# Patient Record
Sex: MALE | Race: BLACK OR AFRICAN AMERICAN | NOT HISPANIC OR LATINO | Employment: PART TIME | URBAN - METROPOLITAN AREA
[De-identification: names, ages, dates, MRNs, and addresses within clinical notes are randomized per-mention and may not be internally consistent; named-entity substitution may affect disease eponyms.]

---

## 2018-02-28 ENCOUNTER — HOSPITAL ENCOUNTER (EMERGENCY)
Facility: HOSPITAL | Age: 38
Discharge: LEFT BEFORE DISCHARGE | End: 2018-02-28
Attending: EMERGENCY MEDICINE
Payer: COMMERCIAL

## 2018-02-28 VITALS
HEART RATE: 68 BPM | SYSTOLIC BLOOD PRESSURE: 142 MMHG | OXYGEN SATURATION: 98 % | RESPIRATION RATE: 18 BRPM | DIASTOLIC BLOOD PRESSURE: 86 MMHG | TEMPERATURE: 98.2 F

## 2018-02-28 DIAGNOSIS — R73.9 HYPERGLYCEMIA: Primary | ICD-10-CM

## 2018-02-28 DIAGNOSIS — N17.9 ACUTE KIDNEY INJURY (HCC): ICD-10-CM

## 2018-02-28 LAB
ANION GAP SERPL CALCULATED.3IONS-SCNC: 11 MMOL/L (ref 4–13)
BACTERIA UR QL AUTO: ABNORMAL /HPF
BILIRUB UR QL STRIP: NEGATIVE
BUN SERPL-MCNC: 28 MG/DL (ref 5–25)
CALCIUM SERPL-MCNC: 9.7 MG/DL (ref 8.3–10.1)
CHLORIDE SERPL-SCNC: 83 MMOL/L (ref 100–108)
CLARITY UR: CLEAR
CO2 SERPL-SCNC: 28 MMOL/L (ref 21–32)
COLOR UR: YELLOW
CREAT SERPL-MCNC: 1.99 MG/DL (ref 0.6–1.3)
GFR SERPL CREATININE-BSD FRML MDRD: 48 ML/MIN/1.73SQ M
GLUCOSE SERPL-MCNC: 946 MG/DL (ref 65–140)
GLUCOSE SERPL-MCNC: >500 MG/DL (ref 65–140)
GLUCOSE SERPL-MCNC: >500 MG/DL (ref 65–140)
GLUCOSE UR STRIP-MCNC: ABNORMAL MG/DL
HGB UR QL STRIP.AUTO: ABNORMAL
KETONES UR STRIP-MCNC: NEGATIVE MG/DL
LEUKOCYTE ESTERASE UR QL STRIP: NEGATIVE
NITRITE UR QL STRIP: NEGATIVE
NON-SQ EPI CELLS URNS QL MICRO: ABNORMAL /HPF
PH UR STRIP.AUTO: 5.5 [PH] (ref 4.5–8)
POTASSIUM SERPL-SCNC: 4.7 MMOL/L (ref 3.5–5.3)
PROT UR STRIP-MCNC: NEGATIVE MG/DL
RBC #/AREA URNS AUTO: ABNORMAL /HPF
SODIUM SERPL-SCNC: 122 MMOL/L (ref 136–145)
SP GR UR STRIP.AUTO: 1.01 (ref 1–1.03)
UROBILINOGEN UR QL STRIP.AUTO: 0.2 E.U./DL
WBC #/AREA URNS AUTO: ABNORMAL /HPF

## 2018-02-28 PROCEDURE — 96360 HYDRATION IV INFUSION INIT: CPT

## 2018-02-28 PROCEDURE — 96361 HYDRATE IV INFUSION ADD-ON: CPT

## 2018-02-28 PROCEDURE — 36415 COLL VENOUS BLD VENIPUNCTURE: CPT | Performed by: EMERGENCY MEDICINE

## 2018-02-28 PROCEDURE — 81001 URINALYSIS AUTO W/SCOPE: CPT

## 2018-02-28 PROCEDURE — 80048 BASIC METABOLIC PNL TOTAL CA: CPT | Performed by: EMERGENCY MEDICINE

## 2018-02-28 PROCEDURE — 81002 URINALYSIS NONAUTO W/O SCOPE: CPT | Performed by: EMERGENCY MEDICINE

## 2018-02-28 PROCEDURE — 99283 EMERGENCY DEPT VISIT LOW MDM: CPT

## 2018-02-28 PROCEDURE — 82948 REAGENT STRIP/BLOOD GLUCOSE: CPT

## 2018-02-28 RX ADMIN — SODIUM CHLORIDE 1000 ML: 0.9 INJECTION, SOLUTION INTRAVENOUS at 10:39

## 2018-02-28 RX ADMIN — SODIUM CHLORIDE 1000 ML: 0.9 INJECTION, SOLUTION INTRAVENOUS at 11:52

## 2018-02-28 NOTE — ED NOTES
Pt states he cannot be admitted to the hospital today because he doesn't have anyone to stay with his kids, advised by Dr Elayne Bal that he will have to sign out [address], RN  02/28/18 3744

## 2018-02-28 NOTE — ED NOTES
Urine sample again requested from pt, states he cannot go at this time    Provided with cup of ice water "i'm thirsty as hell" advised to keep right arm straight so IV fluids can continue to infuse and notify staff when he is able to provide urine sample     Lety Ware RN  02/28/18 1111

## 2018-02-28 NOTE — ED NOTES
Pt stated "i should have gotten something to eat before coming in"  When asked when was last time pt had something to eat pt stated he ate a pickle this morning  Pt also stated that he needed to leave by 330pm because he needed to  his kids from school        Stone Little RN  02/28/18 1048

## 2018-02-28 NOTE — ED PROVIDER NOTES
History  Chief Complaint   Patient presents with    Urinary Frequency     pt reports urinary frequency and increased thirst since being released from shelter  pt also c/o numbness in hand  pt reports taking HCTZ daily  Having polyuria and polydipsia  No h/o DM  History provided by:  Patient   used: No    Urinary Frequency   Location:  Bladder  Onset quality:  Gradual  Duration:  3 days  Timing:  Constant  Progression:  Unchanged  Chronicity:  New  Associated symptoms: no abdominal pain, no cough, no diarrhea, no fever, no nausea, no rash, no rhinorrhea, no sore throat and no vomiting        None       History reviewed  No pertinent past medical history  History reviewed  No pertinent surgical history  History reviewed  No pertinent family history  I have reviewed and agree with the history as documented  Social History   Substance Use Topics    Smoking status: Current Some Day Smoker    Smokeless tobacco: Never Used    Alcohol use Yes      Comment: occasional        Review of Systems   Constitutional: Negative for chills and fever  HENT: Negative for rhinorrhea and sore throat  Respiratory: Negative for cough  Gastrointestinal: Negative for abdominal distention, abdominal pain, anal bleeding, blood in stool, constipation, diarrhea, nausea, rectal pain and vomiting  Endocrine: Positive for polydipsia and polyuria  Genitourinary: Negative for dysuria, flank pain, frequency, hematuria and urgency  Musculoskeletal: Negative for back pain  Skin: Negative for pallor and rash  All other systems reviewed and are negative        Physical Exam  ED Triage Vitals [02/28/18 1022]   Temperature Pulse Respirations Blood Pressure SpO2   97 6 °F (36 4 °C) 96 16 143/70 98 %      Temp Source Heart Rate Source Patient Position - Orthostatic VS BP Location FiO2 (%)   Temporal Monitor Sitting Right arm --      Pain Score       No Pain           Orthostatic Vital Signs  Vitals:    02/28/18 1022 02/28/18 1111 02/28/18 1336   BP: 143/70 116/57 142/86   Pulse: 96 92 68   Patient Position - Orthostatic VS: Sitting Lying Lying       Physical Exam   Constitutional: He is oriented to person, place, and time  He appears well-developed and well-nourished  No distress  HENT:   Head: Normocephalic  Mouth/Throat: Oropharynx is clear and moist and mucous membranes are normal    Neck: Normal range of motion  Neck supple  Cardiovascular: Normal rate, regular rhythm, normal heart sounds and intact distal pulses  Exam reveals no gallop and no friction rub  No murmur heard  Pulmonary/Chest: Effort normal and breath sounds normal  No respiratory distress  He has no wheezes  He has no rales  Abdominal: Soft  Normal appearance and bowel sounds are normal  He exhibits no distension and no mass  There is no hepatosplenomegaly  There is no tenderness  There is no rigidity, no rebound, no guarding, no CVA tenderness, no tenderness at McBurney's point and negative Johnson's sign  Lymphadenopathy:     He has no cervical adenopathy  Neurological: He is alert and oriented to person, place, and time  Skin: Skin is warm, dry and intact  No rash noted  No pallor  Nursing note and vitals reviewed        ED Medications  Medications   sodium chloride 0 9 % bolus 1,000 mL (0 mL Intravenous Stopped 2/28/18 1152)   sodium chloride 0 9 % bolus 1,000 mL (0 mL Intravenous Stopped 2/28/18 1303)       Diagnostic Studies  Results Reviewed     Procedure Component Value Units Date/Time    Fingerstick Glucose (POCT) [[id number]]  (Abnormal) Collected:  02/28/18 1305    Lab Status:  Final result Updated:  02/28/18 1306     POC Glucose >500 (HH) mg/dl     Urine Microscopic [[id number]]  (Abnormal) Collected:  02/28/18 1125    Lab Status:  Final result Specimen:  Urine from Urine, Clean Catch Updated:  02/28/18 1149     RBC, UA 0-1 (A) /hpf      WBC, UA None Seen /hpf      Epithelial Cells Occasional /hpf Bacteria, UA None Seen /hpf     Basic metabolic panel [[id number]]  (Abnormal) Collected:  02/28/18 1038    Lab Status:  Final result Specimen:  Blood from Arm, Right Updated:  02/28/18 1137     Sodium 122 (L) mmol/L      Potassium 4 7 mmol/L      Chloride 83 (L) mmol/L      CO2 28 mmol/L      Anion Gap 11 mmol/L      BUN 28 (H) mg/dL      Creatinine 1 99 (H) mg/dL      Glucose 946 (HH) mg/dL      Calcium 9 7 mg/dL      eGFR 48 ml/min/1 73sq m     Narrative:         National Kidney Disease Education Program recommendations are as follows:  GFR calculation is accurate only with a steady state creatinine  Chronic Kidney disease less than 60 ml/min/1 73 sq  meters  Kidney failure less than 15 ml/min/1 73 sq  meters  POCT urinalysis dipstick [[id number]]  (Abnormal) Resulted:  02/28/18 1126    Lab Status:  Final result Updated:  02/28/18 1126    ED Urine Macroscopic [[id number]]  (Abnormal) Collected:  02/28/18 1125    Lab Status:  Final result Specimen:  Urine Updated:  02/28/18 1124     Color, UA Yellow     Clarity, UA Clear     pH, UA 5 5     Leukocytes, UA Negative     Nitrite, UA Negative     Protein, UA Negative mg/dl      Glucose,  (1/2%) (A) mg/dl      Ketones, UA Negative mg/dl      Urobilinogen, UA 0 2 E U /dl      Bilirubin, UA Negative     Blood, UA Trace (A)     Specific [place], UA 1 010    Narrative:       CLINITEK RESULT    Fingerstick Glucose (POCT) [[id number]]  (Abnormal) Collected:  02/28/18 1028    Lab Status:  Final result Updated:  02/28/18 1034     POC Glucose >500 (HH) mg/dl                  No orders to display              Procedures  Procedures       Phone Contacts  ED Phone Contact    ED Course  ED Course as of Feb 28 1424 Wed Feb 28, 2018   1152 Discussed results with pt  I recommended pt stay for hyperglycemia and BLAKE  Pt states he can't stay because he has to  his kids  Pt understands the risk  He has an appt with his PCP in 2 days  36 Pt still refusing admission  Discussed risks  Signed AMA form  Pt had appt with PCP in 2 days  MDM  Number of Diagnoses or Management Options  Acute kidney injury [facility]): Hyperglycemia:   Diagnosis management comments: Polydipsia/polyuria with accucheck >500 - Will check BMP to r/o DKA/lyte abnormalities/BLAKE, urine to r/o ketones  Amount and/or Complexity of Data Reviewed  Clinical lab tests: ordered and reviewed      CritCare Time    Disposition  Final diagnoses:   Hyperglycemia   Acute kidney injury ([address] )     Time reflects when diagnosis was documented in both MDM as applicable and the Disposition within this note     Time User Action Codes Description Comment    2/28/2018 12:16 PM Danii Mcgee [R73 9] Hyperglycemia     2/28/2018 12:17 PM Edwina Canales [N17 9] Acute kidney injury [facility])       ED Disposition     ED Disposition Condition Comment    AMA  Date: 2/28/2018  Patient: Hubert Wild  Admitted: 2/28/2018 10:23 AM  Attending Provider: Isis Stern DO    Hubert Wild or his authorized caregiver has made the decision for the patient to leave the emergency department against the advice of  the emergency department staff  He or his authorized caregiver has been informed and understands the inherent risks, including death, worsening kidney failure, worsening blood sugars, electrolyte abnormalities, permanent disability, death  He or his aut horized caregiver has decided to accept the responsibility for this decision  Hubert Wild and all necessary parties have been advised that he may return for further evaluation or treatment  His condition at time of discharge was Stable    Paul burnett had current vital signs as follows:  /57 (BP Location: Left arm)   Pulse 92   Temp 97 6 °F (36 4 °C) (Temporal)   Resp 16         Follow-up Information     Follow up With Specialties Details Why Contact Info    Maria Guadalupe Chang MD Internal Medicine Schedule an appointment as soon as possible for a visit  [phone] [phone]  [phone]          There are no discharge medications for this patient  No discharge procedures on file      ED Provider  Electronically Signed by           Flor Siddiqui 24, DO 02/28/18 1426

## 2018-02-28 NOTE — DISCHARGE INSTRUCTIONS
Hyperglycemia, Non-Diabetic   WHAT YOU NEED TO KNOW:   Hyperglycemia is a blood glucose (sugar) level that is higher than normal  Hyperglycemia can be short-term, or it can become a long-term condition that leads to diabetes  DISCHARGE INSTRUCTIONS:   Medicines: You may  need any of the following:  · Hypoglycemic medicine  helps to decrease the amount of sugar in your blood  This medicine helps your body move the sugar to your cells, where it is needed for energy  Your healthcare provider will tell you how often to take this medicine and how long to take it  · Insulin  helps to decrease blood sugar levels  You may need 1 or more shots of insulin each day  You or a family member will be taught how to give the insulin shots  Your healthcare provider will tell you how often you need to inject insulin each day  He will also tell you how long you will need to take it  · Take your medicine as directed  Contact your healthcare provider if you think your medicine is not helping or if you have side effects  Tell him of her if you are allergic to any medicine  Keep a list of the medicines, vitamins, and herbs you take  Include the amounts, and when and why you take them  Bring the list or the pill bottles to follow-up visits  Carry your medicine list with you in case of an emergency  Follow up with your healthcare provider as directed: You may need to return for more tests  Your healthcare provider may also need to refer you to a specialist, such as a dietitian  Write down your questions so you remember to ask them during your visits  Manage hyperglycemia:  The following may help keep your blood sugar at the level recommended by your healthcare provider:  · Get regular exercise  This can help to lower your blood sugar levels  It can also improve your heart health and help you stay at a healthy weight  Get at least 30 minutes of exercise 5 days each week   Ask your healthcare provider about the best exercise plan for you  · Lose weight if you are overweight  Even a small loss of 5% to 10% of your body weight can help to decrease your blood sugar levels  It can also improve your heart health  · Eat healthy foods  Include foods that are high in fiber, such as vegetables, fruits, whole grains, and beans  Also include foods that are low in fat, such as low-fat dairy (milk, yogurt, and cheese), fish, and lean meat  Limit foods that are high in calories and sugar, such as sweet desserts, potato chips, and candy  Limit foods that are high in sodium, such as table salt and salty foods  Your healthcare provider may suggest that you limit carbohydrates to lower your blood sugar levels  · Do not smoke  If you smoke, it is never too late to quit  Smoking can worsen the problems that can occur with hyperglycemia  Ask your healthcare provider for information if you need help quitting  · Limit alcohol  Women should limit alcohol to 1 drink a day  Men should limit alcohol to 2 drinks a day  A drink of alcohol is 12 ounces of beer, 5 ounces of wine, or 1½ ounces of liquor  How to check your blood sugar level: You may need to check your blood sugar level with a blood glucose meter  If you take insulin, you may need to check your blood sugar level at least 3 times each day  Ask your healthcare provider when and how often to check during the day  Ask what your blood sugar levels should be before and after you eat  You may need to check for ketones in your urine or blood if your blood sugar level is high  Write down your results and show them to your healthcare provider  Contact your healthcare provider if:   · You have a fever  · Your blood sugar levels continue to be higher than you were told they should be  · You continue to urinate more often than usual      · You continue to be more thirsty than usual      · You continue to have nausea and vomiting       · You have a wound that has signs of infection, such as redness, swelling, and pus  · You have questions or concerns about your condition or care  Return to the emergency department if:   · Your arm or leg feels warm, tender, and painful  It may look swollen and red  · You feel lightheaded, short of breath, and have chest pain  · You cough up blood  © 2017 [employer] Information is for End User's use only and may not be sold, redistributed or otherwise used for commercial purposes  All illustrations and images included in CareNotes® are the copyrighted property of [employer]  or [employer]  The above information is an  only  It is not intended as medical advice for individual conditions or treatments  Talk to your doctor, nurse or pharmacist before following any medical regimen to see if it is safe and effective for you  Acute Kidney Injury   AMBULATORY CARE:   Acute kidney injury (BLAKE) is also called acute kidney failure, or acute renal failure  BLAKE happens when your kidneys suddenly stop working correctly  Normally, the kidneys remove fluid, chemicals, and waste from your blood  These wastes are turned into urine by your kidneys  BLAKE usually happens over hours or days  When you have BLAKE, your kidneys do not remove the waste, chemicals, or extra fluid from your body  A normal amount of urine is not produced  BLAKE is usually temporary, but it may become a chronic kidney condition  Causes of BLAKE:   · Decreased blood flow to the kidney, such as from hypercalcemia (high blood calcium level) or severe heart disease     · A disease or condition that affects the kidneys, such as hypertension (high blood pressure) or diabetes     · A blockage in the kidney or ureter, such as a kidney or bladder stone, enlarged prostate, or tumor  Common symptoms include the following: You may not have any symptoms with early or mild BLAKE   As BLAKE progresses, you may have any of the following:  · Decrease in the amount of urine or no urination    · Swelling in your arms, legs, or feet     · Weakness, drowsiness, or no appetite    · Nausea, flank pain, muscle twitching or muscle cramps    · Itchy skin, or your, breath or body smells like urine    · Behavior changes, confusion, disorientation, or seizures  Call 911 if:   · You have sudden chest pain or trouble breathing  Seek care immediately if:   · Your symptoms get worse  Contact your healthcare provider if:   · Your symptoms return  · Your blood sugar or blood pressure level is not within the range your healthcare provider recommends  · You have questions or concerns about your condition or care  Treatment for BLAKE  depends upon the cause of your acute kidney injury and how severe it is  Usually, BLAKE will be monitored in the hospital  If you have mild BLAKE, you may be able to go home to recover  Your healthcare providers will treat the cause of your BLAKE  You may need IV fluids if your BLAKE was caused by little or no fluid in your body  You may need dialysis to remove waste and extra fluid from your body  Nutrition:  Your healthcare provider may tell you to eat food low in sodium (salt), potassium, phosphorus, or protein  A dietitian can help you plan your meals  Drink liquids as directed: Your healthcare provider may recommend that you drink a certain amount of liquids  This will help your kidneys work better and decrease your risk for dehydration  Ask how much liquid to drink each day and which liquids are best for you  What you can do to manage and prevent BLAKE:   · Monitor and manage other health conditions  such as diabetes, high blood pressure, or heart disease  These conditions increase your risk for acute kidney injury  Take your medicines for these conditions as directed  Also, monitor your blood sugar and blood pressure levels as directed   Contact your healthcare provider if your levels are not in the range he or she says it should be     · Talk to your healthcare provider before you take over-the-counter-medicine  NSAIDs, stomach medicine, or laxatives may harm your kidneys and increase your risk for acute kidney injury  If it is okay to take the medicine, follow the directions on the package  Do not take more than directed  · Tell healthcare providers you have had acute kidney injury  before you get contrast liquid for an x-ray or CT scan  Your healthcare provider may give you medicine to prevent kidney problems caused by the liquid  Follow up with your healthcare provider as directed:  Write down your questions so you remember to ask them during your visits  © 2017 [employer] Information is for End User's use only and may not be sold, redistributed or otherwise used for commercial purposes  All illustrations and images included in CareNotes® are the copyrighted property of [employer]  or [employer]  The above information is an  only  It is not intended as medical advice for individual conditions or treatments  Talk to your doctor, nurse or pharmacist before following any medical regimen to see if it is safe and effective for you

## 2018-03-02 ENCOUNTER — OFFICE VISIT (OUTPATIENT)
Dept: INTERNAL MEDICINE CLINIC | Facility: CLINIC | Age: 38
End: 2018-03-02
Payer: COMMERCIAL

## 2018-03-02 VITALS
HEIGHT: 70 IN | HEART RATE: 90 BPM | TEMPERATURE: 98.6 F | DIASTOLIC BLOOD PRESSURE: 82 MMHG | SYSTOLIC BLOOD PRESSURE: 102 MMHG | BODY MASS INDEX: 42.26 KG/M2 | WEIGHT: 295.2 LBS | OXYGEN SATURATION: 98 %

## 2018-03-02 DIAGNOSIS — I10 HYPERTENSION, UNSPECIFIED TYPE: ICD-10-CM

## 2018-03-02 DIAGNOSIS — K21.9 GASTROESOPHAGEAL REFLUX DISEASE, ESOPHAGITIS PRESENCE NOT SPECIFIED: ICD-10-CM

## 2018-03-02 DIAGNOSIS — E10.65 TYPE 1 DIABETES MELLITUS WITH HYPERGLYCEMIA (HCC): ICD-10-CM

## 2018-03-02 DIAGNOSIS — R73.9 HYPERGLYCEMIA: Primary | ICD-10-CM

## 2018-03-02 DIAGNOSIS — IMO0001 CLASS 3 OBESITY DUE TO EXCESS CALORIES WITHOUT SERIOUS COMORBIDITY WITH BODY MASS INDEX (BMI) OF 40.0 TO 44.9 IN ADULT: ICD-10-CM

## 2018-03-02 PROBLEM — J45.909 ASTHMA: Status: ACTIVE | Noted: 2018-03-02

## 2018-03-02 LAB
GLUCOSE SERPL-MCNC: 514 MG/DL
SL AMB POCT GLUCOSE BLD: 514

## 2018-03-02 PROCEDURE — 82962 GLUCOSE BLOOD TEST: CPT | Performed by: NURSE PRACTITIONER

## 2018-03-02 PROCEDURE — 99204 OFFICE O/P NEW MOD 45 MIN: CPT | Performed by: NURSE PRACTITIONER

## 2018-03-02 RX ORDER — HYDROCHLOROTHIAZIDE 25 MG/1
25 TABLET ORAL DAILY
COMMUNITY
End: 2018-03-12

## 2018-03-02 RX ORDER — LISINOPRIL 10 MG/1
10 TABLET ORAL DAILY
COMMUNITY
End: 2018-03-12

## 2018-03-02 NOTE — PATIENT INSTRUCTIONS
As discussed we will give you 30 units of insulin today in the office  Starting tomorrow night you will give yourself 40 units of Lantus before bed daily  I would like you to go for your lab work next week on Wednesday  Follow-up with me on Friday  Monitor for any signs of cold sweats, feeling clammy or lightheaded or dizzy as these are signs of low blood sugar  If you are feeling these symptoms check your blood sugar immediately  If her blood sugar is less than 80 you need to give herself sugar such as orange juice  Then recheck your blood sugar in 10-15 minutes  If the blood sugar still low you need to drink more sugar(orange juice, peanut butter crackers, etc)  Stop taking her lisinopril and hydrochlorothiazide until we see you back next week  You will be educated today on how to give herself the insulin as well as how to check your blood sugar  We have also provided you with a log to keep track of your blood sugars  I would like you to check them 3 times a day and record them and bring that log with you to your next appointment next week

## 2018-03-02 NOTE — PROGRESS NOTES
Assessment/Plan:     Diagnoses and all orders for this visit:    Case was reviewed with Dr Judith Schreiber  45 minutes was spent with the patient on this case  Including review of ER records,  Review of personal medical history,  And controlling acute issues during visit today    Hyperglycemia  -     POCT blood glucose  -     insulin lispro protamine-insulin lispro (HumaLOG 50-50) 100 units/mL; Exp 7/2018  [id number] -  30 units given subQ in the office  Hypertension, unspecified type  -     TSH, 3rd generation with T4 reflex; Future    Gastroesophageal reflux disease, esophagitis presence not specified    Type 1 diabetes mellitus with hyperglycemia (HCC)  -     CBC and differential; Future  -     Basic metabolic panel; Future  -     HEMOGLOBIN A1C W/ EAG ESTIMATION; Future        -      Patient was given sample of Lantus and instructed to take Lantus 40 units q h s  Until his follow-up next week  Educated at length on signs and symptoms of hypoglycemia and how to treat accordingly  He was educated on red flag symptoms to go directly to the ER if he is experiencing  He was educated at length on how to use his glucometer to check his blood sugar and he was given a sheet to keep track of his blood sugars 3 times a day on and bring to his next appointment  He was also educated on administration of his Lantus and states that he does have experience with this in the past as 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  He will go for his blood work next Wednesday and follow up with me next Friday  At that time we will review his blood sugar log as well as his labs  Class 3 obesity due to excess calories without serious comorbidity with body mass index (BMI) of 40 0 to 44 9 in adult [facility])  -     Lipid Panel with Direct LDL reflex; Future    Other orders  -     lisinopril (ZESTRIL) 10 mg tablet; Take 10 mg by mouth daily  -     hydrochlorothiazide (HYDRODIURIL) 25 mg tablet;  Take 25 mg by mouth daily Subjective:      Patient ID: Mason Garcia is a 40 y o  male  HPI    Hypertension  Patient is here for follow-up of elevated blood pressure  He is not exercising and is trying adherent to a low-salt diet  He does notchecks his blood pressure at home  Blood pressure is well controlled  Current cardiac symptoms: none  Patient denies chest pain, chest pressure/discomfort, dyspnea, exertional chest pressure/discomfort, irregular heart beat, near-syncope, palpitations and syncope  He is currently taking lisinopril 10mg daily and HCTZ 25mg daily  He is compliant with medication use  Diabetes   patient is here today to establish care with our office and also needs to follow-up from the emergency department  He was seen at [address]  [facility] on 02/28/2018 for urinary frequency and on the urine analysis it showed glucose  His point of care blood sugar was greater than 500  A BMP revealed glucose 946 as well as creatinine 1 99 and a GFR of 48  His sodium was 122  The patient was advised to stay in the ER for hyperglycemia and BLAKE but he stated that he could not because he needed to  his kids  The patient signed out AMA  Today his fasting point of care blood sugar is 514 and he continues to have polyuria and polydipsia  Discussed case with Dr Nakul Cerrato who feels that patient is likely type 1 diabetic  Patient does have diabetes but is unsure Type 1 or Type 2      Patient states that he was incarcerated for the last 5 years and recently got out in January  He states that he was started on his blood pressure medications [place] in halfway    He was not following with the PCP prior and there are no records for review today      The following portions of the patient's history were reviewed and updated as appropriate: allergies, current medications, past family history, past medical history, past social history, past surgical history and problem list     Review of Systems   Constitutional: Positive for diaphoresis  Negative for activity change, chills, fatigue, fever and unexpected weight change  Eyes: Negative for visual disturbance  Respiratory: Negative for cough, chest tightness, shortness of breath and wheezing  Cardiovascular: Negative for chest pain, palpitations and leg swelling  Gastrointestinal: Negative for diarrhea, nausea and vomiting  Endocrine: Positive for polydipsia and polyuria  Negative for polyphagia  Genitourinary: Positive for frequency  Negative for dysuria  Neurological: Positive for weakness (generalized)  Negative for dizziness, light-headedness and headaches  Past Medical History:   Diagnosis Date    Asthma     Diabetes mellitus (HCC)     GERD (gastroesophageal reflux disease)     Hypertension          Current Outpatient Prescriptions:     hydrochlorothiazide (HYDRODIURIL) 25 mg tablet, Take 25 mg by mouth daily, Disp: , Rfl:     lisinopril (ZESTRIL) 10 mg tablet, Take 10 mg by mouth daily, Disp: , Rfl:     Allergies   Allergen Reactions    Dust Mite Extract     Pollen Extract        Social History   Past Surgical History:   Procedure Laterality Date    NO PAST SURGERIES       Family History   Problem Relation Age of Onset    Cancer Mother     Asthma Mother     Hypertension Mother     Diabetes Mother        Objective:  /82 (BP Location: Left arm, Patient Position: Sitting, Cuff Size: Adult)   Pulse 90   Temp 98 6 °F (37 °C) (Oral)   Ht 5' 9 5" (1 765 m)   Wt 134 kg (295 lb 3 2 oz)   SpO2 98%   BMI 42 97 kg/m²      Physical Exam   Constitutional: He is oriented to person, place, and time  He appears well-developed and well-nourished  No distress  Morbid obesity   HENT:   Head: Normocephalic and atraumatic     Right Ear: Tympanic membrane and external ear normal    Left Ear: Tympanic membrane and external ear normal    Nose: Nose normal    Mouth/Throat: Oropharynx is clear and moist  No oropharyngeal exudate, posterior oropharyngeal edema or posterior oropharyngeal erythema  Eyes: Conjunctivae and EOM are normal  Pupils are equal, round, and reactive to light  Neck: Normal range of motion  Neck supple  Cardiovascular: Normal rate, regular rhythm and normal heart sounds  No murmur heard  Pulmonary/Chest: Effort normal and breath sounds normal  No respiratory distress  He has no decreased breath sounds  He has no wheezes  He has no rhonchi  Musculoskeletal: He exhibits no edema  Lymphadenopathy:     He has no cervical adenopathy  Neurological: He is alert and oriented to person, place, and time  Skin: Skin is warm and dry  He is not diaphoretic  Psychiatric: He has a normal mood and affect  His behavior is normal    Vitals reviewed

## 2018-03-02 NOTE — ASSESSMENT & PLAN NOTE
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

## 2018-03-08 ENCOUNTER — CLINICAL SUPPORT (OUTPATIENT)
Dept: INTERNAL MEDICINE CLINIC | Facility: CLINIC | Age: 38
End: 2018-03-08
Payer: COMMERCIAL

## 2018-03-08 ENCOUNTER — TRANSCRIBE ORDERS (OUTPATIENT)
Dept: INTERNAL MEDICINE CLINIC | Facility: CLINIC | Age: 38
End: 2018-03-08

## 2018-03-08 DIAGNOSIS — I10 HYPERTENSION, UNSPECIFIED TYPE: ICD-10-CM

## 2018-03-08 DIAGNOSIS — E10.65 TYPE 1 DIABETES MELLITUS WITH HYPERGLYCEMIA (HCC): Primary | ICD-10-CM

## 2018-03-08 DIAGNOSIS — IMO0001 CLASS 3 OBESITY DUE TO EXCESS CALORIES IN ADULT, UNSPECIFIED BMI, UNSPECIFIED WHETHER SERIOUS COMORBIDITY PRESENT: ICD-10-CM

## 2018-03-08 PROCEDURE — 36415 COLL VENOUS BLD VENIPUNCTURE: CPT

## 2018-03-09 ENCOUNTER — OFFICE VISIT (OUTPATIENT)
Dept: INTERNAL MEDICINE CLINIC | Facility: CLINIC | Age: 38
End: 2018-03-09
Payer: COMMERCIAL

## 2018-03-09 VITALS
WEIGHT: 295.4 LBS | HEART RATE: 86 BPM | BODY MASS INDEX: 42.29 KG/M2 | DIASTOLIC BLOOD PRESSURE: 80 MMHG | TEMPERATURE: 98.8 F | HEIGHT: 70 IN | SYSTOLIC BLOOD PRESSURE: 104 MMHG | OXYGEN SATURATION: 97 %

## 2018-03-09 DIAGNOSIS — IMO0001 CLASS 3 OBESITY DUE TO EXCESS CALORIES WITHOUT SERIOUS COMORBIDITY WITH BODY MASS INDEX (BMI) OF 40.0 TO 44.9 IN ADULT: ICD-10-CM

## 2018-03-09 DIAGNOSIS — E11.65 TYPE 2 DIABETES MELLITUS WITH HYPERGLYCEMIA, WITHOUT LONG-TERM CURRENT USE OF INSULIN (HCC): Primary | ICD-10-CM

## 2018-03-09 PROCEDURE — 99213 OFFICE O/P EST LOW 20 MIN: CPT | Performed by: NURSE PRACTITIONER

## 2018-03-09 RX ORDER — BLOOD-GLUCOSE METER
KIT MISCELLANEOUS
Qty: 1 EACH | Refills: 0 | Status: SHIPPED | OUTPATIENT
Start: 2018-03-09 | End: 2018-06-19 | Stop reason: ALTCHOICE

## 2018-03-09 RX ORDER — LANCETS 28 GAUGE
EACH MISCELLANEOUS
Qty: 100 EACH | Refills: 0 | Status: SHIPPED | OUTPATIENT
Start: 2018-03-09 | End: 2018-06-19 | Stop reason: ALTCHOICE

## 2018-03-09 NOTE — PATIENT INSTRUCTIONS
Increase the amount of units you are giving yourself to 60 units every night  Check blood sugar 3 times a day and record and log  Please make sure to bring the log to your follow-up appointment on Monday and at that time we will review your lab results as well as review changes to your blood sugar with the increase of insulin  again if you notice any worsening symptoms such as headache blurred vision, abdominal pain, vomiting, feeling lightheaded or dizzy you should report immediately to the emergency room or call 911  Continue to drink plenty of fluids, avoid carbohydrates and sugar       continued to not take your blood pressure medication as it is well controlled without medication at this time

## 2018-03-09 NOTE — PROGRESS NOTES
Assessment/Plan:     Diagnoses and all orders for this visit:    Type 2 diabetes mellitus with hyperglycemia, without long-term current use of insulin (HCC)  -     insulin glargine (BASAGLAR KWIKPEN) injection pen 100 units/mL; Inject 0 6 mL (60 Units total) under the skin daily at bedtime  -     glucose monitoring kit (FREESTYLE) monitoring kit; Test blood sugar 3 times daily  -     Lancets (FREESTYLE) lancets; Check blood sugar 3 times daily  -     Insulin Pen Needle 31G X 5 MM MISC; Use one needle daily  -     glucose blood (FREESTYLE LITE) test strip; Check blood sugar 3 times daily  -      Case reviewed with Dr Saji Ellis  As patient has not gone into DKA likely type 2 diabetes  Lab results are not back yet as patient went yesterday and they are in process  Per patient's report blood sugars have not been below 370 and therefore we will increase his Lantus to 60 units daily and have him continue checking his blood sugar 3 times a day and record in a log  He is to follow up with our office in 3 days on Monday at which time we will review his lab work and also his blood sugar log  At that time we will determine if it is appropriate to start him on short-acting insulin as well versus metformin  Patient was given strict instructions of warning signs that he should either call 911 or follow-up with the emergency room immediately if he experiences them  Advised to limit carbohydrates and avoid sugary foods and drinks  Stay hydrated with plenty of water  His blood pressure is well controlled off of medication and we will continue to have him hold his medication at this time  Class 3 obesity due to excess calories without serious comorbidity with body mass index (BMI) of 40 0 to 44 9 in adult [facility])        Subjective:      Patient ID: Fatmata Flower is a 40 y o  male  HPI  Patient is here for 1 week follow up of Type 2 Dm and BLAKE  Patient had labs done yesterday but results are pending at this time   He reports that he has been using his Lantus 40 units daily at bedtime and has been checking his blood sugars 3 times a day  He forgot to bring his log with him today but from what he remembers his fasting blood sugar is typically greater than 400  The lowest blood sugar reading he has gotten has been 370s  The highest recording that he has gotten has said "high" which likely means is was higher than the glucometer was able to read  Current symptoms include polydipsia and polyuria  He denies headache, blurred vision, nausea, vomiting or loss of consciousness  He states that he is drinking a lot of water  "I go through a case every 2 days"    He states he has been trying to watch his carbs and sugars, but "maxime slipped up a few times"    Blood pressure is well controlled off medications  Currently 104/80  The following portions of the patient's history were reviewed and updated as appropriate: allergies, current medications, past family history, past medical history, past social history, past surgical history and problem list     Review of Systems   Constitutional: Negative for activity change, chills, diaphoresis, fatigue, fever and unexpected weight change  Respiratory: Negative for cough, chest tightness, shortness of breath and wheezing  Cardiovascular: Negative for chest pain, palpitations and leg swelling  Gastrointestinal: Negative for abdominal pain, diarrhea, nausea and vomiting  Endocrine: Positive for polydipsia and polyuria  Negative for polyphagia  Genitourinary: Negative for dysuria  Neurological: Negative for dizziness, syncope, weakness, light-headedness and headaches           Past Medical History:   Diagnosis Date    Asthma     Diabetes mellitus ([facility] [address] )     GERD (gastroesophageal reflux disease)     Hypertension          Current Outpatient Prescriptions:     hydrochlorothiazide (HYDRODIURIL) 25 mg tablet, Take 25 mg by mouth daily, Disp: , Rfl:     insulin lispro protamine-insulin lispro (HumaLOG 50-50) 100 units/mL, Exp 7/2018 [id number], Disp: 10 mL, Rfl: 0    lisinopril (ZESTRIL) 10 mg tablet, Take 10 mg by mouth daily, Disp: , Rfl:     Allergies   Allergen Reactions    Dust Mite Extract     Pollen Extract        Social History   Past Surgical History:   Procedure Laterality Date    NO PAST SURGERIES       Family History   Problem Relation Age of Onset    Cancer Mother     Asthma Mother     Hypertension Mother     Diabetes Mother        Objective:  /80 (BP Location: Left arm, Patient Position: Sitting, Cuff Size: Large)   Pulse 86   Temp 98 8 °F (37 1 °C) (Oral)   Ht 5' 9 5" (1 765 m)   Wt 134 kg (295 lb 6 4 oz)   SpO2 97%   BMI 43 00 kg/m²      Physical Exam   Constitutional: He is oriented to person, place, and time  He appears well-developed and well-nourished  No distress  obese   HENT:   Head: Normocephalic and atraumatic  Right Ear: Tympanic membrane and external ear normal    Left Ear: Tympanic membrane and external ear normal    Nose: Nose normal    Mouth/Throat: Oropharynx is clear and moist  No oropharyngeal exudate, posterior oropharyngeal edema or posterior oropharyngeal erythema  Eyes: Conjunctivae and EOM are normal  Pupils are equal, round, and reactive to light  Neck: Normal range of motion  Neck supple  No thyromegaly present  Cardiovascular: Normal rate, regular rhythm and normal heart sounds  No murmur heard  Pulmonary/Chest: Effort normal and breath sounds normal  No respiratory distress  He has no decreased breath sounds  He has no wheezes  He has no rhonchi  Musculoskeletal: He exhibits no edema  Lymphadenopathy:     He has no cervical adenopathy  Neurological: He is alert and oriented to person, place, and time  Skin: Skin is warm and dry  He is not diaphoretic  Psychiatric: He has a normal mood and affect  His behavior is normal    Vitals reviewed

## 2018-03-12 ENCOUNTER — OFFICE VISIT (OUTPATIENT)
Dept: INTERNAL MEDICINE CLINIC | Age: 38
End: 2018-03-12
Payer: COMMERCIAL

## 2018-03-12 VITALS
DIASTOLIC BLOOD PRESSURE: 76 MMHG | HEIGHT: 69 IN | TEMPERATURE: 98.1 F | BODY MASS INDEX: 42.89 KG/M2 | WEIGHT: 289.6 LBS | OXYGEN SATURATION: 96 % | HEART RATE: 87 BPM | SYSTOLIC BLOOD PRESSURE: 118 MMHG

## 2018-03-12 DIAGNOSIS — N17.9 AKI (ACUTE KIDNEY INJURY) (HCC): ICD-10-CM

## 2018-03-12 DIAGNOSIS — E11.65 TYPE 2 DIABETES MELLITUS WITH HYPERGLYCEMIA, WITHOUT LONG-TERM CURRENT USE OF INSULIN (HCC): Primary | ICD-10-CM

## 2018-03-12 PROCEDURE — 99214 OFFICE O/P EST MOD 30 MIN: CPT | Performed by: NURSE PRACTITIONER

## 2018-03-12 NOTE — PROGRESS NOTES
Assessment/Plan:     Diagnoses and all orders for this visit:    Type 2 diabetes mellitus with hyperglycemia, without long-term current use of insulin ([facility])  -     metFORMIN (GLUCOPHAGE) 500 mg tablet; Take 1 tablet (500 mg total) by mouth 3 (three) times a day with meals  -     Basic metabolic panel; Future        -     Patient instructed to start metformin 500 mg once a day for the 1st week, then twice a day for the  2nd week, then full dose of 3 times a day for the 3rd week and going forward         -     Will have patient call office next week with  Fasting blood sugar results and determine if increase in  insulin is necessary    BLAKE (acute kidney injury) ([facility] [address] )  -     Basic metabolic panel; Future - recheck in 3 weeks        -     Significantly improved  Creatinine now 1 44 and GFR 64  Reviewed plan with Dr Jarett Marroquin  Reviewed all labs from 3/8/18 and compared to labs from ER on 2/28/18    Subjective:      Patient ID: Pascual Woo is a 40 y o  male  HPI    Diabetes II Uncontrolled  Patient is here today for 3 day follow up uncontrolled DM II  He has been checking his blood sugar at home 3 times a day  He does not have a log with him today again, but did bring his glucometer  3 days ago we increased him from 40U lantus qhs to 60U lantus qhs  His blood sugar has been slightly improving on the increased dose  He continues to complain of nocturia and polydipsia       Recordings:    Fasting 385, 280, 354    2 5 hours after lunch  385, 354    2 hours after dinner  376, 294        BLAKE  Significantly improving  Currently off of lisinopril and HCTZ, BP well controlled  Review of labs from 3/8/18 compared to labs from ER visit on 2/28/18  Non fasting glucose 464  BUN 18, previously 28  Creatinine 1 41, previously 1 99  Sodium 133, previously 122  Potassium 4 6  Chloride 99, previous 83  Carbon dioxide 26  Calcium 9 5  Anion Gap 8  GFR 63, previously 48    HbA1c 9 6    Tsh 1 00    The following portions of the patient's history were reviewed and updated as appropriate: allergies, current medications, past family history, past medical history, past social history, past surgical history and problem list     Review of Systems   Constitutional: Negative for activity change, chills, diaphoresis, fatigue and fever  Respiratory: Negative for cough, chest tightness, shortness of breath and wheezing  Cardiovascular: Negative for chest pain, palpitations and leg swelling  Gastrointestinal: Negative for abdominal pain, diarrhea, nausea and vomiting  Endocrine: Positive for polydipsia and polyuria  Negative for polyphagia  Genitourinary: Positive for frequency  Neurological: Negative for dizziness, syncope, light-headedness and headaches           Past Medical History:   Diagnosis Date    Asthma     Diabetes mellitus ([facility] [address] )     GERD (gastroesophageal reflux disease)     Hypertension          Current Outpatient Prescriptions:     glucose blood (FREESTYLE LITE) test strip, Check blood sugar 3 times daily, Disp: 100 each, Rfl: 0    glucose monitoring kit (FREESTYLE) monitoring kit, Test blood sugar 3 times daily, Disp: 1 each, Rfl: 0    insulin glargine (BASAGLAR KWIKPEN) injection pen 100 units/mL, Inject 0 6 mL (60 Units total) under the skin daily at bedtime, Disp: 6 pen, Rfl: 1    insulin lispro protamine-insulin lispro (HumaLOG 50-50) 100 units/mL, Exp 7/2018 [id number], Disp: 10 mL, Rfl: 0    Insulin Pen Needle 31G X 5 MM MISC, Use one needle daily, Disp: 90 each, Rfl: 0    Lancets (FREESTYLE) lancets, Check blood sugar 3 times daily, Disp: 100 each, Rfl: 0    hydrochlorothiazide (HYDRODIURIL) 25 mg tablet, Take 25 mg by mouth daily, Disp: , Rfl:     lisinopril (ZESTRIL) 10 mg tablet, Take 10 mg by mouth daily, Disp: , Rfl:     Allergies   Allergen Reactions    Dust Mite Extract Other (See Comments) and Nasal Congestion     Itchy eyes    Pollen Extract Other (See Comments) and Nasal Congestion     Itchy eyes Social History   Past Surgical History:   Procedure Laterality Date    NO PAST SURGERIES       Family History   Problem Relation Age of Onset    Cancer Mother     Asthma Mother     Diabetes Mother     Hypertension Mother     Diabetes Father     Hypertension Father        Objective:  /76 (BP Location: Left arm, Patient Position: Sitting, Cuff Size: Large)   Pulse 87   Temp 98 1 °F (36 7 °C) (Tympanic)   Ht 5' 9 49" (1 765 m)   Wt 131 kg (289 lb 9 6 oz)   SpO2 96%   BMI 42 17 kg/m²      Physical Exam   Constitutional: He is oriented to person, place, and time  He appears well-developed and well-nourished  No distress  Obese   HENT:   Head: Normocephalic and atraumatic  Right Ear: External ear normal    Left Ear: External ear normal    Nose: Nose normal    Mouth/Throat: Oropharynx is clear and moist  No oropharyngeal exudate, posterior oropharyngeal edema or posterior oropharyngeal erythema  Eyes: Conjunctivae are normal  Pupils are equal, round, and reactive to light  Neck: Normal range of motion  Neck supple  Cardiovascular: Normal rate, regular rhythm and normal heart sounds  No murmur heard  Pulmonary/Chest: Effort normal and breath sounds normal  No respiratory distress  He has no decreased breath sounds  He has no wheezes  He has no rhonchi  Musculoskeletal: He exhibits no edema  Neurological: He is alert and oriented to person, place, and time  Skin: Skin is warm and dry  He is not diaphoretic  Psychiatric: He has a normal mood and affect  His behavior is normal    Vitals reviewed

## 2018-03-12 NOTE — PATIENT INSTRUCTIONS
Reviewed labs today which are significantly improved from the emergency department  Your kidney function is not yet at baseline but it is much improved and okay to start metformin  The medication will be directed to take 3 times a day with meals, breakfast lunch and dinner 500 mg  For the 1st week only take 500 mg once a day with breakfast   In the 2nd week start taking 500 mg twice a day with breakfast and dinner  Week 3 you can start the full dosing of 500 mg 3 times a day with breakfast lunch and dinner  Continue off of your blood pressure medications as we have discontinued them today and her blood pressure has been controlled  I do advise that he continue to check her blood pressure daily at home and notify the office if greater than 140/90  Continue healthy diet low in carbohydrates and sugars  Continued exercise  We will recheck fasting blood work in 3 weeks  Continue to check blood sugars 3 times a day and record in log to bring with to next appointment

## 2018-04-02 ENCOUNTER — OFFICE VISIT (OUTPATIENT)
Dept: INTERNAL MEDICINE CLINIC | Age: 38
End: 2018-04-02
Payer: COMMERCIAL

## 2018-04-02 VITALS
DIASTOLIC BLOOD PRESSURE: 76 MMHG | SYSTOLIC BLOOD PRESSURE: 118 MMHG | HEART RATE: 86 BPM | OXYGEN SATURATION: 98 % | WEIGHT: 299.2 LBS | TEMPERATURE: 98.4 F | HEIGHT: 69 IN | BODY MASS INDEX: 44.31 KG/M2

## 2018-04-02 DIAGNOSIS — E11.65 TYPE 2 DIABETES MELLITUS WITH HYPERGLYCEMIA, WITHOUT LONG-TERM CURRENT USE OF INSULIN (HCC): ICD-10-CM

## 2018-04-02 DIAGNOSIS — E11.65 TYPE 2 DIABETES MELLITUS WITH HYPERGLYCEMIA, WITHOUT LONG-TERM CURRENT USE OF INSULIN (HCC): Primary | ICD-10-CM

## 2018-04-02 DIAGNOSIS — N17.9 AKI (ACUTE KIDNEY INJURY) (HCC): ICD-10-CM

## 2018-04-02 DIAGNOSIS — Z13.220 LIPID SCREENING: ICD-10-CM

## 2018-04-02 DIAGNOSIS — IMO0001 CLASS 3 OBESITY DUE TO EXCESS CALORIES WITHOUT SERIOUS COMORBIDITY WITH BODY MASS INDEX (BMI) OF 40.0 TO 44.9 IN ADULT: ICD-10-CM

## 2018-04-02 DIAGNOSIS — Z91.89 AT RISK FOR SEXUALLY TRANSMITTED DISEASE DUE TO UNPROTECTED SEX: ICD-10-CM

## 2018-04-02 PROCEDURE — 99214 OFFICE O/P EST MOD 30 MIN: CPT | Performed by: NURSE PRACTITIONER

## 2018-04-02 NOTE — PATIENT INSTRUCTIONS
Diabetes - Cut back insulin to 50 units before bed  Metformin 3 times a day  Breakfast, lunch and dinner  You may need a small snack before bed  Call office if blood sugars below 100  For symptoms of hypoglycemia or low blood sugar, eat sugary snack or peanut butter crackers or orange juice, recheck sugar in 15 minutes, if still <100 repeat until blood sugar >100  Go for lab work fasting       Follow up 1-2 weeks

## 2018-04-02 NOTE — PROGRESS NOTES
Diabetic Foot Exam    Patient's shoes and socks removed  Right Foot/Ankle   Right Foot Inspection  Skin Exam: skin normal, skin intact and dry skin (excessively dry) no warmth, no callus, no erythema, no maceration, no abnormal color, no pre-ulcer, no ulcer and no callus                          Toe Exam: ROM and strength within normal limits  Sensory     Proprioception: intact   Monofilament testing: intact  Vascular  Capillary refills: < 3 seconds  The right DP pulse is 2+  The right PT pulse is 2+  Left Foot/Ankle  Left Foot Inspection  Skin Exam: skin normal, skin intact and dry skin (excessively dry )no warmth, no erythema, no maceration, normal color, no pre-ulcer, no ulcer and no callus                         Toe Exam: ROM and strength within normal limits                   Sensory     Proprioception: intact  Monofilament: intact  Vascular  Capillary refills: < 3 seconds  The left DP pulse is 2+  The left PT pulse is 2+  Assign Risk Category:  No deformity present; No loss of protective sensation;  No weak pulses       Risk: 0

## 2018-04-02 NOTE — PROGRESS NOTES
Assessment/Plan:     Diagnoses and all orders for this visit:    Type 2 diabetes mellitus with hyperglycemia, without long-term current use of insulin ([facility])  -     Basic metabolic panel; Future  -     Microalbumin / creatinine urine ratio  -     insulin glargine (BASAGLAR KWIKPEN) injection pen 100 units/mL; Inject 0 5 mL (50 Units total) under the skin daily at bedtime         -     Reinforced with patient that metformin is only to be taken 3 times a day with breakfast lunch and dinner     Bedtime dose that he was taking by mistake  Decreased insulin from 50 units to 60 units before bed as patient was becoming hypoglycemic in the morning  Reviewed symptoms of hypoglycemia and how to treat  He is to notify office if blood sugars continue to be less than 100           -    Overall we are moving in the right direction with controlling his blood sugars but  Seem to be over treating at this point  BLAKE (acute kidney injury) ([facility] [address] )  -     Basic metabolic panel; Future  -     Microalbumin / creatinine urine ratio    At risk for sexually transmitted disease due to unprotected sex  -     HIV 1/2 AG-AB combo; Future  -     RPR; Future  -     Hepatitis C antibody; Future  -     Hepatitis panel, acute; Future  -     Chlamydia/GC amplified DNA by PCR; Future    Class 3 obesity due to excess calories without serious comorbidity with body mass index (BMI) of 40 0 to 44 9 in adult [facility])  -     Lipid Panel with Direct LDL reflex; Future    Lipid screening  -     Lipid Panel with Direct LDL reflex; Future           Patient to follow up in 1-2 weeks to review results  He will call sooner for any blood sugars less than 100  Goal will be to extend visits to further increments,  But 1st need to complete baseline lab work as well as gain control of blood sugars without hypoglycemic events  Subjective:      Patient ID: Lashawn Schuster is a 40 y o  male  HPI     Patient is here today for 3 week follow up uncontrolled DM Ii  At the last appointment his blood sugars continued to be significantly elevated >300  We kept him on the lantus 60 units qhs and added Metformin 500mg 3 times a day  He states that he has been taking the Metformin 500mg 4 times a day because he was unsure on how many times to be taking it  He has been checking his blood sugars at home 3 times a day  Fasting - 57 - 140s  He has not had any fasting blood sugars greater than 200    2 hours after lunch -  Low 100s - 140s  Bedtime - 130s-160s    He reports hypoglycemic events occurring almost daily  Symptoms upon waking include shakiness  He states he feels like a different person and know that he needs to eat  Symptoms resolve after eating a piece of candy or drinking some juice  No labs completed prior to visit  He was to get a BMP, but states he was not able to  Last eye exam - February  He goes to [facility]  He reports unprotected sex with a single female partner in February recently after getting out of senior care  He is requesting full panel of STD testing  The following portions of the patient's history were reviewed and updated as appropriate: allergies, current medications, past family history, past medical history, past social history, past surgical history and problem list     Review of Systems   Constitutional: Negative for activity change, appetite change, chills, diaphoresis, fatigue and fever  Eyes: Negative for visual disturbance  Respiratory: Positive for wheezing  Negative for cough, chest tightness and shortness of breath  Cardiovascular: Negative for chest pain, palpitations and leg swelling  Gastrointestinal: Negative for diarrhea, nausea and vomiting  Neurological: Negative for dizziness, syncope, light-headedness and headaches           Past Medical History:   Diagnosis Date    Asthma     Diabetes mellitus ([address] )     GERD (gastroesophageal reflux disease)     Hypertension          Current Outpatient Prescriptions:   glucose blood (FREESTYLE LITE) test strip, Check blood sugar 3 times daily, Disp: 100 each, Rfl: 0    glucose monitoring kit (FREESTYLE) monitoring kit, Test blood sugar 3 times daily, Disp: 1 each, Rfl: 0    insulin glargine (BASAGLAR KWIKPEN) injection pen 100 units/mL, Inject 0 6 mL (60 Units total) under the skin daily at bedtime, Disp: 6 pen, Rfl: 1    Insulin Pen Needle 31G X 5 MM MISC, Use one needle daily, Disp: 90 each, Rfl: 0    Lancets (FREESTYLE) lancets, Check blood sugar 3 times daily, Disp: 100 each, Rfl: 0    metFORMIN (GLUCOPHAGE) 500 mg tablet, Take 1 tablet (500 mg total) by mouth 3 (three) times a day with meals, Disp: 90 tablet, Rfl: 1    Allergies   Allergen Reactions    Dust Mite Extract Other (See Comments) and Nasal Congestion     Itchy eyes    Pollen Extract Other (See Comments) and Nasal Congestion     Itchy eyes       Social History   Past Surgical History:   Procedure Laterality Date    NO PAST SURGERIES       Family History   Problem Relation Age of Onset    Cancer Mother     Asthma Mother     Diabetes Mother     Hypertension Mother     Diabetes Father     Hypertension Father        Objective:  /76 (BP Location: Left arm, Patient Position: Sitting, Cuff Size: Large)   Pulse 86   Temp 98 4 °F (36 9 °C) (Tympanic)   Ht 5' 9 49" (1 765 m)   Wt 136 kg (299 lb 3 2 oz)   SpO2 98%   BMI 43 56 kg/m²      Physical Exam   Constitutional: He is oriented to person, place, and time  He appears well-developed and well-nourished  No distress  Obese   HENT:   Head: Normocephalic and atraumatic  Right Ear: Tympanic membrane and external ear normal    Left Ear: Tympanic membrane and external ear normal    Nose: Nose normal    Mouth/Throat: Oropharynx is clear and moist  No oropharyngeal exudate, posterior oropharyngeal edema or posterior oropharyngeal erythema  Eyes: Conjunctivae and EOM are normal  Pupils are equal, round, and reactive to light     Neck: Normal range of motion  Neck supple  No thyromegaly present  Cardiovascular: Normal rate, regular rhythm and normal heart sounds  No murmur heard  Pulmonary/Chest: Effort normal and breath sounds normal  No respiratory distress  He has no decreased breath sounds  He has no wheezes  He has no rhonchi  Musculoskeletal: He exhibits no edema  Lymphadenopathy:     He has no cervical adenopathy  Neurological: He is alert and oriented to person, place, and time  Skin: Skin is warm and dry  He is not diaphoretic  Psychiatric: He has a normal mood and affect  His behavior is normal    Vitals reviewed

## 2018-04-04 ENCOUNTER — APPOINTMENT (OUTPATIENT)
Dept: LAB | Age: 38
End: 2018-04-04
Payer: COMMERCIAL

## 2018-04-04 DIAGNOSIS — IMO0001 CLASS 3 OBESITY DUE TO EXCESS CALORIES WITHOUT SERIOUS COMORBIDITY WITH BODY MASS INDEX (BMI) OF 40.0 TO 44.9 IN ADULT: ICD-10-CM

## 2018-04-04 DIAGNOSIS — N17.9 AKI (ACUTE KIDNEY INJURY) (HCC): ICD-10-CM

## 2018-04-04 DIAGNOSIS — Z13.220 LIPID SCREENING: ICD-10-CM

## 2018-04-04 DIAGNOSIS — E11.65 TYPE 2 DIABETES MELLITUS WITH HYPERGLYCEMIA, WITHOUT LONG-TERM CURRENT USE OF INSULIN (HCC): ICD-10-CM

## 2018-04-04 DIAGNOSIS — Z91.89 AT RISK FOR SEXUALLY TRANSMITTED DISEASE DUE TO UNPROTECTED SEX: ICD-10-CM

## 2018-04-04 LAB
ANION GAP SERPL CALCULATED.3IONS-SCNC: 2 MMOL/L (ref 4–13)
BUN SERPL-MCNC: 17 MG/DL (ref 5–25)
CALCIUM SERPL-MCNC: 8.3 MG/DL (ref 8.3–10.1)
CHLAMYDIA DNA CVX QL NAA+PROBE: NORMAL
CHLORIDE SERPL-SCNC: 108 MMOL/L (ref 100–108)
CHOLEST SERPL-MCNC: 157 MG/DL (ref 50–200)
CO2 SERPL-SCNC: 31 MMOL/L (ref 21–32)
CREAT SERPL-MCNC: 1.13 MG/DL (ref 0.6–1.3)
CREAT UR-MCNC: 139 MG/DL
GFR SERPL CREATININE-BSD FRML MDRD: 95 ML/MIN/1.73SQ M
GLUCOSE P FAST SERPL-MCNC: 88 MG/DL (ref 65–99)
HAV IGM SER QL: NORMAL
HBV CORE IGM SER QL: NORMAL
HBV SURFACE AG SER QL: NORMAL
HCV AB SER QL: NORMAL
HDLC SERPL-MCNC: 28 MG/DL (ref 40–60)
LDLC SERPL CALC-MCNC: 99 MG/DL (ref 0–100)
MICROALBUMIN UR-MCNC: 8.1 MG/L (ref 0–20)
MICROALBUMIN/CREAT 24H UR: 6 MG/G CREATININE (ref 0–30)
N GONORRHOEA DNA GENITAL QL NAA+PROBE: NORMAL
POTASSIUM SERPL-SCNC: 3.7 MMOL/L (ref 3.5–5.3)
SODIUM SERPL-SCNC: 141 MMOL/L (ref 136–145)
TRIGL SERPL-MCNC: 149 MG/DL

## 2018-04-04 PROCEDURE — 80061 LIPID PANEL: CPT

## 2018-04-04 PROCEDURE — 80074 ACUTE HEPATITIS PANEL: CPT

## 2018-04-04 PROCEDURE — 86592 SYPHILIS TEST NON-TREP QUAL: CPT

## 2018-04-04 PROCEDURE — 87389 HIV-1 AG W/HIV-1&-2 AB AG IA: CPT

## 2018-04-04 PROCEDURE — 80048 BASIC METABOLIC PNL TOTAL CA: CPT

## 2018-04-04 PROCEDURE — 87591 N.GONORRHOEAE DNA AMP PROB: CPT

## 2018-04-04 PROCEDURE — 87491 CHLMYD TRACH DNA AMP PROBE: CPT

## 2018-04-04 PROCEDURE — 82043 UR ALBUMIN QUANTITATIVE: CPT | Performed by: NURSE PRACTITIONER

## 2018-04-04 PROCEDURE — 3061F NEG MICROALBUMINURIA REV: CPT | Performed by: NURSE PRACTITIONER

## 2018-04-04 PROCEDURE — 82570 ASSAY OF URINE CREATININE: CPT | Performed by: NURSE PRACTITIONER

## 2018-04-04 PROCEDURE — 36415 COLL VENOUS BLD VENIPUNCTURE: CPT

## 2018-04-05 LAB — RPR SER QL: NORMAL

## 2018-04-06 LAB — HIV 1+2 AB+HIV1 P24 AG SERPL QL IA: NORMAL

## 2018-04-16 ENCOUNTER — OFFICE VISIT (OUTPATIENT)
Dept: INTERNAL MEDICINE CLINIC | Age: 38
End: 2018-04-16
Payer: COMMERCIAL

## 2018-04-16 VITALS
OXYGEN SATURATION: 98 % | TEMPERATURE: 96.6 F | WEIGHT: 306.6 LBS | SYSTOLIC BLOOD PRESSURE: 148 MMHG | BODY MASS INDEX: 45.41 KG/M2 | HEART RATE: 87 BPM | HEIGHT: 69 IN | DIASTOLIC BLOOD PRESSURE: 92 MMHG

## 2018-04-16 DIAGNOSIS — E11.65 TYPE 2 DIABETES MELLITUS WITH HYPERGLYCEMIA, WITHOUT LONG-TERM CURRENT USE OF INSULIN (HCC): ICD-10-CM

## 2018-04-16 PROCEDURE — 99213 OFFICE O/P EST LOW 20 MIN: CPT | Performed by: NURSE PRACTITIONER

## 2018-04-16 NOTE — PATIENT INSTRUCTIONS
Decrease insulin to 40 units before bed  Continue metformin 500mg three times a day with meals  Continue to check blood sugars 3 times a day and record in log  Call office if you are getting any blood sugars  Less than 85 or greater than 200  Otherwise follow up in 1 month   Bring log with BS recording in to the next visit!!!

## 2018-04-16 NOTE — PROGRESS NOTES
Assessment/Plan:     Diagnoses and all orders for this visit:    Type 2 diabetes mellitus with hyperglycemia, without long-term current use of insulin ([facility])  -     metFORMIN (GLUCOPHAGE) 500 mg tablet; Take 1 tablet (500 mg total) by mouth 3 (three) times a day with meals  -     glucose blood (FREESTYLE LITE) test strip; Check blood sugar 3 times daily  -     insulin glargine (BASAGLAR KWIKPEN) injection pen 100 units/mL; Inject 40 Units of insulin subcutaneously at bedtime  The patient continues to have low fasting blood sugars readings with the lowest being in the 60s  Other readings are between 70s and 80s fasting but still continuing to treat beyond goal   Will decrease his long-acting insulin from 50 units to 40 units q h s  He will continue on metformin 3 times a day  He was given a log in the office today to record his blood sugars in and he is to bring this with his next appointment  He will follow up in 1 month  He was given instruction to call the office if his blood sugars are less than 85 or greater than 200  Subjective:       Patient ID: Nikole Gallo is a 40 y o  male  HPI    Patient is here today for follow up Dm2 and to review labs  Labs checks for STI testing - full panel negative    Diabetes Mellitus  Patient presents for follow up of diabetes  Type 2  Current symptoms include: hypoglycemia 1 episode since last visit    Symptoms have gradually improved  Patient denies hyperglycemia, increased appetite, nausea, paresthesia of the feet, polydipsia, polyuria, visual disturbances, vomiting and weight loss  Evaluation to date has included: fasting blood sugar, fasting lipid panel, hemoglobin A1C and microalbuminuria  Home sugars: checking BS 3 times a day  Fasting blood sugars are 80s  Lowest being 60s  Lunch times are in 80s-90s  Bedtime sugars are usually 110-120s    Current treatment: Metformin 500mg with meals tid, and insulin 50 Units before bed          The following portions of the patient's history were reviewed and updated as appropriate: allergies, current medications, past family history, past medical history, past social history, past surgical history and problem list     Review of Systems   Constitutional: Negative for activity change, appetite change, chills, diaphoresis, fatigue, fever and unexpected weight change  Respiratory: Negative for cough, chest tightness, shortness of breath and wheezing  Cardiovascular: Negative for chest pain, palpitations and leg swelling  Endocrine: Negative for polydipsia, polyphagia and polyuria           Past Medical History:   Diagnosis Date    Asthma     Diabetes mellitus ([facility] [address] )     GERD (gastroesophageal reflux disease)     Hypertension          Current Outpatient Prescriptions:     glucose blood (FREESTYLE LITE) test strip, Check blood sugar 3 times daily, Disp: 100 each, Rfl: 0    glucose monitoring kit (FREESTYLE) monitoring kit, Test blood sugar 3 times daily, Disp: 1 each, Rfl: 0    insulin glargine (BASAGLAR KWIKPEN) injection pen 100 units/mL, Inject 0 5 mL (50 Units total) under the skin daily at bedtime, Disp: 6 pen, Rfl: 5    Insulin Pen Needle 31G X 5 MM MISC, Use one needle daily, Disp: 90 each, Rfl: 0    Lancets (FREESTYLE) lancets, Check blood sugar 3 times daily, Disp: 100 each, Rfl: 0    metFORMIN (GLUCOPHAGE) 500 mg tablet, Take 1 tablet (500 mg total) by mouth 3 (three) times a day with meals, Disp: 90 tablet, Rfl: 1    Allergies   Allergen Reactions    Dust Mite Extract Other (See Comments) and Nasal Congestion     Itchy eyes    Pollen Extract Other (See Comments) and Nasal Congestion     Itchy eyes       Social History   Past Surgical History:   Procedure Laterality Date    NO PAST SURGERIES       Family History   Problem Relation Age of Onset    Cancer Mother     Asthma Mother     Diabetes Mother     Hypertension Mother     Diabetes Father     Hypertension Father        Objective:  BP 148/92 (BP Location: Left arm, Patient Position: Sitting, Cuff Size: Standard)   Pulse 87   Temp (!) 96 6 °F (35 9 °C) (Tympanic)   Ht 5' 9 49" (1 765 m)   Wt (!) 139 kg (306 lb 9 6 oz)   SpO2 98%   BMI 44 64 kg/m²      Physical Exam   Constitutional: He is oriented to person, place, and time  He appears well-developed and well-nourished  No distress  Obese   HENT:   Head: Normocephalic and atraumatic  Mouth/Throat: Oropharynx is clear and moist    Eyes: Conjunctivae and EOM are normal  Pupils are equal, round, and reactive to light  Neck: Neck supple  No thyromegaly present  Cardiovascular: Normal rate, regular rhythm and normal heart sounds  No murmur heard  Pulmonary/Chest: Effort normal and breath sounds normal  No respiratory distress  He has no wheezes  Musculoskeletal: He exhibits no edema  Lymphadenopathy:     He has no cervical adenopathy  Neurological: He is alert and oriented to person, place, and time  Skin: Skin is warm and dry  He is not diaphoretic  Psychiatric: He has a normal mood and affect   His behavior is normal

## 2018-05-11 ENCOUNTER — TELEPHONE (OUTPATIENT)
Dept: INTERNAL MEDICINE CLINIC | Age: 38
End: 2018-05-11

## 2018-05-11 NOTE — TELEPHONE ENCOUNTER
Patient is requesting something for severe heartburn that wakes him up at night and also allergies  He would like you to call something into [facility]  I was able to make an appt  for him to see you on Tues, 05/22

## 2018-05-14 NOTE — TELEPHONE ENCOUNTER
I cannot prescribe medications for conditions that I have not evaluated the patient for  Heart burn especially needs to be assessed to rule out any cardiac involvement as these conditions can have similar symptoms  Please see if the patient can come in today or this week  Otherwise he can try OTC mediations until I see him  Any chest pain, dizziness, sweating, Shortness of breath, pt should go to ER

## 2018-05-14 NOTE — TELEPHONE ENCOUNTER
Patient called back and I made an appointment for him to be seen tomorrow at 8:30 with Constantino Rivas at the [place] office

## 2018-05-15 ENCOUNTER — OFFICE VISIT (OUTPATIENT)
Dept: INTERNAL MEDICINE CLINIC | Age: 38
End: 2018-05-15
Payer: COMMERCIAL

## 2018-05-15 VITALS
HEIGHT: 69 IN | HEART RATE: 97 BPM | SYSTOLIC BLOOD PRESSURE: 146 MMHG | WEIGHT: 297.6 LBS | TEMPERATURE: 97.1 F | DIASTOLIC BLOOD PRESSURE: 82 MMHG | RESPIRATION RATE: 20 BRPM | OXYGEN SATURATION: 98 % | BODY MASS INDEX: 44.08 KG/M2

## 2018-05-15 DIAGNOSIS — E11.65 TYPE 2 DIABETES MELLITUS WITH HYPERGLYCEMIA, WITHOUT LONG-TERM CURRENT USE OF INSULIN (HCC): ICD-10-CM

## 2018-05-15 DIAGNOSIS — J30.1 SEASONAL ALLERGIC RHINITIS DUE TO POLLEN: Primary | ICD-10-CM

## 2018-05-15 DIAGNOSIS — K21.9 GASTROESOPHAGEAL REFLUX DISEASE, ESOPHAGITIS PRESENCE NOT SPECIFIED: ICD-10-CM

## 2018-05-15 PROBLEM — J30.9 ALLERGIC RHINITIS: Status: ACTIVE | Noted: 2018-05-15

## 2018-05-15 PROCEDURE — 99214 OFFICE O/P EST MOD 30 MIN: CPT | Performed by: NURSE PRACTITIONER

## 2018-05-15 RX ORDER — FLUTICASONE PROPIONATE 50 MCG
1 SPRAY, SUSPENSION (ML) NASAL DAILY
Qty: 16 G | Refills: 0 | Status: SHIPPED | OUTPATIENT
Start: 2018-05-15 | End: 2018-06-19 | Stop reason: ALTCHOICE

## 2018-05-15 RX ORDER — OMEPRAZOLE 20 MG/1
20 CAPSULE, DELAYED RELEASE ORAL DAILY
Qty: 30 CAPSULE | Refills: 0 | Status: SHIPPED | OUTPATIENT
Start: 2018-05-15 | End: 2018-06-19 | Stop reason: ALTCHOICE

## 2018-05-15 NOTE — PROGRESS NOTES
Assessment/Plan:    GERD:  Will start omeprazole  Discussed this will neutralize the secretions in your stomach to reduce reflux symptoms  This medication should be taken on a daily basis  You may use OTC tums as needed  Recommend small frequent meals throughout the day  Avoid aggravating foods - spicy foods, acidic foods - such as tomato and citrus  Avoid alcohol  Do not lay down for at least 30 mins after eating  Allergic Rhinitis:  Will start flonase  Continue with OTC allergy medication - claritin or zyrtec  Would recommend using glasses instead of contacts as the majority of your s/sx are eye related and your contacts will be aggravating  Diagnoses and all orders for this visit:    Seasonal allergic rhinitis due to pollen  -     fluticasone (FLONASE) 50 mcg/act nasal spray; 1 spray into each nostril daily    Type 2 diabetes mellitus with hyperglycemia, without long-term current use of insulin ([facility])    Gastroesophageal reflux disease, esophagitis presence not specified  -     omeprazole (PriLOSEC) 20 mg delayed release capsule; Take 1 capsule (20 mg total) by mouth daily        Subjective:      Patient ID: Jodi Zavala is a 40 y o  male  HPI    GERD  Patient complains of dyspepsia  Symptoms include belching and bilious reflux  The patient denies abdominal bloating, chest pain, cough and early satiety  Symptoms appear to be worsened by carbonated beverages, citrus juices, fatty foods, large meals, lying down and tomato sauce  Risk factors present for GERD include tobacco abuse and obesity  Studies performed so far include none  Treatments tried so far include Pt took his Aunts GERD medication and helped  Currently, the symptoms are moderate    Seasonal Allergies  Pt states that his allergies are flaring up  He is taking OTC zyrtec without much relief  Symptoms include B/L eye itching/burning, rhinorrhea  Denies sinus pressure, post nasal drip, ear pain, cough    Pt does wear contacts and glasses  The following portions of the patient's history were reviewed and updated as appropriate: allergies, current medications, past family history, past medical history, past social history, past surgical history and problem list     Review of Systems   Constitutional: Negative for chills, fatigue and fever  HENT: Negative for congestion, ear discharge, ear pain, postnasal drip, sinus pain, sinus pressure and sore throat  Eyes: Positive for itching  Negative for pain, discharge, redness and visual disturbance  Respiratory: Negative for cough, shortness of breath and wheezing  Cardiovascular: Negative for chest pain, palpitations and leg swelling  Gastrointestinal: Negative for abdominal pain, blood in stool, constipation, diarrhea, nausea and vomiting  As noted in HPI   Neurological: Negative for dizziness, light-headedness and headaches  Past Medical History:   Diagnosis Date    Asthma     Diabetes mellitus ([facility] [address] )     GERD (gastroesophageal reflux disease)     Hypertension          Current Outpatient Prescriptions:     glucose blood (FREESTYLE LITE) test strip, Check blood sugar 3 times daily, Disp: 100 each, Rfl: 5    glucose monitoring kit (FREESTYLE) monitoring kit, Test blood sugar 3 times daily, Disp: 1 each, Rfl: 0    insulin glargine (BASAGLAR KWIKPEN) injection pen 100 units/mL, Inject 40 Units of insulin subcutaneously at bedtime  , Disp: 6 pen, Rfl: 0    Insulin Pen Needle 31G X 5 MM MISC, Use one needle daily, Disp: 90 each, Rfl: 0    Lancets (FREESTYLE) lancets, Check blood sugar 3 times daily, Disp: 100 each, Rfl: 0    metFORMIN (GLUCOPHAGE) 500 mg tablet, Take 1 tablet (500 mg total) by mouth 3 (three) times a day with meals, Disp: 90 tablet, Rfl: 5    Allergies   Allergen Reactions    Dust Mite Extract Other (See Comments) and Nasal Congestion     Itchy eyes    Pollen Extract Other (See Comments) and Nasal Congestion     Itchy eyes       Social History Past Surgical History:   Procedure Laterality Date    NO PAST SURGERIES       Family History   Problem Relation Age of Onset    Cancer Mother     Asthma Mother     Diabetes Mother     Hypertension Mother     Diabetes Father     Hypertension Father        Objective:  /82 (BP Location: Left arm, Patient Position: Sitting, Cuff Size: Large)   Pulse 97   Temp (!) 97 1 °F (36 2 °C) (Tympanic)   Resp 20   Ht 5' 9 37" (1 762 m)   Wt 135 kg (297 lb 9 6 oz)   SpO2 98% Comment: room air  BMI 43 48 kg/m²      Physical Exam   Constitutional: He is oriented to person, place, and time  He appears well-developed and well-nourished  No distress  HENT:   Head: Normocephalic and atraumatic  Right Ear: Hearing, tympanic membrane, external ear and ear canal normal    Left Ear: Hearing, tympanic membrane, external ear and ear canal normal    Nose: Rhinorrhea (clear) present  No mucosal edema  Right sinus exhibits no maxillary sinus tenderness and no frontal sinus tenderness  Left sinus exhibits no maxillary sinus tenderness and no frontal sinus tenderness  Mouth/Throat: Uvula is midline, oropharynx is clear and moist and mucous membranes are normal    Eyes: Lids are normal  Right conjunctiva is not injected  Right conjunctiva has no hemorrhage  Left conjunctiva is not injected  Left conjunctiva has no hemorrhage  Neck: Neck supple  Cardiovascular: Normal rate, regular rhythm and normal heart sounds  Pulmonary/Chest: Effort normal and breath sounds normal  No respiratory distress  He has no wheezes  Abdominal: Soft  Bowel sounds are normal  He exhibits no distension and no mass  There is no tenderness  There is no rebound and no guarding  Lymphadenopathy:     He has no cervical adenopathy  Neurological: He is alert and oriented to person, place, and time  Skin: Skin is warm and dry  Psychiatric: He has a normal mood and affect   His behavior is normal  Judgment and thought content normal  Nursing note and vitals reviewed

## 2018-05-15 NOTE — PATIENT INSTRUCTIONS
GERD:  Will start omeprazole  Discussed this will neutralize the secretions in your stomach to reduce reflux symptoms  This medication should be taken on a daily basis  You may use OTC tums as needed  Recommend small frequent meals throughout the day  Avoid aggravating foods - spicy foods, acidic foods - such as tomato and citrus  Avoid alcohol  Do not lay down for at least 30 mins after eating  Allergic Rhinitis:  Will start flonase  Continue with OTC allergy medication - claritin or zyrtec  Would recommend using glasses instead of contacts as the majority of your s/sx are eye related and your contacts will be aggravating       Pt declined AVS

## 2018-05-23 DIAGNOSIS — E11.65 TYPE 2 DIABETES MELLITUS WITH HYPERGLYCEMIA, WITHOUT LONG-TERM CURRENT USE OF INSULIN (HCC): ICD-10-CM

## 2018-05-24 ENCOUNTER — OFFICE VISIT (OUTPATIENT)
Dept: INTERNAL MEDICINE CLINIC | Age: 38
End: 2018-05-24
Payer: COMMERCIAL

## 2018-05-24 VITALS
SYSTOLIC BLOOD PRESSURE: 142 MMHG | TEMPERATURE: 97.8 F | HEIGHT: 69 IN | WEIGHT: 297.8 LBS | BODY MASS INDEX: 44.11 KG/M2 | OXYGEN SATURATION: 98 % | HEART RATE: 85 BPM | DIASTOLIC BLOOD PRESSURE: 96 MMHG

## 2018-05-24 DIAGNOSIS — J30.1 SEASONAL ALLERGIC RHINITIS DUE TO POLLEN: ICD-10-CM

## 2018-05-24 DIAGNOSIS — I10 ESSENTIAL HYPERTENSION: Primary | ICD-10-CM

## 2018-05-24 DIAGNOSIS — E11.65 TYPE 2 DIABETES MELLITUS WITH HYPERGLYCEMIA, WITHOUT LONG-TERM CURRENT USE OF INSULIN (HCC): ICD-10-CM

## 2018-05-24 PROCEDURE — 99214 OFFICE O/P EST MOD 30 MIN: CPT | Performed by: NURSE PRACTITIONER

## 2018-05-24 RX ORDER — FEXOFENADINE HCL 180 MG/1
180 TABLET ORAL DAILY
Qty: 30 TABLET | Refills: 5 | Status: SHIPPED | OUTPATIENT
Start: 2018-05-24 | End: 2018-06-19 | Stop reason: ALTCHOICE

## 2018-05-24 NOTE — PATIENT INSTRUCTIONS
Continue to check blood sugar 3 times a day  BRING LOG WITH TO NEXT VISIT  Reduce dose of insulin to 35 units at night, if you continue to run less than 90, please decrease to 30  If blood sugar is continuously less than 90 or greater than 200 please call office  Check blood pressure at home and keep track of readings  Bring with to next visit     Go for labs prior to next appointment   Start allegra daily as needed for allergies

## 2018-05-24 NOTE — PROGRESS NOTES
Assessment/Plan:     Diagnoses and all orders for this visit:    Essential hypertension        -    BP elevated on multiple occasions at this point  Recommended starting amlodipine 5 mg daily  Patient stated that he would refuse to take the medication if I prescribed it  He states that he is going to exercise more and check his blood pressure at home  Asked him to bring a log of his recordings  To his next visit  He states if it is elevated at the next appointment in 1 month he will be willing to go on a medication  Type 2 diabetes mellitus with hyperglycemia, without long-term current use of insulin (HCC)  -     insulin glargine (BASAGLAR KWIKPEN) 100 units/mL injection pen; Inject 35 Units of insulin subcutaneously at bedtime  Dispense 4 pens for one month supply per pharm  -     Basic metabolic panel; Future  -     HEMOGLOBIN A1C W/ EAG ESTIMATION; Future  -      Decreased insulin from 40 units q h s  To 35 units q h s     Advised patient that if blood sugar continues to be less than 90 he is to decrease further to 30 units q h s  He was given parameters that if blood sugars running less than 90 or greater than 200 he is to call the office  Follow-up in 1 month with his Hb A1c prior to appointment  Seasonal allergic rhinitis due to pollen  -     fexofenadine (ALLEGRA) 180 MG tablet; Take 1 tablet (180 mg total) by mouth daily              Subjective:      Patient ID: Libra Vargas is a 40 y o  male  HPI     Diabetes Mellitus  Patient presents for follow up of diabetes  Type 2  Current symptoms include: hypoglycemia lowest 53 fasting  he did not eat anything the night before    Symptoms have been intermittent  Patient denies foot ulcerations, hyperglycemia, increased appetite, nausea, polydipsia, polyuria, visual disturbances, vomiting and weight loss  Home sugars: he is checking his BS 3 times a day  The highest his fasting has been is 125  On average it is about 85   Throughout the day his BS remains around 100 or below  Current treatment: metformin 500mg tid and insulin glargine 40 units qhs  Hypertension  Patient previously has dx of HTN  He was previously on lisinopril 10mg daily and HCTZ 25 mg daily  Upon establishing care here his kidney function was impaired and BP was well controlled so we stopped his BP medications  He had been well controlled since  He now has been running high at his last few appointments  Seasonal allergies   does not like being on Flonase  Darrell Priest works very well for him and would like to start ESHA Kamara  The following portions of the patient's history were reviewed and updated as appropriate: allergies, current medications, past family history, past medical history, past social history, past surgical history and problem list     Review of Systems   Constitutional: Negative for activity change, appetite change, chills, diaphoresis, fatigue, fever and unexpected weight change  Respiratory: Negative for cough, chest tightness, shortness of breath and wheezing  Cardiovascular: Negative for chest pain, palpitations and leg swelling  Endocrine: Negative for polydipsia, polyphagia and polyuria  Genitourinary: Negative for frequency  Neurological: Negative for dizziness, syncope, light-headedness and headaches  Past Medical History:   Diagnosis Date    Asthma     Diabetes mellitus ([facility] [address] )     GERD (gastroesophageal reflux disease)     Hypertension          Current Outpatient Prescriptions:     fluticasone (FLONASE) 50 mcg/act nasal spray, 1 spray into each nostril daily, Disp: 16 g, Rfl: 0    glucose blood (FREESTYLE LITE) test strip, Check blood sugar 3 times daily, Disp: 100 each, Rfl: 5    insulin glargine (BASAGLAR KWIKPEN) injection pen 100 units/mL, Inject 40 Units of insulin subcutaneously at bedtime   Dispense 4 pens for one month supply per pharm, Disp: 4 pen, Rfl: 5    Insulin Pen Needle 31G X 5 MM MISC, Use one needle daily, Disp: 90 each, Rfl: 0    Lancets (FREESTYLE) lancets, Check blood sugar 3 times daily, Disp: 100 each, Rfl: 0    metFORMIN (GLUCOPHAGE) 500 mg tablet, Take 1 tablet (500 mg total) by mouth 3 (three) times a day with meals, Disp: 90 tablet, Rfl: 5    omeprazole (PriLOSEC) 20 mg delayed release capsule, Take 1 capsule (20 mg total) by mouth daily, Disp: 30 capsule, Rfl: 0    glucose monitoring kit (FREESTYLE) monitoring kit, Test blood sugar 3 times daily, Disp: 1 each, Rfl: 0    Allergies   Allergen Reactions    Dust Mite Extract Other (See Comments) and Nasal Congestion     Itchy eyes    Pollen Extract Other (See Comments) and Nasal Congestion     Itchy eyes       Social History   Past Surgical History:   Procedure Laterality Date    NO PAST SURGERIES       Family History   Problem Relation Age of Onset    Cancer Mother     Asthma Mother     Diabetes Mother     Hypertension Mother     Diabetes Father     Hypertension Father        Objective:  /96 (BP Location: Left arm, Patient Position: Sitting)   Pulse 85   Temp 97 8 °F (36 6 °C) (Tympanic)   Ht 5' 8 82" (1 748 m)   Wt 135 kg (297 lb 12 8 oz)   SpO2 98%   BMI 44 21 kg/m²      Physical Exam   Constitutional: He is oriented to person, place, and time  He appears well-developed and well-nourished  No distress  obese   HENT:   Head: Normocephalic and atraumatic  Neck: Normal range of motion  Neck supple  No thyromegaly present  Cardiovascular: Normal rate, regular rhythm and normal heart sounds  No murmur heard  Pulmonary/Chest: Effort normal and breath sounds normal  No respiratory distress  He has no wheezes  Musculoskeletal: He exhibits no edema  Lymphadenopathy:     He has no cervical adenopathy  Neurological: He is alert and oriented to person, place, and time  Skin: Skin is warm and dry  He is not diaphoretic  Psychiatric: He has a normal mood and affect   His behavior is normal

## 2018-06-19 ENCOUNTER — HOSPITAL ENCOUNTER (EMERGENCY)
Facility: HOSPITAL | Age: 38
Discharge: HOME | End: 2018-06-19
Admitting: EMERGENCY MEDICINE
Payer: COMMERCIAL

## 2018-06-19 ENCOUNTER — TELEPHONE (OUTPATIENT)
Dept: INTERNAL MEDICINE CLINIC | Age: 38
End: 2018-06-19

## 2018-06-19 VITALS
OXYGEN SATURATION: 98 % | BODY MASS INDEX: 44.98 KG/M2 | TEMPERATURE: 97.6 F | DIASTOLIC BLOOD PRESSURE: 94 MMHG | HEART RATE: 78 BPM | SYSTOLIC BLOOD PRESSURE: 160 MMHG | RESPIRATION RATE: 18 BRPM | WEIGHT: 303 LBS

## 2018-06-19 DIAGNOSIS — S61.219A FINGER LACERATION: Primary | ICD-10-CM

## 2018-06-19 DIAGNOSIS — E11.65 TYPE 2 DIABETES MELLITUS WITH HYPERGLYCEMIA, WITHOUT LONG-TERM CURRENT USE OF INSULIN (HCC): ICD-10-CM

## 2018-06-19 PROCEDURE — 99282 EMERGENCY DEPT VISIT SF MDM: CPT

## 2018-06-19 RX ORDER — LIDOCAINE HYDROCHLORIDE 10 MG/ML
5 INJECTION, SOLUTION EPIDURAL; INFILTRATION; INTRACAUDAL; PERINEURAL ONCE
Status: COMPLETED | OUTPATIENT
Start: 2018-06-19 | End: 2018-06-19

## 2018-06-19 RX ORDER — CEPHALEXIN 500 MG/1
500 CAPSULE ORAL EVERY 8 HOURS SCHEDULED
Qty: 21 CAPSULE | Refills: 0 | Status: SHIPPED | OUTPATIENT
Start: 2018-06-19 | End: 2018-06-26

## 2018-06-19 RX ADMIN — LIDOCAINE HYDROCHLORIDE 5 ML: 10 INJECTION, SOLUTION EPIDURAL; INFILTRATION; INTRACAUDAL; PERINEURAL at 15:36

## 2018-06-19 NOTE — ED PROVIDER NOTES
History  Chief Complaint   Patient presents with    Finger Laceration     Patient presents with laceration to left 3rd digit, bleeding controlled with dressing placed pta  Reports that he was "cutting weeds with a knife", and accidentally struck finger  Last tetanus shot 10 months ago  40year old male presents today complaining of laceration to left 3rd finger  Was using a knife to cut a piece of rope just prior to arrival  Denies any foreign body sensation and adamantly denies chance of foreign bodies  Reports full range of motion, no changes in sensation or color change  No active bleeding  Tetanus is UTD  Prior to Admission Medications   Prescriptions Last Dose Informant Patient Reported? Taking?   insulin glargine (BASAGLAR KWIKPEN) 100 units/mL injection pen   No Yes   Sig: Inject 35 Units of insulin subcutaneously at bedtime  Dispense 4 pens for one month supply per pharm      Facility-Administered Medications: None       Past Medical History:   Diagnosis Date    Asthma     [facility] mellitus ([facility] [address] )     GERD (gastroesophageal reflux disease)     Hypertension        Past Surgical History:   Procedure Laterality Date    NO PAST SURGERIES         Family History   Problem Relation Age of Onset    Cancer Mother     Asthma Mother     Diabetes Mother     Hypertension Mother     Diabetes Father     Hypertension Father      I have reviewed and agree with the history as documented  Social History   Substance Use Topics    Smoking status: Current Some Day Smoker     Types: Cigarettes    Smokeless tobacco: Never Used      Comment: approximately 4 per day on stressfull days    Alcohol use Yes      Comment: occasionally        Review of Systems   Skin: Positive for wound  All other systems reviewed and are negative  Physical Exam  Physical Exam   Constitutional: He appears well-developed and well-nourished  No distress  HENT:   Head: Normocephalic and atraumatic     Cardiovascular: Normal rate  Pulmonary/Chest: No respiratory distress  Musculoskeletal:        Left hand: He exhibits laceration  He exhibits normal range of motion, no tenderness, no bony tenderness, normal two-point discrimination, normal capillary refill, no deformity and no swelling  Normal sensation noted  Hands:  Neurological: No sensory deficit  Skin: Capillary refill takes less than 2 seconds  He is not diaphoretic  Psychiatric: He has a normal mood and affect  Vital Signs  ED Triage Vitals [06/19/18 1416]   Temperature Pulse Respirations Blood Pressure SpO2   97 6 °F (36 4 °C) 78 18 160/94 98 %      Temp Source Heart Rate Source Patient Position - Orthostatic VS BP Location FiO2 (%)   Temporal Monitor Sitting Right arm --      Pain Score       Worst Possible Pain           Vitals:    06/19/18 1416   BP: 160/94   Pulse: 78   Patient Position - Orthostatic VS: Sitting       Visual Acuity      ED Medications  Medications   lidocaine (PF) (XYLOCAINE-MPF) 1 % injection 5 mL (5 mL Infiltration Given 6/19/18 1536)       Diagnostic Studies  Results Reviewed     None                 No orders to display              Procedures  Lac Repair  Date/Time: 6/19/2018 4:07 PM  Performed by: Gatito Daly  Authorized by: Gatito Daly   Consent: Verbal consent obtained    Risks and benefits: risks, benefits and alternatives were discussed  Consent given by: patient  Body area: upper extremity  Location details: left long finger  Laceration length: 2 cm  Tendon involvement: none  Nerve involvement: none  Vascular damage: no    Anesthesia:  Local Anesthetic: lidocaine 1% without epinephrine  Anesthetic total: 3 mL      Procedure Details:  Skin closure: 4-0 nylon (4)  Number of sutures: 4  Technique: simple  Approximation: close  Dressing: 4x4 sterile gauze  Patient tolerance: Patient tolerated the procedure well with no immediate complications             Phone Contacts  ED Phone Contact    ED Course MDM  Number of Diagnoses or Management Options  Finger laceration:   Diagnosis management comments: Wound care instructions reviewed  Finger splint applied  Pt has follow-up with his PCP in 6 days, advised to show the wound to his PCP  Advised pt to have sutures removed by his PCP or at [facility] in 14 days  Do not submerge in water  Return for any redness or purulence  CritCare Time    Disposition  Final diagnoses:   Finger laceration     Time reflects when diagnosis was documented in both MDM as applicable and the Disposition within this note     Time User Action Codes Description Comment    6/19/2018  3:50 PM Gregory Rahman 38 laceration       ED Disposition     ED Disposition Condition Comment    Discharge  Halima Mandujano discharge to home/self care  Pt discharged by [facility] independently of nursing staff  Condition at discharge: Good        Follow-up Information     Follow up With Specialties Details Why Contact Info Additional Information    Umesh Henry MD Internal Medicine   [facility] [phone] [facility] [address]  [phone]       [address] [facility] 34 Urgent Care In 14 days For suture removal [address] [facility] [address], [place] [address] [facility]  [phone] Via the [address] [facility] [address]) [address] toward [facility]  Take the [facility] [address]  Keep right and follow signs for [address]/ [place]  Merge onto [address] [place] [address]  In a half mile, take the exit for [address] toward [employer] [address]  In 0 7 miles take the [employer]  Merge onto [address]  In 500 feet, turn left on [address] and drive 0 3 miles  [address] will be on your left  [address] toward [facility]  Take the [address] [employer]  Merge onto [address]  In 500 feet, turn left on [address] and drive 0 3 miles   [address] will be on your left  [address]) [address] to [address] towards [facility]  In 0 7 miles take the [facility] [address]  Merge onto [facility]  In 500 feet, turn left on [address] and drive 0 3 miles  [address] will be on your left  Discharge Medication List as of 6/19/2018  3:52 PM      START taking these medications    Details   cephalexin (KEFLEX) 500 mg capsule Take 1 capsule (500 mg total) by mouth every 8 (eight) hours for 7 days, Starting Tue 6/19/2018, Until Tue 6/26/2018, Print         CONTINUE these medications which have NOT CHANGED    Details   insulin glargine (BASAGLAR KWIKPEN) 100 units/mL injection pen Inject 35 Units of insulin subcutaneously at bedtime  Dispense 4 pens for one month supply per pharm, Normal           No discharge procedures on file      ED Provider  Electronically Signed by           Farrah Choudhury PA-C  06/19/18 9767

## 2018-06-19 NOTE — TELEPHONE ENCOUNTER
Pen needles sent to pharmacy  Reviewed with pt HbA1C anf BMP orders already in chart and to have done by next Monday's appt  He lowered his insulin to 30 units daily and his fasting BS is 113-116 which I am happy with

## 2018-06-19 NOTE — DISCHARGE INSTRUCTIONS
Finger Laceration   WHAT YOU NEED TO KNOW:   A finger laceration is a deep cut in your skin  It is often caused by a sharp object, such as a knife, or blunt force to your finger  Your blood vessels, bones, joints, tendons, or nerves may also be injured  DISCHARGE INSTRUCTIONS:   Return to the emergency department if:   · Your wound comes apart  · Blood soaks through your bandage  · You have severe pain in your finger or hand  · Your finger is pale and cold  · You have sudden trouble moving your finger  · Your swelling suddenly gets worse  · You have red streaks on your skin coming from your wound  Contact your healthcare provider or hand specialist if:   · You have new numbness or tingling  · Your finger feels warm, looks swollen or red, and is draining pus  · You have a fever  · You have questions or concerns about your condition or care  Medicines: You may  need any of the following:  · Antibiotics  help prevent a bacterial infection  · Acetaminophen  decreases pain and fever  It is available without a doctor's order  Ask how much to take and how often to take it  Follow directions  Read the labels of all other medicines you are using to see if they also contain acetaminophen, or ask your doctor or pharmacist  Acetaminophen can cause liver damage if not taken correctly  Do not use more than 4 grams (4,000 milligrams) total of acetaminophen in one day  · Prescription pain medicine  may be given  Ask your healthcare provider how to take this medicine safely  Some prescription pain medicines contain acetaminophen  Do not take other medicines that contain acetaminophen without talking to your healthcare provider  Too much acetaminophen may cause liver damage  Prescription pain medicine may cause constipation  Ask your healthcare provider how to prevent or treat constipation  · Take your medicine as directed    Contact your healthcare provider if you think your medicine is not helping or if you have side effects  Tell him or her if you are allergic to any medicine  Keep a list of the medicines, vitamins, and herbs you take  Include the amounts, and when and why you take them  Bring the list or the pill bottles to follow-up visits  Carry your medicine list with you in case of an emergency  Self-care:   · Apply ice  on your finger for 15 to 20 minutes every hour or as directed  Use an ice pack, or put crushed ice in a plastic bag  Cover it with a towel before you apply it to your skin  Ice helps prevent tissue damage and decreases swelling and pain  · Elevate  your hand above the level of your heart as often as you can  This will help decrease swelling and pain  Prop your hand on pillows or blankets to keep it elevated comfortably  · Wear your splint as directed  A splint will decrease movement and stress on your wound  The splint may help your wound heal faster  Ask your healthcare provider how to apply and remove a splint  · Apply ointments to decrease scarring  Do not apply ointments until your healthcare provider says it is okay  You may need to wait until your wound is healed  Ask which ointment to buy and how often to use it  Wound care:   · Do not get your wound wet until your healthcare provider says it is okay  Do not soak your hand in water  Do not go swimming until your healthcare provider says it is okay  When your healthcare provider says it is okay, carefully wash around the wound with soap and water  Let soap and water run over your wound  Gently pat the area dry or allow it to air dry  · Change your bandages when they get wet, dirty, or after washing  Apply new, clean bandages as directed  Do not apply elastic bandages or tape too tightly  Do not put powders or lotions on your wound  · Apply antibiotic ointment as directed  Your healthcare provider may give you antibiotic ointment to put over your wound if you have stitches   If you have Strips-Strips over your wound, let them dry up and fall off on their own  If they do not fall off within 14 days, gently remove them  If you have glue over your wound, do not remove or pick at it  If your glue comes off, do not replace it with glue that you have at home  · Check your wound every day for signs of infection  Signs of infection include swelling, redness, or pus  Follow up with your healthcare provider or hand specialist in 2 days:  Write down your questions so you remember to ask them during your visits  © 2017 [address] [employer]  Information is for End User's use only and may not be sold, redistributed or otherwise used for commercial purposes  All illustrations and images included in CareNotes® are the copyrighted property of [employer] , [employer]  or [employer]  The above information is an  only  It is not intended as medical advice for individual conditions or treatments  Talk to your doctor, nurse or pharmacist before following any medical regimen to see if it is safe and effective for you

## 2018-06-19 NOTE — TELEPHONE ENCOUNTER
Patient would also like to talk to Dian Fisher he has questions about the lab work he is to have done

## 2018-06-25 ENCOUNTER — OFFICE VISIT (OUTPATIENT)
Dept: INTERNAL MEDICINE CLINIC | Facility: CLINIC | Age: 38
End: 2018-06-25
Payer: COMMERCIAL

## 2018-06-25 ENCOUNTER — APPOINTMENT (OUTPATIENT)
Dept: LAB | Age: 38
End: 2018-06-25
Payer: COMMERCIAL

## 2018-06-25 VITALS
SYSTOLIC BLOOD PRESSURE: 138 MMHG | HEART RATE: 72 BPM | OXYGEN SATURATION: 98 % | WEIGHT: 302 LBS | DIASTOLIC BLOOD PRESSURE: 98 MMHG | TEMPERATURE: 98.7 F | BODY MASS INDEX: 44.73 KG/M2 | HEIGHT: 69 IN

## 2018-06-25 DIAGNOSIS — I10 ESSENTIAL HYPERTENSION: ICD-10-CM

## 2018-06-25 DIAGNOSIS — Z79.4 TYPE 2 DIABETES MELLITUS WITH HYPERGLYCEMIA, WITH LONG-TERM CURRENT USE OF INSULIN (HCC): Primary | ICD-10-CM

## 2018-06-25 DIAGNOSIS — E11.65 TYPE 2 DIABETES MELLITUS WITH HYPERGLYCEMIA, WITHOUT LONG-TERM CURRENT USE OF INSULIN (HCC): ICD-10-CM

## 2018-06-25 DIAGNOSIS — IMO0001 CLASS 3 OBESITY DUE TO EXCESS CALORIES WITHOUT SERIOUS COMORBIDITY WITH BODY MASS INDEX (BMI) OF 40.0 TO 44.9 IN ADULT: ICD-10-CM

## 2018-06-25 DIAGNOSIS — E11.65 TYPE 2 DIABETES MELLITUS WITH HYPERGLYCEMIA, WITH LONG-TERM CURRENT USE OF INSULIN (HCC): Primary | ICD-10-CM

## 2018-06-25 DIAGNOSIS — F43.20 ADULT SITUATIONAL STRESS DISORDER: ICD-10-CM

## 2018-06-25 LAB
ANION GAP SERPL CALCULATED.3IONS-SCNC: 7 MMOL/L (ref 4–13)
BUN SERPL-MCNC: 13 MG/DL (ref 5–25)
CALCIUM SERPL-MCNC: 8.9 MG/DL (ref 8.3–10.1)
CHLORIDE SERPL-SCNC: 110 MMOL/L (ref 100–108)
CO2 SERPL-SCNC: 25 MMOL/L (ref 21–32)
CREAT SERPL-MCNC: 1.08 MG/DL (ref 0.6–1.3)
EST. AVERAGE GLUCOSE BLD GHB EST-MCNC: 131 MG/DL
GFR SERPL CREATININE-BSD FRML MDRD: 101 ML/MIN/1.73SQ M
GLUCOSE P FAST SERPL-MCNC: 101 MG/DL (ref 65–99)
HBA1C MFR BLD: 6.2 % (ref 4.2–6.3)
POTASSIUM SERPL-SCNC: 3.8 MMOL/L (ref 3.5–5.3)
SODIUM SERPL-SCNC: 142 MMOL/L (ref 136–145)

## 2018-06-25 PROCEDURE — 36415 COLL VENOUS BLD VENIPUNCTURE: CPT

## 2018-06-25 PROCEDURE — 99214 OFFICE O/P EST MOD 30 MIN: CPT | Performed by: NURSE PRACTITIONER

## 2018-06-25 PROCEDURE — 83036 HEMOGLOBIN GLYCOSYLATED A1C: CPT

## 2018-06-25 PROCEDURE — 80048 BASIC METABOLIC PNL TOTAL CA: CPT

## 2018-06-25 RX ORDER — LISINOPRIL 10 MG/1
10 TABLET ORAL DAILY
Qty: 30 TABLET | Refills: 1 | Status: SHIPPED | OUTPATIENT
Start: 2018-06-25 | End: 2018-07-02 | Stop reason: SDDI

## 2018-06-25 NOTE — PROGRESS NOTES
Assessment/Plan:     Diagnoses and all orders for this visit:    Type 2 diabetes mellitus with hyperglycemia, with long-term current use of insulin ([facility] [address] )      -      Patient did not have labs drawn before exam   Will follow  Patient stopped metformin on his own and refuses to take pills  Has been compliant with his insulin 30 units q h s  And blood sugars are controlled per his report as he is checking 3 times a day  Class 3 obesity due to excess calories without serious comorbidity with body mass index (BMI) of 40 0 to 44 9 in adult [facility])    Essential hypertension  -     lisinopril (ZESTRIL) 10 mg tablet; Take 1 tablet (10 mg total) by mouth daily  -     Basic metabolic panel; Future        -      Uncontrolled  Patient had poor kidney function when I 1st met him and he had uncontrolled diabetes as well as on diagnosed diabetes at that time, hypertension and was on hydrochlorothiazide and lisinopril  We had initially stopped both of his antihypertensive medications as his blood pressure was well controlled off of them  His kidney function returned to normal   He states that if he would get swollen legs with amlodipine he would refuse to take it  He is agreeable to try lisinopril 10 mg again  Will hold on HCTZ  Have him follow up in 1 month to check his BP as well as kidney function  Adult situational stress disorder         -      Patient appears to be under lot of stress at this time which appears to be multifactorial   I have offered him a referral to Psychiatry as well as psychology which she has refused  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 and felt that it was not beneficial   I also advised that we could trial medication to help with his depression and anxiety the patient states that he refuses to take pills  He states that he will think about these options and follow up in 1 month  Subjective:      Patient ID: Prince Santos is a 40 y o  male      HPI     Hypertension  Patient is here for follow-up of elevated blood pressure  He is not exercising and is not adherent to a low-salt diet  He does notchecks his blood pressure at home  Blood pressure is not well controlled  Current cardiac symptoms: none  Patient denies chest pain, chest pressure/discomfort, dyspnea, exertional chest pressure/discomfort, fatigue, irregular heart beat, lower extremity edema, near-syncope, orthopnea, palpitations and syncope  Cardiovascular risk factors: diabetes mellitus, hypertension, male gender, obesity (BMI >= 30 kg/m2) and smoking/ tobacco exposure  History of target organ damage: none  He is currently taking no medications as he refused at his last visit  Diabetes Mellitus  Patient presents for follow up DM2 Current symptoms include: none  Symptoms have been basically asymptomatic  Patient denies foot ulcerations, hyperglycemia, hypoglycemia , increased appetite, nausea, paresthesia of the feet, polydipsia, polyuria, visual disturbances and vomiting  Evaluation to date has included: fasting blood sugar and hemoglobin A1C checked this am but labs not back yet  Home sugars: fasting 110-116, 1pm 120-130, before bed 118-119    Current treatment: insulin glargine 30 units qhs  Patient has stopped taking his metformin on his own  He states he stopped it about a month ago  He refuses to take it because he does not like taking pills  He was previously on metformin 500mg tid  Depression  Patient is here for follow up of depression  Current symptoms include: depressed mood and diminished appetite  Symptoms have been gradually worsening since last appointment  Patient Denies suicidal and homicidal thoughts and ideation  Family history significant for depression  Current treatment includes no current prescribed medications, he self medicates by smoking marijuana  He wants to talk to someone about his PTSD   Relates this to his oldest oldest son being shot in [address] recently (son is okay) and his sister passed away while he was in nursing home  Stress is significantly related his children, his wife who was recently sent to [facility], and finances  He smoking Elayne Ma when he "feels down" notes that he does not smoke daily  He works 2:30am - 1 pm  He works this shift to avoid being around people  He is doing well at his job  He denies any SI or HI  He states he sometimes feels he wants to hurt people only when they are bothering him  He tries to avoid being around people  He states that if he smokes marijuana he is very calm and things do not bother him  PHQ-2 - 2  PHQ-9- 1      The following portions of the patient's history were reviewed and updated as appropriate: allergies, current medications, past family history, past medical history, past social history, past surgical history and problem list     Review of Systems   Constitutional: Positive for appetite change (diminished )  Negative for activity change, chills, diaphoresis, fatigue and fever  Eyes: Negative for visual disturbance  Respiratory: Negative for cough, chest tightness, shortness of breath and wheezing  Endocrine: Negative for polydipsia, polyphagia and polyuria  Neurological: Negative for dizziness, light-headedness and headaches  Psychiatric/Behavioral: Positive for dysphoric mood  Negative for self-injury and suicidal ideas  Past Medical History:   Diagnosis Date    Asthma     Diabetes mellitus ([facility] [address] )     GERD (gastroesophageal reflux disease)     Hypertension          Current Outpatient Prescriptions:     cephalexin (KEFLEX) 500 mg capsule, Take 1 capsule (500 mg total) by mouth every 8 (eight) hours for 7 days, Disp: 21 capsule, Rfl: 0    insulin glargine (BASAGLAR KWIKPEN) 100 units/mL injection pen, Inject 35 Units of insulin subcutaneously at bedtime   Dispense 4 pens for one month supply per pharm, Disp: 4 pen, Rfl: 0    Allergies   Allergen Reactions    Dust Mite Extract Other (See Comments) and Nasal Congestion Itchy eyes    Pollen Extract Other (See Comments) and Nasal Congestion     Itchy eyes       Social History   Past Surgical History:   Procedure Laterality Date    NO PAST SURGERIES       Family History   Problem Relation Age of Onset    Cancer Mother     Asthma Mother     Diabetes Mother     Hypertension Mother     Diabetes Father     Hypertension Father        Objective:  /98 (BP Location: Left arm, Patient Position: Sitting, Cuff Size: Large)   Pulse 72   Temp 98 7 °F (37 1 °C) (Oral)   Ht 5' 8 82" (1 748 m)   Wt (!) 137 kg (302 lb)   SpO2 98%   BMI 44 83 kg/m²      Physical Exam   Constitutional: He is oriented to person, place, and time  He appears well-developed and well-nourished  No distress  Morbidly obese   HENT:   Head: Normocephalic and atraumatic  Neck: Neck supple  No thyromegaly present  Cardiovascular: Normal rate, regular rhythm and normal heart sounds  No murmur heard  Pulmonary/Chest: Effort normal and breath sounds normal  No respiratory distress  He has no wheezes  Musculoskeletal: He exhibits no edema  Lymphadenopathy:     He has no cervical adenopathy  Neurological: He is alert and oriented to person, place, and time  Skin: Skin is warm and dry  He is not diaphoretic  Psychiatric: He has a normal mood and affect  His behavior is normal  Judgment and thought content normal    Open to talking about his situation   Stressed out during exam, but cooperative and appropriate

## 2018-07-02 ENCOUNTER — HOSPITAL ENCOUNTER (EMERGENCY)
Facility: HOSPITAL | Age: 38
Discharge: HOME | End: 2018-07-02
Attending: EMERGENCY MEDICINE | Admitting: EMERGENCY MEDICINE
Payer: COMMERCIAL

## 2018-07-02 VITALS
HEART RATE: 78 BPM | OXYGEN SATURATION: 99 % | SYSTOLIC BLOOD PRESSURE: 160 MMHG | DIASTOLIC BLOOD PRESSURE: 94 MMHG | RESPIRATION RATE: 17 BRPM | TEMPERATURE: 97.2 F

## 2018-07-02 DIAGNOSIS — Z48.02 VISIT FOR SUTURE REMOVAL: Primary | ICD-10-CM

## 2018-07-02 PROCEDURE — 99281 EMR DPT VST MAYX REQ PHY/QHP: CPT

## 2018-07-02 NOTE — DISCHARGE INSTRUCTIONS
Stitches Removal   WHAT YOU NEED TO KNOW:   Stitches may need to be removed in 3 to 14 days depending on the location of your wound  Your healthcare provider will use sterile forceps or tweezers to  the knot of each stitch  He will cut the stitch with scissors and pull the stitch out  You may feel a slight tug as the stitch comes out  He may place small steristrips across your wound after the stitches have been removed  These pieces of tape will peel and fall of on their own  Do not pull them off  DISCHARGE INSTRUCTIONS:   Return to the emergency department if:   · Your wound splits open  · You suddenly cannot move your injured joint  · You have sudden numbness around your wound  · You see red streaks coming from your wound  Contact your healthcare provider if:   · You have a fever and chills  · Your wound is red, warm, swollen, or leaking pus  · There is a bad smell coming from your wound  · You have increased pain in the wound area  · You have questions or concerns about your condition or care  Care for your wound:   · Clean your wound as directed  Carefully wash your wound with soap and water  Pat the area dry with a clean towel  · Protect your wound  Your wound can swell, bleed, or split open if it is stretched or bumped  You may need to wear a bandage that supports your wound until it is completely healed  · Minimize your scar  Use sunblock if your wound is exposed to the sun  Apply it every day after the stitches are removed  This will help prevent skin discoloration  Follow up with your healthcare provider as directed: You may need to return in 3 to 5 days if the stitches are on your face  Stitches on your scalp need to be removed after 7 to 14 days  Stitches over joints may remain in place up to 14 days  Write down your questions so you remember to ask them during your visits     © 2017 [address] Information is for End User's use only and may not be sold, redistributed or otherwise used for commercial purposes  All illustrations and images included in CareNotes® are the copyrighted property of A [employer] M [employer] Inc  or [employer]  The above information is an  only  It is not intended as medical advice for individual conditions or treatments  Talk to your doctor, nurse or pharmacist before following any medical regimen to see if it is safe and effective for you  DISCHARGE INSTRUCTIONS:    FOLLOW UP WITH YOUR PRIMARY CARE PROVIDER OR THE [address] [place] [address]  MAKE AN APPOINTMENT TO BE SEEN  TAKE TYLENOL OR MOTRIN IF PAIN  APPLY NEOSPORIN TO THE FINGER     [address]  WATCH FOR SIGNS OF INFECTION: REDNESS, SWELLING OR DISCHARGE     IF SYMPTOMS WORSEN OR NEW SYMPTOMS ARISE, RETURN TO THE ER TO BE SEEN

## 2018-07-02 NOTE — ED PROVIDER NOTES
History  Chief Complaint   Patient presents with    Suture / Staple Removal     pt presents for suture removal to 3rd digit left hand, pt denies redness, swelling, fever, or drainage      37y  o male with PMH of asthma, DM, GERD and HTN presents to the ER for suture removal  Patient was seen on 6/19 after he cut his left middle finger with a knife while cutting a rope  He had 4 sutures placed and was started on antibiotics  He denies redness, swelling or discharge  He denies fever, chills, chest pain, dyspnea, N/V/D, abdominal pain, weakness or paresthesias  History provided by:  Patient   used: No        Prior to Admission Medications   Prescriptions Last Dose Informant Patient Reported? Taking?   insulin glargine (BASAGLAR KWIKPEN) 100 units/mL injection pen   No Yes   Sig: Inject 35 Units of insulin subcutaneously at bedtime  Dispense 4 pens for one month supply per pharm      Facility-Administered Medications: None       Past Medical History:   Diagnosis Date    Asthma     Diabetes mellitus ([address] )     GERD (gastroesophageal reflux disease)     Hypertension        Past Surgical History:   Procedure Laterality Date    NO PAST SURGERIES         Family History   Problem Relation Age of Onset    Cancer Mother     Asthma Mother     Diabetes Mother     Hypertension Mother     Diabetes Father     Hypertension Father      I have reviewed and agree with the history as documented  Social History   Substance Use Topics    Smoking status: Current Some Day Smoker     Types: Cigarettes    Smokeless tobacco: Never Used      Comment: approximately 4 per day on stressfull days    Alcohol use Yes      Comment: occasionally        Review of Systems   Constitutional: Negative for chills and fever  Eyes: Negative for redness  Respiratory: Negative for shortness of breath  Cardiovascular: Negative for chest pain     Gastrointestinal: Negative for abdominal pain, diarrhea, nausea and vomiting  Musculoskeletal: Negative for neck stiffness  Skin: Negative for rash  Allergic/Immunologic: Negative for food allergies  Neurological: Negative for weakness and numbness  Physical Exam  Physical Exam   Constitutional:  Non-toxic appearance  No distress  HENT:   Head: Normocephalic and atraumatic  Right Ear: Tympanic membrane, external ear and ear canal normal  No drainage, swelling or tenderness  No foreign bodies  Tympanic membrane is not erythematous  No hemotympanum  Left Ear: Tympanic membrane, external ear and ear canal normal  No drainage, swelling or tenderness  No foreign bodies  Tympanic membrane is not erythematous  No hemotympanum  Nose: Nose normal    Mouth/Throat: Uvula is midline, oropharynx is clear and moist and mucous membranes are normal  No uvula swelling  No posterior oropharyngeal edema, posterior oropharyngeal erythema or tonsillar abscesses  No tonsillar exudate  Neck: Normal range of motion and phonation normal  Neck supple  No tracheal deviation present  Cardiovascular: Normal rate, regular rhythm, S1 normal, S2 normal and normal heart sounds  Exam reveals no gallop and no friction rub  No murmur heard  Pulmonary/Chest: Effort normal and breath sounds normal  No respiratory distress  He has no decreased breath sounds  He has no wheezes  He has no rhonchi  He has no rales  He exhibits no tenderness  Musculoskeletal:        Left hand: He exhibits laceration (with sutures )  He exhibits normal range of motion, no tenderness, no bony tenderness, normal capillary refill, no deformity and no swelling  Normal sensation noted  Normal strength noted  Hands:  Neurological: He is alert  GCS eye subscore is 4  GCS verbal subscore is 5  GCS motor subscore is 6  Skin: Skin is warm and dry  No rash noted  Psychiatric: He has a normal mood and affect  Nursing note and vitals reviewed        Vital Signs  ED Triage Vitals [07/02/18 0800]   Temperature Pulse Respirations Blood Pressure SpO2   (!) 97 2 °F (36 2 °C) 78 17 160/94 99 %      Temp Source Heart Rate Source Patient Position - Orthostatic VS BP Location FiO2 (%)   Temporal Monitor Sitting Right arm --      Pain Score       --           Vitals:    07/02/18 0800   BP: 160/94   Pulse: 78   Patient Position - Orthostatic VS: Sitting       Visual Acuity      ED Medications  Medications - No data to display    Diagnostic Studies  Results Reviewed     None                 No orders to display              Procedures  Suture Removal  Date/Time: 7/2/2018 8:13 AM  Performed by: Graciela Nice by: Nayana Madsen     Patient location:  ED  Other Assisting Provider: No    Consent:     Consent obtained:  Verbal    Consent given by:  Patient    Risks discussed:  Bleeding, pain and wound separation  Universal protocol:     Procedure explained and questions answered to patient or proxy's satisfaction: yes      Patient identity confirmed:  Arm band  Location:     Laterality:  Left    Location:  Upper extremity    Upper extremity location:  Hand    Hand location:  L long finger  Procedure details: Tools used:  Suture removal kit    Wound appearance:  No sign(s) of infection    Number of sutures removed:  4    Number of staples removed:  0  Post-procedure details:     Post-removal:  No dressing applied    Patient tolerance of procedure: Tolerated well, no immediate complications           Phone Contacts  ED Phone Contact    ED Course                               MDM  Number of Diagnoses or Management Options  Visit for suture removal: new and does not require workup  Diagnosis management comments: DDX consists of but not limited to: suture removal    Will remove sutures  Sutures removed without complications      At discharge, I instructed the patient to:  -follow up with pcp  -take Tylenol or Motrin if pain  -apply Neosporin to the area  -keep area clean  -watch for signs of infection: redness, swelling or discharge  -return to the ER if symptoms worsened or new symptoms arose  Patient agreed to this plan and was stable at time of discharge  Amount and/or Complexity of Data Reviewed  Review and summarize past medical records: yes    Patient Progress  Patient progress: stable    CritCare Time    Disposition  Final diagnoses:   Visit for suture removal     Time reflects when diagnosis was documented in both MDM as applicable and the Disposition within this note     Time User Action Codes Description Comment    7/2/2018  8:18 AM Gabriel OWUSU Add [Z48 02] Visit for suture removal       ED Disposition     ED Disposition Condition Comment    Discharge  Janet Snatos discharge to home/self care  Condition at discharge: Stable        Follow-up Information     Follow up With Specialties Details Why Contact Info    Reji Curry MD Internal Medicine Schedule an appointment as soon as possible for a visit in 1 day  [place] [address]  [address] [address]  [phone]            Discharge Medication List as of 7/2/2018  8:19 AM      CONTINUE these medications which have NOT CHANGED    Details   insulin glargine (BASAGLAR KWIKPEN) 100 units/mL injection pen Inject 35 Units of insulin subcutaneously at bedtime  Dispense 4 pens for one month supply per pharm, Normal           No discharge procedures on file      ED Provider  Electronically Signed by           Gisselle Morley PA-C  07/02/18 8737

## 2018-07-24 ENCOUNTER — OFFICE VISIT (OUTPATIENT)
Dept: INTERNAL MEDICINE CLINIC | Age: 38
End: 2018-07-24
Payer: COMMERCIAL

## 2018-07-24 ENCOUNTER — APPOINTMENT (OUTPATIENT)
Dept: LAB | Age: 38
End: 2018-07-24
Payer: COMMERCIAL

## 2018-07-24 VITALS
DIASTOLIC BLOOD PRESSURE: 108 MMHG | BODY MASS INDEX: 45.47 KG/M2 | HEIGHT: 69 IN | WEIGHT: 307 LBS | OXYGEN SATURATION: 98 % | SYSTOLIC BLOOD PRESSURE: 140 MMHG | TEMPERATURE: 97.4 F | HEART RATE: 71 BPM

## 2018-07-24 DIAGNOSIS — I10 ESSENTIAL HYPERTENSION: ICD-10-CM

## 2018-07-24 DIAGNOSIS — I10 ESSENTIAL HYPERTENSION: Primary | ICD-10-CM

## 2018-07-24 DIAGNOSIS — F43.20 ADULT SITUATIONAL STRESS DISORDER: ICD-10-CM

## 2018-07-24 LAB
ANION GAP SERPL CALCULATED.3IONS-SCNC: 4 MMOL/L (ref 4–13)
BUN SERPL-MCNC: 16 MG/DL (ref 5–25)
CALCIUM SERPL-MCNC: 8.6 MG/DL (ref 8.3–10.1)
CHLORIDE SERPL-SCNC: 111 MMOL/L (ref 100–108)
CO2 SERPL-SCNC: 26 MMOL/L (ref 21–32)
CREAT SERPL-MCNC: 1.26 MG/DL (ref 0.6–1.3)
GFR SERPL CREATININE-BSD FRML MDRD: 84 ML/MIN/1.73SQ M
GLUCOSE P FAST SERPL-MCNC: 94 MG/DL (ref 65–99)
POTASSIUM SERPL-SCNC: 3.9 MMOL/L (ref 3.5–5.3)
SODIUM SERPL-SCNC: 141 MMOL/L (ref 136–145)

## 2018-07-24 PROCEDURE — 99213 OFFICE O/P EST LOW 20 MIN: CPT | Performed by: NURSE PRACTITIONER

## 2018-07-24 PROCEDURE — 80048 BASIC METABOLIC PNL TOTAL CA: CPT

## 2018-07-24 PROCEDURE — 36415 COLL VENOUS BLD VENIPUNCTURE: CPT

## 2018-07-24 RX ORDER — LISINOPRIL 10 MG/1
10 TABLET ORAL DAILY
Qty: 30 TABLET | Refills: 1 | Status: SHIPPED | OUTPATIENT
Start: 2018-07-24 | End: 2018-08-21 | Stop reason: SDUPTHER

## 2018-07-24 NOTE — PATIENT INSTRUCTIONS
High blood pressure - Start lisinopril 10mg daily  I recommend getting a blood pressure cuff and checking daily at home, or checking at pharmacies  Record in log to bring with to next visit  Follow up in 3 weeks  Check lab before next visit   Notify office if greater than 140/90

## 2018-07-24 NOTE — PROGRESS NOTES
Assessment/Plan:     Diagnoses and all orders for this visit:    Essential hypertension  -     lisinopril (ZESTRIL) 10 mg tablet; Take 1 tablet (10 mg total) by mouth daily  -     Basic metabolic panel; Future  -     Pt had reduced kidney function in the past while taking it with HCTZ and had uncontrolled and undiagnosed DM  DM now under control and off HCTZ, will try him back on lisinopril low dose and advised to monitor BP at home  Follow up 2 weeks, BMP check prior to appointment  Adult situational stress disorder         -     Seems to only be interested in medical marijuana  Would need formal diagnosis of PTSD in [place]  Pt not willing to try medications or see a therapist  Rondel Baumgarten he would need to find a doctor who is able to prescribe that if appropriate  Continue to feel that he would benefit from Effexor and talk therapy  Subjective:      Patient ID: Nikole Bond is a 40 y o  male  HPI      Hypertension  Patient is here for follow-up of elevated blood pressure  He is exercising playing basketball a few times a week and is trying to be adherent to a low-salt diet  He does not check his blood pressure at home  Blood pressure is not well controlled  Current cardiac symptoms: none  Patient denies chest pain, chest pressure/discomfort, dyspnea, exertional chest pressure/discomfort, fatigue, irregular heart beat, lower extremity edema, near-syncope, orthopnea, palpitations and syncope  Cardiovascular risk factors: diabetes mellitus, hypertension, male gender, obesity (BMI >= 30 kg/m2) and smoking/ tobacco exposure  History of target organ damage: none  He is currently taking no medications as he refused at his last visit  Smoking about 3-4 cigarettes per day  5 lb weight gain since last visit  Depression  Patient is here for follow up of depression  He is very vague with his symptoms and non specific  He states that his son is doing well who was previously shot in [place]   When asked about the rest of his family he states that "theyve been annoying me a lot" He is not currently on any medications for this and continues to refuse medications  He does self-treat with marijuana about 2-3 times a week  He feels this calms him down and makes him able to approach situations more appropriately and think of better plans for how to deal with things  He has visited a dispensary and was told he needs a referral from his doctor for medical marijuana  I explained that I do not think anxiety and depression are on the approved list for medical marijuana in [place]  I do believe PTSD is listed which pt reports having related to his oldest oldest son being shot in [address] [place] [address] [facility] recently (son is okay) and his sister passed away while he was in nursing home  Unsure if this has ever been formally diagnosed  The patient does drink occasionally but denies drinking in excess  I have offered again for him to see a therapist which he is still not interested in  The following portions of the patient's history were reviewed and updated as appropriate: allergies, current medications, past family history, past medical history, past social history, past surgical history and problem list      Review of Systems   Constitutional: Negative for chills and fever  Respiratory: Negative for cough, chest tightness, shortness of breath and wheezing  Cardiovascular: Negative for chest pain, palpitations and leg swelling  Psychiatric/Behavioral: Positive for dysphoric mood  The patient is nervous/anxious  Past Medical History:   Diagnosis Date    Asthma     Diabetes mellitus ([address] )     GERD (gastroesophageal reflux disease)     Hypertension          Current Outpatient Prescriptions:     insulin glargine (BASAGLAR KWIKPEN) 100 units/mL injection pen, Inject 35 Units of insulin subcutaneously at bedtime   Dispense 4 pens for one month supply per pharm, Disp: 4 pen, Rfl: 0    Allergies   Allergen Reactions    Dust Mite Extract Other (See Comments) and Nasal Congestion     Itchy eyes    Pollen Extract Other (See Comments) and Nasal Congestion     Itchy eyes       Social History   Past Surgical History:   Procedure Laterality Date    NO PAST SURGERIES       Family History   Problem Relation Age of Onset    Asthma Mother     Diabetes Mother     Hypertension Mother     Diabetes Father     Hypertension Father        Objective:  BP (!) 140/108 (BP Location: Left arm, Patient Position: Sitting, Cuff Size: Adult)   Pulse 71   Temp (!) 97 4 °F (36 3 °C) (Tympanic)   Ht 5' 9 49" (1 765 m)   Wt (!) 139 kg (307 lb)   SpO2 98% Comment: room air  BMI 44 70 kg/m²      Physical Exam   Constitutional: He is oriented to person, place, and time  He appears well-developed and well-nourished  No distress  Morbidly obese   HENT:   Head: Normocephalic and atraumatic  Neck: Normal range of motion  Neck supple  Carotid bruit is not present  No thyromegaly present  Cardiovascular: Normal rate, regular rhythm and normal heart sounds  No murmur heard  Pulmonary/Chest: Effort normal and breath sounds normal  No respiratory distress  He has no wheezes  Musculoskeletal: He exhibits no edema  Lymphadenopathy:     He has no cervical adenopathy  Neurological: He is alert and oriented to person, place, and time  Skin: Skin is warm and dry  He is not diaphoretic  Psychiatric: He has a normal mood and affect  His behavior is normal    Vitals reviewed

## 2018-08-21 ENCOUNTER — OFFICE VISIT (OUTPATIENT)
Dept: INTERNAL MEDICINE CLINIC | Age: 38
End: 2018-08-21
Payer: COMMERCIAL

## 2018-08-21 ENCOUNTER — APPOINTMENT (OUTPATIENT)
Dept: LAB | Age: 38
End: 2018-08-21
Payer: COMMERCIAL

## 2018-08-21 VITALS
TEMPERATURE: 97.5 F | HEART RATE: 75 BPM | HEIGHT: 70 IN | DIASTOLIC BLOOD PRESSURE: 100 MMHG | WEIGHT: 303 LBS | SYSTOLIC BLOOD PRESSURE: 126 MMHG | BODY MASS INDEX: 43.38 KG/M2 | OXYGEN SATURATION: 97 %

## 2018-08-21 DIAGNOSIS — E11.65 TYPE 2 DIABETES MELLITUS WITH HYPERGLYCEMIA, WITHOUT LONG-TERM CURRENT USE OF INSULIN (HCC): ICD-10-CM

## 2018-08-21 DIAGNOSIS — N17.9 AKI (ACUTE KIDNEY INJURY) (HCC): ICD-10-CM

## 2018-08-21 DIAGNOSIS — I10 ESSENTIAL HYPERTENSION: ICD-10-CM

## 2018-08-21 DIAGNOSIS — I10 ESSENTIAL HYPERTENSION: Primary | ICD-10-CM

## 2018-08-21 LAB
ANION GAP SERPL CALCULATED.3IONS-SCNC: 5 MMOL/L (ref 4–13)
BUN SERPL-MCNC: 15 MG/DL (ref 5–25)
CALCIUM SERPL-MCNC: 8.6 MG/DL (ref 8.3–10.1)
CHLORIDE SERPL-SCNC: 107 MMOL/L (ref 100–108)
CO2 SERPL-SCNC: 28 MMOL/L (ref 21–32)
CREAT SERPL-MCNC: 1.49 MG/DL (ref 0.6–1.3)
GFR SERPL CREATININE-BSD FRML MDRD: 68 ML/MIN/1.73SQ M
GLUCOSE P FAST SERPL-MCNC: 83 MG/DL (ref 65–99)
POTASSIUM SERPL-SCNC: 4.1 MMOL/L (ref 3.5–5.3)
SODIUM SERPL-SCNC: 140 MMOL/L (ref 136–145)

## 2018-08-21 PROCEDURE — 80048 BASIC METABOLIC PNL TOTAL CA: CPT

## 2018-08-21 PROCEDURE — 4010F ACE/ARB THERAPY RXD/TAKEN: CPT | Performed by: NURSE PRACTITIONER

## 2018-08-21 PROCEDURE — 36415 COLL VENOUS BLD VENIPUNCTURE: CPT

## 2018-08-21 PROCEDURE — 99214 OFFICE O/P EST MOD 30 MIN: CPT | Performed by: NURSE PRACTITIONER

## 2018-08-21 RX ORDER — BLOOD-GLUCOSE METER
1 KIT MISCELLANEOUS 3 TIMES DAILY
Refills: 5 | COMMUNITY
Start: 2018-08-10 | End: 2019-05-09

## 2018-08-21 RX ORDER — LISINOPRIL 20 MG/1
20 TABLET ORAL DAILY
Qty: 30 TABLET | Refills: 1 | Status: SHIPPED | OUTPATIENT
Start: 2018-08-21 | End: 2018-08-27

## 2018-08-21 RX ORDER — PEN NEEDLE, DIABETIC 31 GX5/16"
1 NEEDLE, DISPOSABLE MISCELLANEOUS 3 TIMES DAILY
Refills: 3 | COMMUNITY
Start: 2018-08-10 | End: 2019-05-09

## 2018-08-21 NOTE — PROGRESS NOTES
Assessment/Plan:     Diagnoses and all orders for this visit:    Type 2 diabetes mellitus with hyperglycemia, without long-term current use of insulin (HCC)  -     insulin glargine (BASAGLAR KWIKPEN) 100 units/mL injection pen; Inject 30 Units of insulin subcutaneously at bedtime  Dispense 4 pens for one month supply per pharm        -     Continue insulin 30 units qhs and continue to monitor BS at home  Once BP controlled with stable renal function will discuss oral medications again, pt refuses metformin    Essential hypertension  -     lisinopril (ZESTRIL) 20 mg tablet; Take 1 tablet (20 mg total) by mouth daily        -     Discussed with Dr Gerald Menendez  Pt not controlled, will increase lisinopril to 20mg daily  Close follow up in 3 weeks  Discussed again importance of low salt diet  Pt Asymptomatic         -     Check BMP to monitor renal function     Other orders  -     FREESTYLE LITE test strip; 1 each 3 (three) times a day Check blood sugar  -     B-D UF III MINI PEN NEEDLES 31G X 5 MM MISC; 1 each 3 (three) times a day              Subjective:      Patient ID: Lashawn Schuster is a 45 y o  male  HPI    Hypertension  Patient is here for follow-up of elevated blood pressure  He is exercising by playing basketball every other day and is not adherent to a low-salt diet  He does notchecks his blood pressure at home  Blood pressure is not well controlled  Current cardiac symptoms: none  Patient denies chest pain, chest pressure/discomfort, dyspnea, exertional chest pressure/discomfort, fatigue, irregular heart beat, lower extremity edema, orthopnea, palpitations and syncope  Cardiovascular risk factors: diabetes mellitus, hypertension, male gender, obesity (BMI >= 30 kg/m2), sedentary lifestyle and smoking/ tobacco exposure  History of target organ damage: none  He is currently taking lisinopril 10mg daily  He is compliant with medication use  He is cutting back on cigarette smoking and is not smoking every day   He cannot quantify how much he is smoking because it is quite inconsistent  He is smoking marijuana  Diabetes Mellitus  Patient presents for follow up of diabetes  Current symptoms include: none  Symptoms have been basically asymptomatic  Patient denies foot ulcerations, hyperglycemia, hypoglycemia , nausea, polydipsia, polyuria and vomiting  Home sugars: Fasting blood sugar is around 90  1 hour after eating lunch 90  Current treatment: lantus 30 qhs  Anxiety/Depression  Patient currently self medicating with recreational marijuana  He does not wish to discuss any other treatments today  The following portions of the patient's history were reviewed and updated as appropriate: allergies, current medications, past family history, past medical history, past social history, past surgical history and problem list     Review of Systems   Constitutional: Negative for activity change, appetite change, chills, fever and unexpected weight change  Respiratory: Negative for cough, chest tightness, shortness of breath and wheezing  Cardiovascular: Negative for chest pain, palpitations and leg swelling  Gastrointestinal: Negative for constipation, diarrhea, nausea and vomiting  Endocrine: Negative for polydipsia, polyphagia and polyuria  Neurological: Negative for dizziness, syncope, light-headedness and headaches  Past Medical History:   Diagnosis Date    Asthma     Diabetes mellitus ([facility] [address] )     GERD (gastroesophageal reflux disease)     Hypertension          Current Outpatient Prescriptions:     B-D UF III MINI PEN NEEDLES 31G X 5 MM MISC, 1 each 3 (three) times a day, Disp: , Rfl: 3    FREESTYLE LITE test strip, 1 each 3 (three) times a day Check blood sugar, Disp: , Rfl: 5    insulin glargine (BASAGLAR KWIKPEN) 100 units/mL injection pen, Inject 35 Units of insulin subcutaneously at bedtime   Dispense 4 pens for one month supply per pharm, Disp: 4 pen, Rfl: 0    lisinopril (ZESTRIL) 10 mg tablet, Take 1 tablet (10 mg total) by mouth daily, Disp: 30 tablet, Rfl: 1    Allergies   Allergen Reactions    Dust Mite Extract Other (See Comments) and Nasal Congestion     Itchy eyes    Pollen Extract Other (See Comments) and Nasal Congestion     Itchy eyes       Social History   Past Surgical History:   Procedure Laterality Date    NO PAST SURGERIES       Family History   Problem Relation Age of Onset    Asthma Mother     Diabetes Mother     Hypertension Mother     Diabetes Father     Hypertension Father        Objective:  /100 (BP Location: Left arm, Patient Position: Sitting, Cuff Size: Adult)   Pulse 75   Temp 97 5 °F (36 4 °C) (Tympanic)   Ht 5' 9 69" (1 77 m)   Wt (!) 137 kg (303 lb)   SpO2 97% Comment: ROOM AIR  BMI 43 87 kg/m²      Physical Exam   Constitutional: He is oriented to person, place, and time  He appears well-developed and well-nourished  No distress  Morbidly obese   HENT:   Head: Normocephalic and atraumatic  Eyes: Conjunctivae and EOM are normal  Pupils are equal, round, and reactive to light  Neck: Normal range of motion  Neck supple  Carotid bruit is not present  No thyromegaly present  Cardiovascular: Normal rate, regular rhythm and normal heart sounds  No murmur heard  Pulmonary/Chest: Effort normal and breath sounds normal  No respiratory distress  He has no wheezes  Musculoskeletal: He exhibits no edema  Lymphadenopathy:     He has no cervical adenopathy  Neurological: He is alert and oriented to person, place, and time  Skin: Skin is warm and dry  He is not diaphoretic  Psychiatric: He has a normal mood and affect  His behavior is normal    Vitals reviewed

## 2018-08-21 NOTE — PATIENT INSTRUCTIONS
Increase lisinopril to 20mg daily  Continue basaglar insulin pen 30 units at night  Follow up in 3 weeks   Check BMP

## 2018-08-27 ENCOUNTER — TELEPHONE (OUTPATIENT)
Dept: INTERNAL MEDICINE CLINIC | Facility: CLINIC | Age: 38
End: 2018-08-27

## 2018-08-27 DIAGNOSIS — I10 ESSENTIAL HYPERTENSION: Primary | ICD-10-CM

## 2018-08-27 RX ORDER — AMLODIPINE BESYLATE 5 MG/1
5 TABLET ORAL DAILY
Qty: 30 TABLET | Refills: 0 | Status: SHIPPED | OUTPATIENT
Start: 2018-08-27 | End: 2018-09-12 | Stop reason: SDUPTHER

## 2018-08-27 NOTE — TELEPHONE ENCOUNTER
Spoke to pt on the phone, renal function worsening on lisinopril 10mg daily  At last visit we increased to 20mg daily and he went for BMP  I explained this to the pt this morning and would like him to stop his lisinopril altogether and start amlodipine 5mg daily until his follow up in 2-3 weeks  Will send to pharmacy now       -of note, he did have renal impairment while on lisinopril with HCTZ in the past

## 2018-09-12 ENCOUNTER — OFFICE VISIT (OUTPATIENT)
Dept: INTERNAL MEDICINE CLINIC | Age: 38
End: 2018-09-12
Payer: COMMERCIAL

## 2018-09-12 VITALS
DIASTOLIC BLOOD PRESSURE: 92 MMHG | WEIGHT: 294.2 LBS | TEMPERATURE: 97.2 F | HEART RATE: 76 BPM | BODY MASS INDEX: 42.12 KG/M2 | SYSTOLIC BLOOD PRESSURE: 128 MMHG | OXYGEN SATURATION: 97 % | HEIGHT: 70 IN

## 2018-09-12 DIAGNOSIS — I10 ESSENTIAL HYPERTENSION: ICD-10-CM

## 2018-09-12 DIAGNOSIS — K21.9 GASTROESOPHAGEAL REFLUX DISEASE, ESOPHAGITIS PRESENCE NOT SPECIFIED: ICD-10-CM

## 2018-09-12 DIAGNOSIS — Z79.4 TYPE 2 DIABETES MELLITUS WITH HYPERGLYCEMIA, WITH LONG-TERM CURRENT USE OF INSULIN (HCC): Primary | ICD-10-CM

## 2018-09-12 DIAGNOSIS — E11.65 TYPE 2 DIABETES MELLITUS WITH HYPERGLYCEMIA, WITHOUT LONG-TERM CURRENT USE OF INSULIN (HCC): ICD-10-CM

## 2018-09-12 DIAGNOSIS — E11.65 TYPE 2 DIABETES MELLITUS WITH HYPERGLYCEMIA, WITH LONG-TERM CURRENT USE OF INSULIN (HCC): Primary | ICD-10-CM

## 2018-09-12 PROCEDURE — 99214 OFFICE O/P EST MOD 30 MIN: CPT | Performed by: INTERNAL MEDICINE

## 2018-09-12 PROCEDURE — 3008F BODY MASS INDEX DOCD: CPT | Performed by: INTERNAL MEDICINE

## 2018-09-12 RX ORDER — AMLODIPINE BESYLATE 5 MG/1
5 TABLET ORAL DAILY
Qty: 30 TABLET | Refills: 3 | Status: SHIPPED | OUTPATIENT
Start: 2018-09-12 | End: 2019-06-17 | Stop reason: SDUPTHER

## 2018-09-12 RX ORDER — PANTOPRAZOLE SODIUM 40 MG/1
40 TABLET, DELAYED RELEASE ORAL DAILY
Qty: 30 TABLET | Refills: 3 | Status: SHIPPED | OUTPATIENT
Start: 2018-09-12 | End: 2019-02-18 | Stop reason: ALTCHOICE

## 2018-09-12 NOTE — PROGRESS NOTES
Assessment/Plan:    1  Essential hypertension   repeat blood pressure is 118/76  Right now will continue with amlodipine 5 mg daily  Will repeat his renal function before next visit if kidney function returns to baseline will consider to start him on ACE inhibitor in view of diabetes  2  Type 2 diabetes   hemoglobin A1c in June is 6 4  Will continue with present dose of Lantus  Will check hemoglobin A1c before next visit   advised for better diet control and exercise  3  GERD   on previous visit patient was started on Nexium but according the patient no improvement  Will change it to Protonix 40 mg daily along with significant diet change certain diet discussed with the patient that he should avoid  If no significant improvement then we should recommend him for EGD  4  Acute kidney injury   he was supposed to have repeat renal function today but was not able to do it  On previous labs his renal function was within normal limit  So we will repeat it again with the next blood test      There are no diagnoses linked to this encounter  Subjective:          Patient ID: Yusef Aguilar is a 45 y o  male  Heartburn   He complains of early satiety and heartburn  He reports no abdominal pain, no chest pain, no choking, no coughing, no nausea or no sore throat  This is a recurrent problem  The current episode started more than 1 month ago  The problem occurs frequently  The problem has been gradually worsening  The heartburn duration is several minutes  The heartburn is located in the substernum  The heartburn is of moderate intensity  The heartburn does not wake him from sleep  The heartburn does not limit his activity  The heartburn doesn't change with position  The symptoms are aggravated by certain foods  Pertinent negatives include no anemia, fatigue or melena  Risk factors include obesity, smoking/tobacco exposure and caffeine use  He has tried a PPI for the symptoms   The treatment provided mild relief  Past procedures do not include an abdominal ultrasound or an EGD  The following portions of the patient's history were reviewed and updated as appropriate: allergies, current medications, past family history, past medical history, past social history, past surgical history and problem list     Review of Systems   Constitutional: Negative for fatigue and fever  HENT: Negative for congestion, ear discharge, ear pain, postnasal drip, sinus pressure, sore throat, tinnitus and trouble swallowing  Eyes: Negative for discharge, itching and visual disturbance  Respiratory: Negative for cough, choking and shortness of breath  Cardiovascular: Negative for chest pain and palpitations  Gastrointestinal: Positive for heartburn  Negative for abdominal pain, diarrhea, melena, nausea and vomiting  Endocrine: Negative for cold intolerance and polyuria  Genitourinary: Negative for difficulty urinating, dysuria and urgency  Musculoskeletal: Negative for arthralgias and neck pain  Skin: Negative for rash  Allergic/Immunologic: Negative for environmental allergies  Neurological: Negative for dizziness, weakness and headaches  Psychiatric/Behavioral: Negative for agitation and behavioral problems  The patient is not nervous/anxious  Past Medical History:   Diagnosis Date    Asthma     Diabetes mellitus ([facility] [address] )     GERD (gastroesophageal reflux disease)     Hypertension          Current Outpatient Prescriptions:     amLODIPine (NORVASC) 5 mg tablet, Take 1 tablet (5 mg total) by mouth daily, Disp: 30 tablet, Rfl: 0    B-D UF III MINI PEN NEEDLES 31G X 5 MM MISC, 1 each 3 (three) times a day, Disp: , Rfl: 3    FREESTYLE LITE test strip, 1 each 3 (three) times a day Check blood sugar, Disp: , Rfl: 5    insulin glargine (BASAGLAR KWIKPEN) 100 units/mL injection pen, Inject 30 Units of insulin subcutaneously at bedtime   Dispense 4 pens for one month supply per pharm, Disp: 5 pen, Rfl: 2    Allergies   Allergen Reactions    Dust Mite Extract Other (See Comments) and Nasal Congestion     Itchy eyes    Pollen Extract Other (See Comments) and Nasal Congestion     Itchy eyes       Social History   Past Surgical History:   Procedure Laterality Date    NO PAST SURGERIES       Family History   Problem Relation Age of Onset    Asthma Mother     Diabetes Mother     Hypertension Mother     Diabetes Father     Hypertension Father        Objective:  /92 (BP Location: Left arm, Patient Position: Sitting, Cuff Size: Large)   Pulse 76   Temp (!) 97 2 °F (36 2 °C) (Tympanic)   Ht 5' 9 69" (1 77 m)   Wt 133 kg (294 lb 3 2 oz)   SpO2 97%   BMI 42 60 kg/m²   Body mass index is 42 6 kg/m²  Physical Exam   Constitutional: He appears well-developed  HENT:   Head: Normocephalic  Right Ear: External ear normal    Left Ear: External ear normal    Mouth/Throat: Oropharynx is clear and moist    Eyes: Pupils are equal, round, and reactive to light  No scleral icterus  Neck: Normal range of motion  Neck supple  No tracheal deviation present  No thyromegaly present  Cardiovascular: Normal rate, regular rhythm and normal heart sounds  No murmur heard  Pulmonary/Chest: Effort normal and breath sounds normal  No respiratory distress  He exhibits no tenderness  Abdominal: Soft  Bowel sounds are normal  He exhibits no distension and no mass  There is no tenderness  Musculoskeletal: Normal range of motion  He exhibits no edema or deformity  Lymphadenopathy:     He has no cervical adenopathy  Neurological: He is alert  No cranial nerve deficit  Skin: Skin is warm  Psychiatric: He has a normal mood and affect

## 2018-10-11 ENCOUNTER — LAB (OUTPATIENT)
Dept: LAB | Age: 38
End: 2018-10-11
Payer: COMMERCIAL

## 2018-10-11 ENCOUNTER — OFFICE VISIT (OUTPATIENT)
Dept: INTERNAL MEDICINE CLINIC | Age: 38
End: 2018-10-11
Payer: COMMERCIAL

## 2018-10-11 VITALS
BODY MASS INDEX: 40.63 KG/M2 | DIASTOLIC BLOOD PRESSURE: 88 MMHG | TEMPERATURE: 98.2 F | HEIGHT: 70 IN | OXYGEN SATURATION: 99 % | WEIGHT: 283.8 LBS | HEART RATE: 88 BPM | SYSTOLIC BLOOD PRESSURE: 136 MMHG

## 2018-10-11 DIAGNOSIS — F17.210 CIGARETTE NICOTINE DEPENDENCE WITHOUT COMPLICATION: ICD-10-CM

## 2018-10-11 DIAGNOSIS — Z20.2 STD EXPOSURE: ICD-10-CM

## 2018-10-11 DIAGNOSIS — Z00.00 ANNUAL PHYSICAL EXAM: ICD-10-CM

## 2018-10-11 DIAGNOSIS — N17.9 AKI (ACUTE KIDNEY INJURY) (HCC): ICD-10-CM

## 2018-10-11 DIAGNOSIS — E11.65 TYPE 2 DIABETES MELLITUS WITH HYPERGLYCEMIA, WITH LONG-TERM CURRENT USE OF INSULIN (HCC): ICD-10-CM

## 2018-10-11 DIAGNOSIS — I10 ESSENTIAL HYPERTENSION: ICD-10-CM

## 2018-10-11 DIAGNOSIS — Z91.89 AT RISK FOR SEXUALLY TRANSMITTED DISEASE DUE TO UNPROTECTED SEX: ICD-10-CM

## 2018-10-11 DIAGNOSIS — Z00.00 ANNUAL PHYSICAL EXAM: Primary | ICD-10-CM

## 2018-10-11 DIAGNOSIS — Z79.4 TYPE 2 DIABETES MELLITUS WITH HYPERGLYCEMIA, WITH LONG-TERM CURRENT USE OF INSULIN (HCC): ICD-10-CM

## 2018-10-11 DIAGNOSIS — J45.20 MILD INTERMITTENT ASTHMA, UNSPECIFIED WHETHER COMPLICATED: ICD-10-CM

## 2018-10-11 DIAGNOSIS — Z23 NEED FOR INFLUENZA VACCINATION: ICD-10-CM

## 2018-10-11 DIAGNOSIS — E11.65 TYPE 2 DIABETES MELLITUS WITH HYPERGLYCEMIA, WITHOUT LONG-TERM CURRENT USE OF INSULIN (HCC): ICD-10-CM

## 2018-10-11 LAB
ALBUMIN SERPL BCP-MCNC: 3.8 G/DL (ref 3.5–5)
ALP SERPL-CCNC: 78 U/L (ref 46–116)
ALT SERPL W P-5'-P-CCNC: 46 U/L (ref 12–78)
ANION GAP SERPL CALCULATED.3IONS-SCNC: 5 MMOL/L (ref 4–13)
AST SERPL W P-5'-P-CCNC: 21 U/L (ref 5–45)
BASOPHILS # BLD AUTO: 0.03 THOUSANDS/ΜL (ref 0–0.1)
BASOPHILS NFR BLD AUTO: 1 % (ref 0–1)
BILIRUB SERPL-MCNC: 0.75 MG/DL (ref 0.2–1)
BUN SERPL-MCNC: 14 MG/DL (ref 5–25)
CALCIUM SERPL-MCNC: 8.8 MG/DL (ref 8.3–10.1)
CHLAMYDIA DNA CVX QL NAA+PROBE: NORMAL
CHLORIDE SERPL-SCNC: 108 MMOL/L (ref 100–108)
CHOLEST SERPL-MCNC: 146 MG/DL (ref 50–200)
CO2 SERPL-SCNC: 25 MMOL/L (ref 21–32)
CREAT SERPL-MCNC: 1.16 MG/DL (ref 0.6–1.3)
CREAT UR-MCNC: 185 MG/DL
EOSINOPHIL # BLD AUTO: 0.08 THOUSAND/ΜL (ref 0–0.61)
EOSINOPHIL NFR BLD AUTO: 1 % (ref 0–6)
ERYTHROCYTE [DISTWIDTH] IN BLOOD BY AUTOMATED COUNT: 13.3 % (ref 11.6–15.1)
EST. AVERAGE GLUCOSE BLD GHB EST-MCNC: 108 MG/DL
GFR SERPL CREATININE-BSD FRML MDRD: 92 ML/MIN/1.73SQ M
GLUCOSE P FAST SERPL-MCNC: 83 MG/DL (ref 65–99)
HBA1C MFR BLD: 5.4 % (ref 4.2–6.3)
HCT VFR BLD AUTO: 40.2 % (ref 36.5–49.3)
HDLC SERPL-MCNC: 29 MG/DL (ref 40–60)
HGB BLD-MCNC: 13.7 G/DL (ref 12–17)
IMM GRANULOCYTES # BLD AUTO: 0.01 THOUSAND/UL (ref 0–0.2)
IMM GRANULOCYTES NFR BLD AUTO: 0 % (ref 0–2)
LDLC SERPL CALC-MCNC: 88 MG/DL (ref 0–100)
LYMPHOCYTES # BLD AUTO: 1.84 THOUSANDS/ΜL (ref 0.6–4.47)
LYMPHOCYTES NFR BLD AUTO: 31 % (ref 14–44)
MCH RBC QN AUTO: 27.2 PG (ref 26.8–34.3)
MCHC RBC AUTO-ENTMCNC: 34.1 G/DL (ref 31.4–37.4)
MCV RBC AUTO: 80 FL (ref 82–98)
MICROALBUMIN UR-MCNC: 9.3 MG/L (ref 0–20)
MICROALBUMIN/CREAT 24H UR: 5 MG/G CREATININE (ref 0–30)
MONOCYTES # BLD AUTO: 0.53 THOUSAND/ΜL (ref 0.17–1.22)
MONOCYTES NFR BLD AUTO: 9 % (ref 4–12)
N GONORRHOEA DNA GENITAL QL NAA+PROBE: NORMAL
NEUTROPHILS # BLD AUTO: 3.51 THOUSANDS/ΜL (ref 1.85–7.62)
NEUTS SEG NFR BLD AUTO: 58 % (ref 43–75)
NRBC BLD AUTO-RTO: 0 /100 WBCS
PLATELET # BLD AUTO: 282 THOUSANDS/UL (ref 149–390)
PMV BLD AUTO: 11.3 FL (ref 8.9–12.7)
POTASSIUM SERPL-SCNC: 3.6 MMOL/L (ref 3.5–5.3)
PROT SERPL-MCNC: 7 G/DL (ref 6.4–8.2)
RBC # BLD AUTO: 5.04 MILLION/UL (ref 3.88–5.62)
SODIUM SERPL-SCNC: 138 MMOL/L (ref 136–145)
TRIGL SERPL-MCNC: 146 MG/DL
WBC # BLD AUTO: 6 THOUSAND/UL (ref 4.31–10.16)

## 2018-10-11 PROCEDURE — 99395 PREV VISIT EST AGE 18-39: CPT | Performed by: INTERNAL MEDICINE

## 2018-10-11 PROCEDURE — 87529 HSV DNA AMP PROBE: CPT

## 2018-10-11 PROCEDURE — 99406 BEHAV CHNG SMOKING 3-10 MIN: CPT | Performed by: INTERNAL MEDICINE

## 2018-10-11 PROCEDURE — 83036 HEMOGLOBIN GLYCOSYLATED A1C: CPT

## 2018-10-11 PROCEDURE — 87491 CHLMYD TRACH DNA AMP PROBE: CPT

## 2018-10-11 PROCEDURE — 82570 ASSAY OF URINE CREATININE: CPT | Performed by: INTERNAL MEDICINE

## 2018-10-11 PROCEDURE — 87389 HIV-1 AG W/HIV-1&-2 AB AG IA: CPT

## 2018-10-11 PROCEDURE — 86592 SYPHILIS TEST NON-TREP QUAL: CPT

## 2018-10-11 PROCEDURE — 85025 COMPLETE CBC W/AUTO DIFF WBC: CPT

## 2018-10-11 PROCEDURE — 80061 LIPID PANEL: CPT

## 2018-10-11 PROCEDURE — 87591 N.GONORRHOEAE DNA AMP PROB: CPT

## 2018-10-11 PROCEDURE — 80053 COMPREHEN METABOLIC PANEL: CPT

## 2018-10-11 PROCEDURE — 36415 COLL VENOUS BLD VENIPUNCTURE: CPT

## 2018-10-11 PROCEDURE — 3061F NEG MICROALBUMINURIA REV: CPT | Performed by: NURSE PRACTITIONER

## 2018-10-11 PROCEDURE — 82043 UR ALBUMIN QUANTITATIVE: CPT | Performed by: INTERNAL MEDICINE

## 2018-10-11 PROCEDURE — 99214 OFFICE O/P EST MOD 30 MIN: CPT | Performed by: INTERNAL MEDICINE

## 2018-10-11 NOTE — PATIENT INSTRUCTIONS
Meal Planning with Diabetes Exchanges   AMBULATORY CARE:   Diabetes exchanges  are servings of food that contain similar amounts of carbohydrate, fat, protein, and calories within a food group  The exchanges can be used to develop a healthy meal plan that helps to keep your blood sugar within the recommended levels  A meal plan with the right amount of carbohydrates is especially important  Your blood sugar naturally rises after you eat carbohydrates  Too many carbohydrates in 1 meal or snack can raise your blood sugar level  Carbohydrates are found in starches, fruit, milk, yogurt, and sweets  How to create a meal plan with exchanges:  A dietitian will work with you to develop a healthy meal plan that is right for you  This meal plan will include the amount of exchanges you can have from each food group throughout the day  Follow your meal plan by keeping track of the amount of exchanges you eat for each meal and snack  Your meal plan will be based on your age, weight, blood sugar levels, medicine, and activity level  Starch food group exchanges:  Each exchange below contains about 15 grams of carbohydrate , 3 grams of protein, 1 gram of fat, and 80 calories  · 1 ounce of white, whole wheat or rye bread (1 slice)    · 1 ounce of bagel (about ¼ of a bagel)    · 1 6-inch flour or corn tortilla or 1 4-inch pancake (about ¼ inch thick)    · ? cup of cooked pasta or rice    · ¾ cup of dry, ready-to-eat cereal with no sugar added     · ½ cup of cooked cereal, such as oatmeal    · 3 von cracker squares or 8 animal crackers    · 6 saltine-type crackers or     · 3 cups of popcorn or ¾ ounce of pretzels     · Starchy vegetables and cooked legumes:      ¨ ½ cup of corn, green peas, sweet potatoes, or mashed potatoes     ¨ ¼ of a large baked potato     ¨ 1 cup of acorn, butternut squash, or pumpkin     ¨ ½ cup of beans, lentils, or peas (such as castaneda, kidney, or black-eyed)    ¨ ?  cup of lima beans  Fruit group exchanges:  Each exchange contains about 15 grams of carbohydrate  and 60 calories  · 1 small (4 ounce) apple, banana orange, or nectarine    · ½ cup of canned or fresh fruit    · ½ cup (4 ounces) of unsweetened fruit juice    · 2 tablespoons of dried fruit  Milk group exchanges:  Each exchange contains about 12 grams of carbohydrate  and 8 grams of protein  The amount of fat and calories in each serving depends on the type of milk (such as whole, low-fat, or fat-free)  · 1 cup fat-free or low-fat milk    · ¾ cup of plain, nonfat yogurt    · 1 cup fat-free, flavored yogurt with artificial (no calorie) sweetener  Non-starchy vegetable group exchanges:  Each exchange contains about 5 grams of carbohydrate , 2 grams of protein, and 25 calories  Examples include beets, broccoli, cabbage, carrots, cauliflower, cucumber, mushrooms, tomatoes, and zucchini  · ½ cup of cooked vegetables or 1 cup of raw vegetables     · ½ cup of vegetable juice  Meat and meat substitute group exchanges:  Each exchange of a lean meat  listed below contains about 7 grams of protein, 0 to 3 grams of fat, and 45 calories  The meat and meat substitutes food group does not contain any carbohydrates  Medium and high-fat meats have more calories  · 1 ounce of chicken or turkey without skin, or 1 ounce of fish (not breaded or fried)     · 1 ounce of lean beef, pork, or lamb     · 1-inch cube or 1 ounce of low-fat cheese     · 2 egg whites or ¼ cup of egg substitute     · ½ cup of tofu  Sweets, desserts, and other carbohydrate group exchanges:   · Sweets and other desserts:  Each exchange has about 15 grams of carbohydrate       ¨ 1 ounce of barry food cake or 2-inch square cake (unfrosted)    ¨ 2 small cookies     ¨ ½ cup of sugar-free, fat-free ice cream    ¨ 1 tablespoon of syrup, jam, jelly, table sugar, or honey    · Combination foods:     ¨ 1 cup of an entrée, such as lasagna, spaghetti with meatballs, macaroni and cheese, and chili with beans (each serving counts as 2 carbohydrate exchanges )     ¨ 1 cup of tomato or vegetable beef soup (each serving counts as 1 carbohydrate exchange )  Fat group exchanges:  Each exchange contains 5 grams of fat and 45 calories  · 1 teaspoon of oil (such as canola, olive, or corn oil)     · 6 almonds or cashews, 10 peanuts, or 4 pecan halves     · 2 tablespoons of avocado     · ½ tablespoon of peanut butter     · 1 teaspoon of regular margarine or 2 teaspoons of low-fat margarine     · 1 teaspoon of regular butter or 1 tablespoon of low-fat butter     · 1 teaspoon of regular mayonnaise or 1 tablespoon of low-fat mayonnaise     · 1 tablespoon of regular salad dressing or 2 tablespoons of low-fat salad dressing  Free foods: The foods on this list are called free foods because they have very few calories  Free foods usually do not increase your blood sugar if you limit them  · 1 tablespoon of catsup or taco sauce     · ¼ cup of salsa     · 2 tablespoons of sugar-free syrup or 2 teaspoons of light jam or jelly     · 1 tablespoon of fat-free salad dressing     · 4 tablespoons of fat-free margarine or fat-free mayonnaise     · Sugar-free drinks: diet soda, sugar-free drink mixes, or mineral water     · Low-sodium bouillon or fat-free broth     · Mustard     · Seasonings such as spices, herbs, and garlic     · Sugar-free gelatin without added fruit  Other healthy nutrition guidelines:   · Eat more fiber  Choose foods that are good sources of fiber, such as fruits, vegetables, and whole grains  Cereals that contain 5 or more grams of fiber per serving are good sources of fiber  Legumes such as garbanzo, castandea beans, kidney beans, and lentils are also good sources  · Limit fat  Ask your dietitian or healthcare provider how much fat you should eat each day  Choose foods low in fat, saturated fat, trans fat, and cholesterol  Examples include turkey or chicken without the skin, fish, lean cuts of meat, and beans   Low-fat dairy foods, such as low-fat or fat-free milk and low-fat yogurt are also good choices  Omega-3 fatty acids are healthy fats that are found in canola oil, soybean oil and fatty fish  Cameron, albacore tuna, and sardines are good sources of omega 3 fatty acids  Eat 2 servings of these types of fish each week  Do not eat fried fish  · Limit sugar  Sugar and sweets must be counted toward the carbohydrate exchanges that you can have within your meal plan  Limit sugar and sweets because they are usually also high in calories and fat  Eat smaller portions of sweets by sharing a dessert or asking for a child-size portion at a restaurant  · Limit sodium  (salt) to about 2,300 mg per day  You may need to eat even less sodium if you have certain medical conditions  Foods high in sodium include soy sauce, potato chips, and soup  · Limit alcohol  Ask your healthcare provider if it is safe for you to drink alcohol  If alcohol is safe for you to have, eat a meal when you drink alcohol  If you drink alcohol on an empty stomach, your blood sugar may drop to a low level  Women should limit alcohol to 1 drink per day  Men should limit alcohol to 2 drinks per day  A drink of alcohol is 5 ounces of wine, 12 ounces of beer, or 1½ ounces of liquor  Other ways to manage your diabetes:   · Control your blood sugar level  Test your blood sugar level regularly and keep a record of the results  Ask your healthcare provider when and how often to test your blood sugar  You may need to check your blood sugar level at least 3 times each day  · Talk to your healthcare provider about your weight  Ask if you need to lose weight, and how much you need to lose  If you are overweight, you may need to make other changes to lose weight  Ask your healthcare provider to help you create a weight loss program      · Exercise  can help to control your blood sugar levels and decrease your risk of heart disease   It can also help you lose or maintain your weight  Get at least 30 minutes of exercise, 5 times each week  Do resistance training (using weights) 2 times each week  Do not sit for longer than 90 minutes  Work with your healthcare provider to plan the best exercise program for you  Contact your healthcare provider if:   · You have high blood sugar levels during a certain time of day, or almost all of the time  · You often have low blood sugar levels  · You have questions or concerns about your condition or care  © 2017 [employer]  Information is for End User's use only and may not be sold, redistributed or otherwise used for commercial purposes  All illustrations and images included in CareNotes® are the copyrighted property of [employer]  or [employer]  The above information is an  only  It is not intended as medical advice for individual conditions or treatments  Talk to your doctor, nurse or pharmacist before following any medical regimen to see if it is safe and effective for you  Wellness Visit for Adults   AMBULATORY CARE:   A wellness visit  is when you see your healthcare provider to get screened for health problems  You can also get advice on how to stay healthy  Write down your questions so you remember to ask them  Ask your healthcare provider how often you should have a wellness visit  What happens at a wellness visit:  Your healthcare provider will ask about your health, and your family history of health problems  This includes high blood pressure, heart disease, and cancer  He or she will ask if you have symptoms that concern you, if you smoke, and about your mood  You may also be asked about your intake of medicines, supplements, food, and alcohol  Any of the following may be done:  · Your weight  will be checked  Your height may also be checked so your body mass index (BMI) can be calculated  Your BMI shows if you are at a healthy weight       · Your blood pressure  and heart rate will be checked  Your temperature may also be checked  · Blood and urine tests  may be done  Blood tests may be done to check your cholesterol levels  Abnormal cholesterol levels increase your risk for heart disease and stroke  You may also need a blood or urine test to check for diabetes if you are at increased risk  Urine tests may be done to look for signs of an infection or kidney disease  · A physical exam  includes checking your heartbeat and lungs with a stethoscope  Your healthcare provider may also check your skin to look for sun damage  · Screening tests  may be recommended  A screening test is done to check for diseases that may not cause symptoms  The screening tests you may need depend on your age, gender, family history, and lifestyle habits  For example, colorectal screening may be recommended if you are 48years old or older  Screening tests you need if you are a woman:   · A Pap smear  is used to screen for cervical cancer  Pap smears are usually done every 3 to 5 years depending on your age  You may need them more often if you have had abnormal Pap smear test results in the past  Ask your healthcare provider how often you should have a Pap smear  · A mammogram  is an x-ray of your breasts to screen for breast cancer  Experts recommend mammograms every 2 years starting at age 48 years  You may need a mammogram at age 52 years or younger if you have an increased risk for breast cancer  Talk to your healthcare provider about when you should start having mammograms and how often you need them  Vaccines you may need:   · Get an influenza vaccine  every year  The influenza vaccine protects you from the flu  Several types of viruses cause the flu  The viruses change over time, so new vaccines are made each year  · Get a tetanus-diphtheria (Td) booster vaccine  every 10 years  This vaccine protects you against tetanus and diphtheria   Tetanus is a severe infection that may cause painful muscle spasms and lockjaw  Diphtheria is a severe bacterial infection that causes a thick covering in the back of your mouth and throat  · Get a human papillomavirus (HPV) vaccine  if you are female and aged 23 to 32 or male 23 to 24 and never received it  This vaccine protects you from HPV infection  HPV is the most common infection spread by sexual contact  HPV may also cause vaginal, penile, and anal cancers  · Get a pneumococcal vaccine  if you are aged 72 years or older  The pneumococcal vaccine is an injection given to protect you from pneumococcal disease  Pneumococcal disease is an infection caused by pneumococcal bacteria  The infection may cause pneumonia, meningitis, or an ear infection  · Get a shingles vaccine  if you are aged 61 or older, even if you have had shingles before  The shingles vaccine is an injection to protect you from the varicella-zoster virus  This is the same virus that causes chickenpox  Shingles is a painful rash that develops in people who had chickenpox or have been exposed to the virus  How to eat healthy:  My Plate is a model for planning healthy meals  It shows the types and amounts of foods that should go on your plate  Fruits and vegetables make up about half of your plate, and grains and protein make up the other half  A serving of dairy is included on the side of your plate  The amount of calories and serving sizes you need depends on your age, gender, weight, and height  Examples of healthy foods are listed below:  · Eat a variety of vegetables  such as dark green, red, and orange vegetables  You can also include canned vegetables low in sodium (salt) and frozen vegetables without added butter or sauces  · Eat a variety of fresh fruits , canned fruit in 100% juice, frozen fruit, and dried fruit  · Include whole grains  At least half of the grains you eat should be whole grains   Examples include whole-wheat bread, wheat pasta, brown rice, and whole-grain cereals such as oatmeal     · Eat a variety of protein foods such as seafood (fish and shellfish), lean meat, and poultry without skin (turkey and chicken)  Examples of lean meats include pork leg, shoulder, or tenderloin, and beef round, sirloin, tenderloin, and extra lean ground beef  Other protein foods include eggs and egg substitutes, beans, peas, soy products, nuts, and seeds  · Choose low-fat dairy products such as skim or 1% milk or low-fat yogurt, cheese, and cottage cheese  · Limit unhealthy fats  such as butter, hard margarine, and shortening  Exercise:  Exercise at least 30 minutes per day on most days of the week  Some examples of exercise include walking, biking, dancing, and swimming  You can also fit in more physical activity by taking the stairs instead of the elevator or parking farther away from stores  Include muscle strengthening activities 2 days each week  Regular exercise provides many health benefits  It helps you manage your weight, and decreases your risk for type 2 diabetes, heart disease, stroke, and high blood pressure  Exercise can also help improve your mood  Ask your healthcare provider about the best exercise plan for you  General health and safety guidelines:   · Do not smoke  Nicotine and other chemicals in cigarettes and cigars can cause lung damage  Ask your healthcare provider for information if you currently smoke and need help to quit  E-cigarettes or smokeless tobacco still contain nicotine  Talk to your healthcare provider before you use these products  · Limit alcohol  A drink of alcohol is 12 ounces of beer, 5 ounces of wine, or 1½ ounces of liquor  · Lose weight, if needed  Being overweight increases your risk of certain health conditions  These include heart disease, high blood pressure, type 2 diabetes, and certain types of cancer  · Protect your skin  Do not sunbathe or use tanning beds  Use sunscreen with a SPF 15 or higher  Apply sunscreen at least 15 minutes before you go outside  Reapply sunscreen every 2 hours  Wear protective clothing, hats, and sunglasses when you are outside  · Drive safely  Always wear your seatbelt  Make sure everyone in your car wears a seatbelt  A seatbelt can save your life if you are in an accident  Do not use your cell phone when you are driving  This could distract you and cause an accident  Pull over if you need to make a call or send a text message  · Practice safe sex  Use latex condoms if are sexually active and have more than one partner  Your healthcare provider may recommend screening tests for sexually transmitted infections (STIs)  · Wear helmets, lifejackets, and protective gear  Always wear a helmet when you ride a bike or motorcycle, go skiing, or play sports that could cause a head injury  Wear protective equipment when you play sports  Wear a lifejacket when you are on a boat or doing water sports  © 2017 [facility] Information is for End User's use only and may not be sold, redistributed or otherwise used for commercial purposes  All illustrations and images included in CareNotes® are the copyrighted property of [employer]  or [employer]  The above information is an  only  It is not intended as medical advice for individual conditions or treatments  Talk to your doctor, nurse or pharmacist before following any medical regimen to see if it is safe and effective for you

## 2018-10-11 NOTE — PROGRESS NOTES
Assessment/Plan:    Annual physical exam  - history and physical examination done  - patient is clinically stable  - will order CBC, CMP, TSH, lipid panel    Essential hypertension  - blood pressure today is mildly 136/88 but patient states that he was in a hurry and over excited this morning  - will continue to monitor his blood pressure  - Continue with amlodipine    Diabetes mellitus type 2  - last hemoglobin A1c done on June 25th, 2018 was 6 2  - continue with insulin glargine  - will recheck his hemoglobin A1c    Asthma  - mild and intermittent  - well controlled  - continue with albuterol inhaler as needed    STD exposure  - will check GC/Chlamydia in urine, RPR, HIV Ag/Ab and HSV     Nicotine dependence  - patient has been counseled to quit  - I spent about 3 min on smoking cessation counseling       Diagnoses and all orders for this visit:    Annual physical exam  -     CBC and differential; Future  -     Comprehensive metabolic panel; Future    Need for influenza vaccination  -     SYRINGE/SINGLE-DOSE VIAL: influenza vaccine, [id number], quadrivalent, 0 5 mL, preservative-free, for patients 3+ yr (FLUZONE)    STD exposure  -     Chlamydia/GC amplified DNA by PCR; Future  -     RPR; Future  -     HIV 1/2 AG-AB combo; Future  -     HSV 1/2 IgM and Type Specific IgG; Future    At risk for sexually transmitted disease due to unprotected sex  -     Chlamydia/GC amplified DNA by PCR; Future  -     RPR; Future  -     HIV 1/2 AG-AB combo; Future  -     HSV 1/2 IgM and Type Specific IgG; Future    Essential hypertension    Mild intermittent asthma, unspecified whether complicated    Type 2 diabetes mellitus with hyperglycemia, with long-term current use of insulin (HCC)    Cigarette nicotine dependence without complication          Subjective:      Patient ID: Delaney Marie is a 45 y o  male  HPI  Pt is here for a physical exam in order to get his 's license back  He recently got out of [employer]   He states that he had his last physical 1 year ago  He has a history of diabetes, hypertension, asthma and GERD  Pt states that he checks his blood sugar about 3 times a day and gets values around 80-90 fasting and in the 100s after meals  He states that his asthma is very well controlled and he sometimes goes for a month without needing to use his albuterol inhaler  Pt states that he is trying to eat a healthy diet and stick to his diabetic diet  He also tries to get sufficient exercise  He drinks alcohol socially and smokes about one pack of cigarettes per week and has been smoking for over 10 years  He also smokes marijuana but denies using other recreational drugs  Pt states that he had his last Tdap last year and wants to get the flu shot today  He is sexually active and has had 3 female partners in the past year  He states that he always wears condoms  He admits to a family history of hypertension and diabetes in both his parents and asthma in his mother and history of stroke in his grandmother  He said he had unprotected sex 3 days ago(because the condom broke) and would like to be checked for STDs  He denies any genital rashes, dysuria, penile discharge, glandular swelling, fever, chills, night sweats, nausea, vomiting, abdominal pain, diarrhea, constipation, cough, SOB, chest pain, headache, arthralgias or myalgias  The following portions of the patient's history were reviewed and updated as appropriate: allergies, current medications, past family history, past medical history, past social history, past surgical history and problem list     Review of Systems   Constitutional: Negative for activity change, chills, fatigue, fever and unexpected weight change  HENT: Negative for ear pain, postnasal drip, rhinorrhea, sinus pressure and sore throat  Eyes: Negative for pain  Respiratory: Negative for cough, choking, chest tightness, shortness of breath and wheezing      Cardiovascular: Negative for chest pain, palpitations and leg swelling  Gastrointestinal: Negative for abdominal pain, constipation, diarrhea, nausea and vomiting  Endocrine: Positive for polydipsia, polyphagia and polyuria  Genitourinary: Negative for dysuria and hematuria  Musculoskeletal: Negative for arthralgias, back pain, gait problem, joint swelling, myalgias and neck stiffness  Skin: Negative for pallor and rash  Neurological: Negative for dizziness, tremors, seizures, syncope, light-headedness and headaches  Hematological: Negative for adenopathy  Psychiatric/Behavioral: Negative for behavioral problems  Past Medical History:   Diagnosis Date    Adult situational stress disorder     Asthma     Diabetes mellitus ([facility] [address] )     GERD (gastroesophageal reflux disease)     Hypertension          Current Outpatient Prescriptions:     amLODIPine (NORVASC) 5 mg tablet, Take 1 tablet (5 mg total) by mouth daily, Disp: 30 tablet, Rfl: 3    B-D UF III MINI PEN NEEDLES 31G X 5 MM MISC, 1 each 3 (three) times a day, Disp: , Rfl: 3    FREESTYLE LITE test strip, 1 each 3 (three) times a day Check blood sugar, Disp: , Rfl: 5    insulin glargine (BASAGLAR KWIKPEN) 100 units/mL injection pen, Inject 30 Units of insulin subcutaneously at bedtime   Dispense 4 pens for one month supply per pharm, Disp: 5 pen, Rfl: 2    pantoprazole (PROTONIX) 40 mg tablet, Take 1 tablet (40 mg total) by mouth daily, Disp: 30 tablet, Rfl: 3    Allergies   Allergen Reactions    Dust Mite Extract Other (See Comments) and Nasal Congestion     Itchy eyes    Pollen Extract Other (See Comments) and Nasal Congestion     Itchy eyes       Social History   Past Surgical History:   Procedure Laterality Date    NO PAST SURGERIES       Family History   Problem Relation Age of Onset    Asthma Mother     Diabetes Mother     Hypertension Mother     Diabetes Father     Hypertension Father        Objective:  /88 (BP Location: Left arm, Patient Position: Sitting, Cuff Size: Large)   Pulse 88   Temp 98 2 °F (36 8 °C) (Tympanic)   Ht 5' 9 53" (1 766 m)   Wt 129 kg (283 lb 12 8 oz)   SpO2 99%   BMI 41 28 kg/m²        Physical Exam   Constitutional: He is oriented to person, place, and time  He appears well-developed and well-nourished  No distress  Morbidly obese   HENT:   Head: Normocephalic and atraumatic  Right Ear: External ear normal    Left Ear: External ear normal    Nose: Nose normal    Mouth/Throat: No oropharyngeal exudate  Dry mucous membranes   Eyes: Pupils are equal, round, and reactive to light  Conjunctivae and EOM are normal  Right eye exhibits no discharge  Left eye exhibits no discharge  No scleral icterus  Neck: Normal range of motion  Neck supple  No JVD present  No tracheal deviation present  No thyromegaly present  Cardiovascular: Normal rate, regular rhythm, normal heart sounds and intact distal pulses  Exam reveals no gallop and no friction rub  No murmur heard  Pulmonary/Chest: Effort normal and breath sounds normal  No respiratory distress  He has no wheezes  He has no rales  He exhibits no tenderness  Abdominal: Soft  Bowel sounds are normal  He exhibits no distension and no mass  There is no tenderness  There is no rebound and no guarding  Musculoskeletal: Normal range of motion  He exhibits no edema, tenderness or deformity  Lymphadenopathy:     He has cervical adenopathy  Neurological: He is alert and oriented to person, place, and time  He has normal reflexes  No cranial nerve deficit  He exhibits normal muscle tone  Coordination normal    Skin: Skin is warm and dry  No rash noted  He is not diaphoretic  No erythema  No pallor  Psychiatric: He has a normal mood and affect   His behavior is normal

## 2018-10-12 LAB
HIV 1+2 AB+HIV1 P24 AG SERPL QL IA: NORMAL
RPR SER QL: NORMAL

## 2018-10-14 LAB
HSV1 DNA SPEC QL NAA+PROBE: NEGATIVE
HSV2 DNA SPEC QL NAA+PROBE: NEGATIVE

## 2018-11-16 ENCOUNTER — OFFICE VISIT (OUTPATIENT)
Dept: INTERNAL MEDICINE CLINIC | Age: 38
End: 2018-11-16
Payer: COMMERCIAL

## 2018-11-16 VITALS
BODY MASS INDEX: 41.26 KG/M2 | HEART RATE: 90 BPM | HEIGHT: 69 IN | DIASTOLIC BLOOD PRESSURE: 88 MMHG | TEMPERATURE: 97.6 F | SYSTOLIC BLOOD PRESSURE: 138 MMHG | WEIGHT: 278.6 LBS | OXYGEN SATURATION: 98 %

## 2018-11-16 DIAGNOSIS — E11.65 TYPE 2 DIABETES MELLITUS WITH HYPERGLYCEMIA, WITHOUT LONG-TERM CURRENT USE OF INSULIN (HCC): ICD-10-CM

## 2018-11-16 DIAGNOSIS — N17.9 AKI (ACUTE KIDNEY INJURY) (HCC): ICD-10-CM

## 2018-11-16 DIAGNOSIS — K21.9 GASTROESOPHAGEAL REFLUX DISEASE, ESOPHAGITIS PRESENCE NOT SPECIFIED: Primary | ICD-10-CM

## 2018-11-16 DIAGNOSIS — I10 ESSENTIAL HYPERTENSION: ICD-10-CM

## 2018-11-16 PROBLEM — Z79.4 TYPE 2 DIABETES MELLITUS WITHOUT COMPLICATION, WITH LONG-TERM CURRENT USE OF INSULIN (HCC): Status: ACTIVE | Noted: 2018-03-02

## 2018-11-16 PROBLEM — E11.9 TYPE 2 DIABETES MELLITUS WITHOUT COMPLICATION, WITH LONG-TERM CURRENT USE OF INSULIN (HCC): Status: ACTIVE | Noted: 2018-03-02

## 2018-11-16 PROCEDURE — 99214 OFFICE O/P EST MOD 30 MIN: CPT | Performed by: NURSE PRACTITIONER

## 2018-11-16 RX ORDER — LISINOPRIL 5 MG/1
5 TABLET ORAL DAILY
Qty: 30 TABLET | Refills: 5 | Status: SHIPPED | OUTPATIENT
Start: 2018-11-16 | End: 2019-06-17

## 2018-11-16 NOTE — PATIENT INSTRUCTIONS
Cut back to 15 units of insulin before bed  Keep track of BS and call in 3 weeks to discuss with me  Start lisinopril 5mg daily   Go for labs before appt in 3-4 months

## 2018-11-16 NOTE — PROGRESS NOTES
Assessment/Plan:     Diagnoses and all orders for this visit:    Gastroesophageal reflux disease, esophagitis presence not specified  -     Ambulatory referral to Gastroenterology; Future  -     Continue protonix 40mg daily  Symptoms still not controlled  Refer for EGD    Type 2 diabetes mellitus with hyperglycemia, without long-term current use of insulin (HCC)  -     insulin glargine (BASAGLAR KWIKPEN) 100 units/mL injection pen; Inject 15 Units of insulin subcutaneously at bedtime  Dispense 4 pens for one month supply per pharm  -     Hemoglobin A1C; Future  -     Basic metabolic panel; Future  -     Ambulatory referral to Ophthalmology; Future  -     Work to wean off of insulin  HbA1C 5 4  Will cut back from 30 unit lantus qhs to 15 x 3 weeks  Pt to call office to discuss with me in 3 weeks, will likely stop at that time and then follow his HbA1C prior to follow up in 3-4 months  Essential hypertension  -     lisinopril (ZESTRIL) 5 mg tablet; Take 1 tablet (5 mg total) by mouth daily  -     Continue amlodipine 5mg daily but add on lisinopril daily for better control and renal protection with DM2    BLAKE (acute kidney injury) ([facility] [address] )         -     Renal function stable  BLAKE resolved  Follow up 3-4 months with labs prior     Subjective:      Patient ID: Reema Vergara is a 45 y o  male  HPI    Patient presents for 2 month follow up chronic conditions and to review labs    Diabetes Mellitus  Patient presents follow up DM2  Current symptoms include: none  Symptoms have been basically asymptomatic  Patient denies foot ulcerations, hyperglycemia, hypoglycemia , nausea, polydipsia, polyuria, visual disturbances and vomiting  Evaluation to date has included: fasting blood sugar, fasting lipid panel, hemoglobin A1C and microalbuminuria  HbA1C is 5 4, previous 6 2  FBS 83  MCR normal  Home sugars: he is checking his BS in the morning and before bed  Always between 94-98   Current treatment: lantus 30units qhs  GERD  Anna presents for follow up GERD  Medical therapy in the past has included Nexium with no relief and now pt is on Protonix 40mg daily  He ran out of the Protonix  Even while taking it sometimes he would need to take 2 until it relieved his symptoms  Symptoms include burning in his chest and esophagus and occasionally causes him to vomit  Made worse by anything citrus, hot sauce or red sauce  Hypertension  Patient is here for follow-up of elevated blood pressure  He does notchecks her blood pressure at home  Blood pressure is not well controlled  Continues to be slightly elevated  Current cardiac symptoms: none  Cardiovascular risk factors: diabetes mellitus, hypertension, male gender and obesity (BMI >= 30 kg/m2)  History of target organ damage: none  He is currently taking amlodipine 5mg daily  He is compliant with medication use  BLAKE  Renal function significantly improved  Cr 1 16, GFR 92    Morbid Obesity   Lost 16 lbs in the last 2 months  He states his new job was very physically demanding  He left his job yesterday due to issues there but will [place] [employer] [place]  He has also been making dietary improving  The following portions of the patient's history were reviewed and updated as appropriate: allergies, current medications, past family history, past medical history, past social history, past surgical history and problem list     Review of Systems   Constitutional: Negative for chills and fever  Eyes: Negative for visual disturbance  Respiratory: Negative for cough, chest tightness, shortness of breath and wheezing  Cardiovascular: Negative for chest pain, palpitations and leg swelling  Neurological: Negative for dizziness, light-headedness and headaches           Past Medical History:   Diagnosis Date    Adult situational stress disorder     Asthma     [facility] mellitus ([facility] [address] [facility]     GERD (gastroesophageal reflux disease)     Hypertension          Current Outpatient Prescriptions:     amLODIPine (NORVASC) 5 mg tablet, Take 1 tablet (5 mg total) by mouth daily, Disp: 30 tablet, Rfl: 3    B-D UF III MINI PEN NEEDLES 31G X 5 MM MISC, 1 each 3 (three) times a day, Disp: , Rfl: 3    FREESTYLE LITE test strip, 1 each 3 (three) times a day Check blood sugar, Disp: , Rfl: 5    insulin glargine (BASAGLAR KWIKPEN) 100 units/mL injection pen, Inject 30 Units of insulin subcutaneously at bedtime  Dispense 4 pens for one month supply per pharm, Disp: 5 pen, Rfl: 2    pantoprazole (PROTONIX) 40 mg tablet, Take 1 tablet (40 mg total) by mouth daily, Disp: 30 tablet, Rfl: 3    Allergies   Allergen Reactions    Dust Mite Extract Other (See Comments) and Nasal Congestion     Itchy eyes    Pollen Extract Other (See Comments) and Nasal Congestion     Itchy eyes       Social History   Past Surgical History:   Procedure Laterality Date    NO PAST SURGERIES       Family History   Problem Relation Age of Onset    Asthma Mother     Diabetes Mother     Hypertension Mother     Diabetes Father     Hypertension Father        Objective:  /88 (BP Location: Left arm, Patient Position: Sitting, Cuff Size: Adult)   Pulse 90   Temp 97 6 °F (36 4 °C) (Tympanic)   Ht 5' 9 29" (1 76 m)   Wt 126 kg (278 lb 9 6 oz)   SpO2 98%   BMI 40 80 kg/m²      Physical Exam   Constitutional: He is oriented to person, place, and time  He appears well-developed and well-nourished  No distress  Morbidly obese   HENT:   Head: Normocephalic and atraumatic  Right Ear: Tympanic membrane and external ear normal    Left Ear: Tympanic membrane and external ear normal    Nose: Nose normal    Mouth/Throat: Oropharynx is clear and moist  No oropharyngeal exudate, posterior oropharyngeal edema or posterior oropharyngeal erythema  Eyes: Pupils are equal, round, and reactive to light  Conjunctivae and EOM are normal    Neck: Normal range of motion  Neck supple  Carotid bruit is not present   No thyromegaly present  Cardiovascular: Normal rate, regular rhythm and normal heart sounds  No murmur heard  Pulmonary/Chest: Effort normal and breath sounds normal  No respiratory distress  He has no decreased breath sounds  He has no wheezes  He has no rhonchi  Musculoskeletal: He exhibits no edema  Lymphadenopathy:     He has no cervical adenopathy  Neurological: He is alert and oriented to person, place, and time  Skin: Skin is warm and dry  He is not diaphoretic  Psychiatric: He has a normal mood and affect  His behavior is normal    Vitals reviewed

## 2019-02-15 ENCOUNTER — APPOINTMENT (OUTPATIENT)
Dept: LAB | Age: 39
End: 2019-02-15
Payer: COMMERCIAL

## 2019-02-15 DIAGNOSIS — E11.65 TYPE 2 DIABETES MELLITUS WITH HYPERGLYCEMIA, WITHOUT LONG-TERM CURRENT USE OF INSULIN (HCC): ICD-10-CM

## 2019-02-15 LAB
ANION GAP SERPL CALCULATED.3IONS-SCNC: 4 MMOL/L (ref 4–13)
BUN SERPL-MCNC: 12 MG/DL (ref 5–25)
CALCIUM SERPL-MCNC: 8.9 MG/DL (ref 8.3–10.1)
CHLORIDE SERPL-SCNC: 108 MMOL/L (ref 100–108)
CO2 SERPL-SCNC: 26 MMOL/L (ref 21–32)
CREAT SERPL-MCNC: 1 MG/DL (ref 0.6–1.3)
EST. AVERAGE GLUCOSE BLD GHB EST-MCNC: 103 MG/DL
GFR SERPL CREATININE-BSD FRML MDRD: 110 ML/MIN/1.73SQ M
GLUCOSE P FAST SERPL-MCNC: 93 MG/DL (ref 65–99)
HBA1C MFR BLD: 5.2 % (ref 4.2–6.3)
POTASSIUM SERPL-SCNC: 3.7 MMOL/L (ref 3.5–5.3)
SODIUM SERPL-SCNC: 138 MMOL/L (ref 136–145)

## 2019-02-15 PROCEDURE — 80048 BASIC METABOLIC PNL TOTAL CA: CPT

## 2019-02-15 PROCEDURE — 83036 HEMOGLOBIN GLYCOSYLATED A1C: CPT

## 2019-02-15 PROCEDURE — 36415 COLL VENOUS BLD VENIPUNCTURE: CPT

## 2019-02-18 ENCOUNTER — OFFICE VISIT (OUTPATIENT)
Dept: INTERNAL MEDICINE CLINIC | Age: 39
End: 2019-02-18
Payer: COMMERCIAL

## 2019-02-18 ENCOUNTER — APPOINTMENT (OUTPATIENT)
Dept: LAB | Age: 39
End: 2019-02-18
Payer: COMMERCIAL

## 2019-02-18 VITALS
TEMPERATURE: 97.5 F | DIASTOLIC BLOOD PRESSURE: 98 MMHG | SYSTOLIC BLOOD PRESSURE: 138 MMHG | BODY MASS INDEX: 39.57 KG/M2 | HEART RATE: 82 BPM | WEIGHT: 270.2 LBS | OXYGEN SATURATION: 98 %

## 2019-02-18 DIAGNOSIS — M79.10 MYALGIA: ICD-10-CM

## 2019-02-18 DIAGNOSIS — M25.50 ARTHRALGIA, UNSPECIFIED JOINT: ICD-10-CM

## 2019-02-18 DIAGNOSIS — E11.65 TYPE 2 DIABETES MELLITUS WITH HYPERGLYCEMIA, WITHOUT LONG-TERM CURRENT USE OF INSULIN (HCC): Primary | ICD-10-CM

## 2019-02-18 DIAGNOSIS — I10 ESSENTIAL HYPERTENSION: ICD-10-CM

## 2019-02-18 DIAGNOSIS — E66.9 OBESITY (BMI 30-39.9): ICD-10-CM

## 2019-02-18 DIAGNOSIS — Z20.2 POTENTIAL EXPOSURE TO STD: ICD-10-CM

## 2019-02-18 DIAGNOSIS — K21.9 GASTROESOPHAGEAL REFLUX DISEASE, ESOPHAGITIS PRESENCE NOT SPECIFIED: ICD-10-CM

## 2019-02-18 PROBLEM — Z00.00 ANNUAL PHYSICAL EXAM: Status: RESOLVED | Noted: 2018-10-11 | Resolved: 2019-02-18

## 2019-02-18 PROBLEM — Z91.89 AT RISK FOR SEXUALLY TRANSMITTED DISEASE DUE TO UNPROTECTED SEX: Status: RESOLVED | Noted: 2018-04-02 | Resolved: 2019-02-18

## 2019-02-18 PROBLEM — Z13.220 LIPID SCREENING: Status: RESOLVED | Noted: 2018-04-02 | Resolved: 2019-02-18

## 2019-02-18 PROBLEM — N17.9 AKI (ACUTE KIDNEY INJURY) (HCC): Status: RESOLVED | Noted: 2018-03-12 | Resolved: 2019-02-18

## 2019-02-18 LAB
C TRACH DNA SPEC QL NAA+PROBE: NEGATIVE
CK MB SERPL-MCNC: 3.3 NG/ML (ref 0–5)
CK MB SERPL-MCNC: <1 % (ref 0–2.5)
CK SERPL-CCNC: 483 U/L (ref 39–308)
N GONORRHOEA DNA SPEC QL NAA+PROBE: NEGATIVE

## 2019-02-18 PROCEDURE — 86592 SYPHILIS TEST NON-TREP QUAL: CPT

## 2019-02-18 PROCEDURE — 87591 N.GONORRHOEAE DNA AMP PROB: CPT

## 2019-02-18 PROCEDURE — 82553 CREATINE MB FRACTION: CPT

## 2019-02-18 PROCEDURE — 99214 OFFICE O/P EST MOD 30 MIN: CPT | Performed by: NURSE PRACTITIONER

## 2019-02-18 PROCEDURE — 87389 HIV-1 AG W/HIV-1&-2 AB AG IA: CPT

## 2019-02-18 PROCEDURE — 86618 LYME DISEASE ANTIBODY: CPT

## 2019-02-18 PROCEDURE — 87491 CHLMYD TRACH DNA AMP PROBE: CPT

## 2019-02-18 PROCEDURE — 82550 ASSAY OF CK (CPK): CPT

## 2019-02-18 PROCEDURE — 36415 COLL VENOUS BLD VENIPUNCTURE: CPT

## 2019-02-18 RX ORDER — FAMOTIDINE 40 MG/1
40 TABLET, FILM COATED ORAL DAILY
Qty: 30 TABLET | Refills: 3 | Status: SHIPPED | OUTPATIENT
Start: 2019-02-18 | End: 2019-05-31 | Stop reason: SDUPTHER

## 2019-02-18 NOTE — PATIENT INSTRUCTIONS
Stop insulin   Continue lisinopril and amlodipine for blood pressure  Stop protonix  Start pepcid as needed  Make eye appointment     follow up in 4 months

## 2019-02-18 NOTE — PROGRESS NOTES
Assessment/Plan:     Diagnoses and all orders for this visit:    Type 2 diabetes mellitus with hyperglycemia, without long-term current use of insulin [facility]  -     Ambulatory referral to Ophthalmology; Future  -     Hemoglobin A1C; Future  -     Basic metabolic panel; Future  -     HbA1C 5 2  Discontinue insulin  Advise pt to continue monitor BS at home  Will recheck HbA1C and bmp in 4 months  Continue healthy diet and exercise  Gastroesophageal reflux disease, esophagitis presence not specified  -     famotidine (PEPCID) 40 MG tablet; Take 1 tablet (40 mg total) by mouth daily        -     Discontinue PPI, start H2 blocker as needed  Essential hypertension         -     Educated patient on importance of compliance with amlodipine and lisinopril  Explained pathology and risk of uncontrolled HTN  Patient is agreeable to be compliant with these medications  Potential exposure to STD  -     RPR; Future  -     HIV 1/2 AG-AB combo; Future  -     Chlamydia/GC amplified DNA by PCR; Future    Arthralgia, unspecified joint  -     Lyme Antibody Profile with reflex to WB; Future    Myalgia  -     CK (with reflex to MB); Future    Obesity (BMI 30-39 9)         -    Patient continuing with weight loss through improved healthier diet and more active lifestyle  Other orders  -     Cancel: insulin glargine (BASAGLAR KWIKPEN) 100 units/mL injection pen; Inject 15 Units of insulin subcutaneously at bedtime  Dispense 4 pens for one month supply per pharm    Follow up in 4 months with HbA1c and BMP prior to visit       Subjective:      Patient ID: Lani Baez is a 45 y o  male  HPI    Hypertension  Patient is here for follow-up of elevated blood pressure  He is exercising  He does not check his blood pressure at home  Blood pressure is not well controlled  Current cardiac symptoms: none   Patient denies chest pain, chest pressure/discomfort, dyspnea, exertional chest pressure/discomfort, fatigue, irregular heart beat and lower extremity edema  Cardiovascular risk factors: diabetes mellitus, hypertension, obesity (BMI >= 30 kg/m2) and smoking/ tobacco exposure  History of target organ damage: none  He is currently taking amlodipine 5mg and lisinopril 5mg daily  He is not compliant with medication use  Has not been taking his BP medications stating he really doesn't think he needs it  Diabetes Mellitus  Patient presents for type 2 diabetes follow up  Current symptoms include: weight loss  Symptoms have been basically asymptomatic  Patient denies foot ulcerations, hyperglycemia, hypoglycemia , increased appetite, nausea, paresthesia of the feet, polydipsia, polyuria, visual disturbances and vomiting  Evaluation to date has included: fasting blood sugar and hemoglobin A1C  Home sugars: 85 on average  Current treatment: lantus 15 units  Last dilated eye exam: about 1 year ago  Pt was dx with Dm2 in Feb 2018 with a HbA1c of 9 6  His current HbA1C is 5 2 and fasting BS was 93 on most recent results  His renal function is stable and improved with a Cr of 1 00 and GFR of 110  GERD  Patient is experiencing intermittent symptoms  Symptoms are predictable depending on what he is eating or drinking  Symptoms are made worse by spicy or acidic foods  He is using his Protonix only as needed  Muscle aches  He complains of muscle aches in his legs and back  No acute injury or trauma  Pain is intermittent  He thinks it may be from not working out for a while  He is going to start playing football again  He does not do any heavy lifting at his job  Exposure to STD  Patient reports being sexually active this past weekend  He got a text from his partner stating that she might have chlamydia  He would like full panel of testing  PTSD  Patient is now following with another provider for management of medical [place]ajuana       The following portions of the patient's history were reviewed and updated as appropriate: allergies, current medications, past family history, past medical history, past social history, past surgical history and problem list     Review of Systems   Constitutional: Negative for activity change, appetite change, chills and fever  HENT: Positive for congestion  Eyes: Negative for visual disturbance  Respiratory: Positive for wheezing  Negative for cough, chest tightness and shortness of breath  Cardiovascular: Negative for chest pain, palpitations and leg swelling  Gastrointestinal: Negative for nausea and vomiting  Musculoskeletal: Positive for arthralgias and myalgias  Negative for joint swelling  Neurological: Negative for dizziness, light-headedness and headaches           Past Medical History:   Diagnosis Date    Adult situational stress disorder     Asthma     [facility] mellitus ([facility] [address] [facility]     GERD (gastroesophageal reflux disease)     Hypertension          Current Outpatient Medications:     B-D UF III MINI PEN NEEDLES 31G X 5 MM MISC, 1 each 3 (three) times a day, Disp: , Rfl: 3    FREESTYLE LITE test strip, 1 each 3 (three) times a day Check blood sugar, Disp: , Rfl: 5    pantoprazole (PROTONIX) 40 mg tablet, Take 1 tablet (40 mg total) by mouth daily, Disp: 30 tablet, Rfl: 3    amLODIPine (NORVASC) 5 mg tablet, Take 1 tablet (5 mg total) by mouth daily (Patient not taking: Reported on 2/18/2019), Disp: 30 tablet, Rfl: 3    lisinopril (ZESTRIL) 5 mg tablet, Take 1 tablet (5 mg total) by mouth daily (Patient not taking: Reported on 2/18/2019), Disp: 30 tablet, Rfl: 5    Allergies   Allergen Reactions    Dust Mite Extract Other (See Comments) and Nasal Congestion     Itchy eyes    Pollen Extract Other (See Comments) and Nasal Congestion     Itchy eyes       Social History   Past Surgical History:   Procedure Laterality Date    NO PAST SURGERIES       Family History   Problem Relation Age of Onset    Asthma Mother     Diabetes Mother     Hypertension Mother     Diabetes Father    Alley Wooten Hypertension Father        Objective:  /98 (BP Location: Left arm, Patient Position: Sitting, Cuff Size: Large)   Pulse 82   Temp 97 5 °F (36 4 °C) (Tympanic)   Wt 123 kg (270 lb 3 2 oz)   SpO2 98%   BMI 39 57 kg/m²      Physical Exam   Constitutional: He is oriented to person, place, and time  He appears well-developed and well-nourished  No distress  obese   HENT:   Head: Normocephalic and atraumatic  Eyes: Pupils are equal, round, and reactive to light  Conjunctivae and EOM are normal    Neck: Normal range of motion  Neck supple  Cardiovascular: Normal rate, regular rhythm and normal heart sounds  No murmur heard  Pulmonary/Chest: Effort normal and breath sounds normal  No respiratory distress  He has no decreased breath sounds  He has no wheezes  He has no rhonchi  Musculoskeletal: He exhibits no edema  Lymphadenopathy:     He has no cervical adenopathy  Neurological: He is alert and oriented to person, place, and time  Skin: Skin is warm and dry  He is not diaphoretic  Psychiatric: He has a normal mood and affect   His behavior is normal

## 2019-02-19 LAB
B BURGDOR IGG SER IA-ACNC: 0.17
B BURGDOR IGM SER IA-ACNC: 0.64
RPR SER QL: NORMAL

## 2019-02-20 LAB — HIV 1+2 AB+HIV1 P24 AG SERPL QL IA: NORMAL

## 2019-02-25 ENCOUNTER — TELEPHONE (OUTPATIENT)
Dept: INTERNAL MEDICINE CLINIC | Facility: CLINIC | Age: 39
End: 2019-02-25

## 2019-02-25 DIAGNOSIS — M79.10 MYALGIA: Primary | ICD-10-CM

## 2019-02-25 NOTE — TELEPHONE ENCOUNTER
Reviewed labs with patient  Advised him to drink plenty of water and to recheck the CK level in about 4-6 weeks   Pt verbalized understanding

## 2019-02-28 ENCOUNTER — HOSPITAL ENCOUNTER (EMERGENCY)
Facility: HOSPITAL | Age: 39
Discharge: HOME | End: 2019-02-28
Attending: EMERGENCY MEDICINE | Admitting: EMERGENCY MEDICINE
Payer: COMMERCIAL

## 2019-02-28 VITALS
OXYGEN SATURATION: 100 % | WEIGHT: 265 LBS | RESPIRATION RATE: 16 BRPM | HEART RATE: 83 BPM | DIASTOLIC BLOOD PRESSURE: 89 MMHG | BODY MASS INDEX: 38.81 KG/M2 | TEMPERATURE: 97.9 F | SYSTOLIC BLOOD PRESSURE: 142 MMHG

## 2019-02-28 DIAGNOSIS — K52.9 GASTROENTERITIS: Primary | ICD-10-CM

## 2019-02-28 LAB
BILIRUB UR QL STRIP: NEGATIVE
CLARITY UR: CLEAR
COLOR UR: YELLOW
COLOR, POC: YELLOW
GLUCOSE UR STRIP-MCNC: NEGATIVE MG/DL
HGB UR QL STRIP.AUTO: NEGATIVE
KETONES UR STRIP-MCNC: NEGATIVE MG/DL
LEUKOCYTE ESTERASE UR QL STRIP: NEGATIVE
NITRITE UR QL STRIP: NEGATIVE
PH UR STRIP.AUTO: 5.5 [PH] (ref 4.5–8)
PROT UR STRIP-MCNC: NEGATIVE MG/DL
SP GR UR STRIP.AUTO: 1.02 (ref 1–1.03)
UROBILINOGEN UR QL STRIP.AUTO: 0.2 E.U./DL

## 2019-02-28 PROCEDURE — 99283 EMERGENCY DEPT VISIT LOW MDM: CPT

## 2019-02-28 PROCEDURE — 81003 URINALYSIS AUTO W/O SCOPE: CPT

## 2019-02-28 RX ORDER — ONDANSETRON 4 MG/1
4 TABLET, ORALLY DISINTEGRATING ORAL ONCE
Status: COMPLETED | OUTPATIENT
Start: 2019-02-28 | End: 2019-02-28

## 2019-02-28 RX ORDER — DICYCLOMINE HCL 20 MG
20 TABLET ORAL 2 TIMES DAILY
Qty: 20 TABLET | Refills: 0 | Status: SHIPPED | OUTPATIENT
Start: 2019-02-28 | End: 2020-09-01 | Stop reason: ALTCHOICE

## 2019-02-28 RX ORDER — ONDANSETRON 4 MG/1
4 TABLET, ORALLY DISINTEGRATING ORAL EVERY 6 HOURS PRN
Qty: 20 TABLET | Refills: 0 | Status: SHIPPED | OUTPATIENT
Start: 2019-02-28 | End: 2019-06-07

## 2019-02-28 RX ORDER — DICYCLOMINE HCL 20 MG
20 TABLET ORAL ONCE
Status: COMPLETED | OUTPATIENT
Start: 2019-02-28 | End: 2019-02-28

## 2019-02-28 RX ADMIN — DICYCLOMINE HYDROCHLORIDE 20 MG: 20 TABLET ORAL at 19:38

## 2019-02-28 RX ADMIN — ONDANSETRON 4 MG: 4 TABLET, ORALLY DISINTEGRATING ORAL at 19:38

## 2019-03-01 NOTE — ED PROVIDER NOTES
History  Chief Complaint   Patient presents with    Abdominal Pain     "i think i got that stomach bug that everyone got", "throwing up like crazy i had to leave my job", pt reports NVD starting this morning, denies sick contats, pt drinking during triage     44 YO male presents with N/V/D today  States symptoms started this morning, has been constant throughout the day  Pt states he has not been able to keep anything down today  He has had multiple episodes of non-bloody, watery diarrhea  States he has intermittent, cramping, diffuse abdominal pain which seems to improve with bowel movements  Pt states there have been sick contacts at work  Pt has been trying to hydrate with difficulty today, states he had to leave work 2/2 his symptoms  Pt denies CP/SOB/F/C, no dysuria, burning on urination or blood in urine  History provided by:  Patient   used: No    Abdominal Pain   Pain location:  Generalized  Pain quality: cramping    Pain radiates to:  Does not radiate  Pain severity:  Moderate  Onset quality:  Gradual  Duration:  1 day  Timing:  Intermittent  Progression:  Waxing and waning  Chronicity:  New  Relieved by:  Nothing  Worsened by:  Nothing  Ineffective treatments:  None tried  Associated symptoms: diarrhea, nausea and vomiting    Associated symptoms: no belching, no chest pain, no chills, no cough, no dysuria, no fever and no shortness of breath    Diarrhea:     Quality:  Watery    Severity:  Moderate    Duration:  1 day    Timing:  Constant    Progression:  Unchanged  Nausea:     Severity:  Moderate    Onset quality:  Gradual    Duration:  1 day    Timing:  Constant    Progression:  Waxing and waning  Vomiting:     Quality:  Stomach contents    Severity:  Moderate    Duration:  1 day    Timing:  Constant    Progression:  Unchanged      Prior to Admission Medications   Prescriptions Last Dose Informant Patient Reported? Taking?    B-D UF III MINI PEN NEEDLES 31G X 5 MM MISC Not Taking at Unknown time Self Yes No   Si each 3 (three) times a day   FREESTYLE LITE test strip  Self Yes Yes   Si each 3 (three) times a day Check blood sugar   amLODIPine (NORVASC) 5 mg tablet   No Yes   Sig: Take 1 tablet (5 mg total) by mouth daily   bismuth subsalicylate (PEPTO BISMOL) 524 mg/30 mL oral suspension   Yes Yes   Sig: Take 15 mL by mouth every 6 (six) hours as needed for indigestion   famotidine (PEPCID) 40 MG tablet   No Yes   Sig: Take 1 tablet (40 mg total) by mouth daily   lisinopril (ZESTRIL) 5 mg tablet   No Yes   Sig: Take 1 tablet (5 mg total) by mouth daily      Facility-Administered Medications: None       Past Medical History:   Diagnosis Date    Adult situational stress disorder     Asthma     Diabetes mellitus ([address] )     GERD (gastroesophageal reflux disease)     Hypertension        Past Surgical History:   Procedure Laterality Date    NO PAST SURGERIES         Family History   Problem Relation Age of Onset    Asthma Mother     Diabetes Mother     Hypertension Mother     Diabetes Father     Hypertension Father      I have reviewed and agree with the history as documented  Social History     Tobacco Use    Smoking status: Former Smoker     Types: Cigarettes    Smokeless tobacco: Never Used   Substance Use Topics    Alcohol use: Yes     Frequency: 2-4 times a month     Comment: occasionally    Drug use: Yes     Types: Marijuana        Review of Systems   Constitutional: Negative for chills and fever  HENT: Negative for dental problem  Eyes: Negative for visual disturbance  Respiratory: Negative for cough and shortness of breath  Cardiovascular: Negative for chest pain  Gastrointestinal: Positive for abdominal pain, diarrhea, nausea and vomiting  Genitourinary: Negative for dysuria and frequency  Musculoskeletal: Negative for neck pain and neck stiffness  Skin: Negative for rash     Neurological: Negative for dizziness, weakness and light-headedness  Psychiatric/Behavioral: Negative for agitation, behavioral problems and confusion  All other systems reviewed and are negative  Physical Exam  Physical Exam   Constitutional: He is oriented to person, place, and time  He appears well-developed and well-nourished  HENT:   Head: Normocephalic and atraumatic  Eyes: EOM are normal    Neck: Normal range of motion  Cardiovascular: Normal rate, regular rhythm and normal heart sounds  Pulmonary/Chest: Effort normal and breath sounds normal    Abdominal: Soft  There is no tenderness  Musculoskeletal: Normal range of motion  Neurological: He is alert and oriented to person, place, and time  Skin: Skin is warm and dry  Psychiatric: He has a normal mood and affect  His behavior is normal    Nursing note and vitals reviewed        Vital Signs  ED Triage Vitals [02/28/19 1907]   Temperature Pulse Respirations Blood Pressure SpO2   97 9 °F (36 6 °C) 83 16 142/89 100 %      Temp src Heart Rate Source Patient Position - Orthostatic VS BP Location FiO2 (%)   -- -- -- -- --      Pain Score       Worst Possible Pain           Vitals:    02/28/19 1907   BP: 142/89   Pulse: 83       Visual Acuity      ED Medications  Medications   ondansetron (ZOFRAN-ODT) dispersible tablet 4 mg (4 mg Oral Given 2/28/19 1938)   dicyclomine (BENTYL) tablet 20 mg (20 mg Oral Given 2/28/19 1938)       Diagnostic Studies  Results Reviewed     Procedure Component Value Units Date/Time    POCT urinalysis dipstick [[id number]]  (Normal) Resulted:  02/28/19 2002    Lab Status:  Final result Specimen:  Urine Updated:  02/28/19 2002     Color, UA Yellow    ED Urine Macroscopic [[id number]] Collected:  02/28/19 1952    Lab Status:  Final result Specimen:  Urine Updated:  02/28/19 1951     Color, UA Yellow     Clarity, UA Clear     pH, UA 5 5     Leukocytes, UA Negative     Nitrite, UA Negative     Protein, UA Negative mg/dl      Glucose, UA Negative mg/dl      Ketones, UA Negative mg/dl      Urobilinogen, UA 0 2 E U /dl      Bilirubin, UA Negative     Blood, UA Negative     Specific Gravity, UA 1 020    Narrative:       CLINITEK RESULT                 No orders to display              Procedures  Procedures       Phone Contacts  ED Phone Contact    ED Course                               MDM  Number of Diagnoses or Management Options  Gastroenteritis: new and does not require workup  Diagnosis management comments: 1  Abdominal pain - Pt with cramping abdominal pain, N/V/D, sick contacts  States seems to have improved a little tonight but had to leave from work and having difficulty with tolerating PO  Will check urine for concentration, give anti-emetics  Amount and/or Complexity of Data Reviewed  Clinical lab tests: ordered and reviewed    Patient Progress  Patient progress: improved      Disposition  Final diagnoses:   Gastroenteritis     Time reflects when diagnosis was documented in both MDM as applicable and the Disposition within this note     Time User Action Codes Description Comment    2/28/2019  7:42 PM Cheyenne Diaz [K52 9] Gastroenteritis       ED Disposition     ED Disposition Condition Date/Time Comment    Discharge Stable Thu Feb 28, 2019  7:42 PM Paul Machado discharge to home/self care              Follow-up Information    None         Discharge Medication List as of 2/28/2019  7:44 PM      START taking these medications    Details   dicyclomine (BENTYL) 20 mg tablet Take 1 tablet (20 mg total) by mouth 2 (two) times a day, Starting Thu 2/28/2019, Print      ondansetron (ZOFRAN-ODT) 4 mg disintegrating tablet Take 1 tablet (4 mg total) by mouth every 6 (six) hours as needed for nausea or vomiting, Starting Thu 2/28/2019, Print         CONTINUE these medications which have NOT CHANGED    Details   amLODIPine (NORVASC) 5 mg tablet Take 1 tablet (5 mg total) by mouth daily, Starting Wed 9/12/2018, Normal      B-D UF III MINI PEN NEEDLES 31G X 5 MM MISC 1 each 3 (three) times a day, Starting Fri 8/10/2018, Historical Med      bismuth subsalicylate (PEPTO BISMOL) 524 mg/30 mL oral suspension Take 15 mL by mouth every 6 (six) hours as needed for indigestion, Historical Med      famotidine (PEPCID) 40 MG tablet Take 1 tablet (40 mg total) by mouth daily, Starting Mon 2/18/2019, Normal      FREESTYLE LITE test strip 1 each 3 (three) times a day Check blood sugar, Starting Fri 8/10/2018, Historical Med      lisinopril (ZESTRIL) 5 mg tablet Take 1 tablet (5 mg total) by mouth daily, Starting Fri 11/16/2018, Normal           No discharge procedures on file      ED Provider  Electronically Signed by           Jessica Clarke MD  02/28/19 5798

## 2019-03-01 NOTE — DISCHARGE INSTRUCTIONS
Take the bentyl as needed for discomfort  Take the zofran as needed for nausea, this can be placed under the tongue to dissolve if you are vomiting  Make sure to drink plenty of fluids to avoid dehydration

## 2019-05-06 ENCOUNTER — TELEPHONE (OUTPATIENT)
Dept: INTERNAL MEDICINE CLINIC | Age: 39
End: 2019-05-06

## 2019-05-06 ENCOUNTER — OFFICE VISIT (OUTPATIENT)
Dept: INTERNAL MEDICINE CLINIC | Age: 39
End: 2019-05-06
Payer: COMMERCIAL

## 2019-05-06 VITALS
OXYGEN SATURATION: 98 % | WEIGHT: 268.6 LBS | SYSTOLIC BLOOD PRESSURE: 140 MMHG | HEIGHT: 70 IN | BODY MASS INDEX: 38.45 KG/M2 | HEART RATE: 72 BPM | DIASTOLIC BLOOD PRESSURE: 100 MMHG | TEMPERATURE: 97.8 F

## 2019-05-06 DIAGNOSIS — R20.2 NUMBNESS AND TINGLING IN BOTH HANDS: Primary | ICD-10-CM

## 2019-05-06 DIAGNOSIS — M65.341 TRIGGER RING FINGER OF RIGHT HAND: ICD-10-CM

## 2019-05-06 DIAGNOSIS — R20.0 NUMBNESS AND TINGLING IN BOTH HANDS: Primary | ICD-10-CM

## 2019-05-06 LAB — SL AMB POCT GLUCOSE BLD: 82

## 2019-05-06 PROCEDURE — 99213 OFFICE O/P EST LOW 20 MIN: CPT | Performed by: NURSE PRACTITIONER

## 2019-05-06 PROCEDURE — 82962 GLUCOSE BLOOD TEST: CPT | Performed by: NURSE PRACTITIONER

## 2019-05-09 ENCOUNTER — OFFICE VISIT (OUTPATIENT)
Dept: OBGYN CLINIC | Facility: MEDICAL CENTER | Age: 39
End: 2019-05-09
Payer: COMMERCIAL

## 2019-05-09 VITALS
HEART RATE: 71 BPM | SYSTOLIC BLOOD PRESSURE: 119 MMHG | HEIGHT: 70 IN | BODY MASS INDEX: 38.08 KG/M2 | WEIGHT: 266 LBS | DIASTOLIC BLOOD PRESSURE: 80 MMHG

## 2019-05-09 DIAGNOSIS — R20.2 NUMBNESS AND TINGLING IN BOTH HANDS: ICD-10-CM

## 2019-05-09 DIAGNOSIS — M65.341 TRIGGER RING FINGER OF RIGHT HAND: Primary | ICD-10-CM

## 2019-05-09 DIAGNOSIS — R20.0 BILATERAL HAND NUMBNESS: ICD-10-CM

## 2019-05-09 DIAGNOSIS — R20.0 NUMBNESS AND TINGLING IN BOTH HANDS: ICD-10-CM

## 2019-05-09 PROCEDURE — 99203 OFFICE O/P NEW LOW 30 MIN: CPT | Performed by: SURGERY

## 2019-05-09 PROCEDURE — 20550 NJX 1 TENDON SHEATH/LIGAMENT: CPT | Performed by: SURGERY

## 2019-05-09 RX ORDER — DEXAMETHASONE SODIUM PHOSPHATE 100 MG/10ML
40 INJECTION INTRAMUSCULAR; INTRAVENOUS
Status: COMPLETED | OUTPATIENT
Start: 2019-05-09 | End: 2019-05-09

## 2019-05-09 RX ORDER — LIDOCAINE HYDROCHLORIDE 10 MG/ML
1 INJECTION, SOLUTION INFILTRATION; PERINEURAL
Status: COMPLETED | OUTPATIENT
Start: 2019-05-09 | End: 2019-05-09

## 2019-05-09 RX ADMIN — DEXAMETHASONE SODIUM PHOSPHATE 40 MG: 100 INJECTION INTRAMUSCULAR; INTRAVENOUS at 14:01

## 2019-05-09 RX ADMIN — LIDOCAINE HYDROCHLORIDE 1 ML: 10 INJECTION, SOLUTION INFILTRATION; PERINEURAL at 14:01

## 2019-05-14 ENCOUNTER — TELEPHONE (OUTPATIENT)
Dept: INTERNAL MEDICINE CLINIC | Age: 39
End: 2019-05-14

## 2019-05-14 ENCOUNTER — HOSPITAL ENCOUNTER (OUTPATIENT)
Dept: NEUROLOGY | Facility: HOSPITAL | Age: 39
Discharge: HOME | End: 2019-05-14
Payer: COMMERCIAL

## 2019-05-14 DIAGNOSIS — R20.2 NUMBNESS AND TINGLING IN BOTH HANDS: ICD-10-CM

## 2019-05-14 DIAGNOSIS — R20.0 NUMBNESS AND TINGLING IN BOTH HANDS: ICD-10-CM

## 2019-05-14 PROCEDURE — 95910 NRV CNDJ TEST 7-8 STUDIES: CPT | Performed by: PSYCHIATRY & NEUROLOGY

## 2019-05-14 PROCEDURE — 95885 MUSC TST DONE W/NERV TST LIM: CPT | Performed by: PSYCHIATRY & NEUROLOGY

## 2019-05-15 ENCOUNTER — TELEPHONE (OUTPATIENT)
Dept: INTERNAL MEDICINE CLINIC | Age: 39
End: 2019-05-15

## 2019-05-16 ENCOUNTER — APPOINTMENT (OUTPATIENT)
Dept: LAB | Facility: MEDICAL CENTER | Age: 39
End: 2019-05-16
Payer: COMMERCIAL

## 2019-05-16 ENCOUNTER — OFFICE VISIT (OUTPATIENT)
Dept: OBGYN CLINIC | Facility: MEDICAL CENTER | Age: 39
End: 2019-05-16
Payer: COMMERCIAL

## 2019-05-16 VITALS
BODY MASS INDEX: 37.65 KG/M2 | DIASTOLIC BLOOD PRESSURE: 112 MMHG | HEART RATE: 69 BPM | WEIGHT: 263 LBS | SYSTOLIC BLOOD PRESSURE: 163 MMHG | HEIGHT: 70 IN

## 2019-05-16 DIAGNOSIS — M79.10 MYALGIA: ICD-10-CM

## 2019-05-16 DIAGNOSIS — E11.65 TYPE 2 DIABETES MELLITUS WITH HYPERGLYCEMIA, WITHOUT LONG-TERM CURRENT USE OF INSULIN (HCC): ICD-10-CM

## 2019-05-16 DIAGNOSIS — M65.341 TRIGGER RING FINGER OF RIGHT HAND: ICD-10-CM

## 2019-05-16 DIAGNOSIS — G56.03 CARPAL TUNNEL SYNDROME, BILATERAL: ICD-10-CM

## 2019-05-16 DIAGNOSIS — G56.03 CARPAL TUNNEL SYNDROME, BILATERAL: Primary | ICD-10-CM

## 2019-05-16 LAB
ANION GAP SERPL CALCULATED.3IONS-SCNC: 3 MMOL/L (ref 4–13)
BUN SERPL-MCNC: 14 MG/DL (ref 5–25)
CALCIUM SERPL-MCNC: 8.7 MG/DL (ref 8.3–10.1)
CHLORIDE SERPL-SCNC: 111 MMOL/L (ref 100–108)
CK MB SERPL-MCNC: 3.5 NG/ML (ref 0–5)
CK MB SERPL-MCNC: <1 % (ref 0–2.5)
CK SERPL-CCNC: 488 U/L (ref 39–308)
CO2 SERPL-SCNC: 26 MMOL/L (ref 21–32)
CREAT SERPL-MCNC: 1.12 MG/DL (ref 0.6–1.3)
EST. AVERAGE GLUCOSE BLD GHB EST-MCNC: 111 MG/DL
GFR SERPL CREATININE-BSD FRML MDRD: 96 ML/MIN/1.73SQ M
GLUCOSE P FAST SERPL-MCNC: 108 MG/DL (ref 65–99)
HBA1C MFR BLD: 5.5 % (ref 4.2–6.3)
POTASSIUM SERPL-SCNC: 3.9 MMOL/L (ref 3.5–5.3)
SODIUM SERPL-SCNC: 140 MMOL/L (ref 136–145)

## 2019-05-16 PROCEDURE — 82550 ASSAY OF CK (CPK): CPT

## 2019-05-16 PROCEDURE — 80048 BASIC METABOLIC PNL TOTAL CA: CPT

## 2019-05-16 PROCEDURE — 83036 HEMOGLOBIN GLYCOSYLATED A1C: CPT

## 2019-05-16 PROCEDURE — 82553 CREATINE MB FRACTION: CPT

## 2019-05-16 PROCEDURE — 99213 OFFICE O/P EST LOW 20 MIN: CPT | Performed by: SURGERY

## 2019-05-16 PROCEDURE — 36415 COLL VENOUS BLD VENIPUNCTURE: CPT

## 2019-05-16 RX ORDER — CEFAZOLIN SODIUM 2 G/50ML
2000 SOLUTION INTRAVENOUS ONCE
Status: CANCELLED | OUTPATIENT
Start: 2019-05-16 | End: 2019-05-16

## 2019-05-21 ENCOUNTER — TELEPHONE (OUTPATIENT)
Dept: INTERNAL MEDICINE CLINIC | Age: 39
End: 2019-05-21

## 2019-05-21 DIAGNOSIS — M25.50 ARTHRALGIA, UNSPECIFIED JOINT: ICD-10-CM

## 2019-05-21 DIAGNOSIS — M79.10 MYALGIA: Primary | ICD-10-CM

## 2019-05-21 DIAGNOSIS — R74.8 ELEVATED CK: ICD-10-CM

## 2019-05-22 ENCOUNTER — APPOINTMENT (OUTPATIENT)
Dept: LAB | Age: 39
End: 2019-05-22
Payer: COMMERCIAL

## 2019-05-22 DIAGNOSIS — M79.10 MYALGIA: ICD-10-CM

## 2019-05-22 DIAGNOSIS — R74.8 ELEVATED CK: ICD-10-CM

## 2019-05-22 DIAGNOSIS — M25.50 ARTHRALGIA, UNSPECIFIED JOINT: ICD-10-CM

## 2019-05-22 LAB
CRP SERPL QL: <3 MG/L
ERYTHROCYTE [SEDIMENTATION RATE] IN BLOOD: 5 MM/HOUR (ref 0–10)

## 2019-05-22 PROCEDURE — 86038 ANTINUCLEAR ANTIBODIES: CPT

## 2019-05-22 PROCEDURE — 86430 RHEUMATOID FACTOR TEST QUAL: CPT

## 2019-05-22 PROCEDURE — 85652 RBC SED RATE AUTOMATED: CPT

## 2019-05-22 PROCEDURE — 82085 ASSAY OF ALDOLASE: CPT

## 2019-05-22 PROCEDURE — 86200 CCP ANTIBODY: CPT

## 2019-05-22 PROCEDURE — 86140 C-REACTIVE PROTEIN: CPT

## 2019-05-22 PROCEDURE — 36415 COLL VENOUS BLD VENIPUNCTURE: CPT

## 2019-05-23 LAB
ALDOLASE SERPL-CCNC: 8.8 U/L (ref 3.3–10.3)
RHEUMATOID FACT SER QL LA: NEGATIVE

## 2019-05-24 LAB
CCP IGA+IGG SERPL IA-ACNC: 4 UNITS (ref 0–19)
RYE IGE QN: NEGATIVE

## 2019-05-29 ENCOUNTER — ANESTHESIA EVENT (OUTPATIENT)
Dept: PERIOP | Facility: AMBULARY SURGERY CENTER | Age: 39
End: 2019-05-29
Payer: COMMERCIAL

## 2019-05-31 ENCOUNTER — OFFICE VISIT (OUTPATIENT)
Dept: INTERNAL MEDICINE CLINIC | Age: 39
End: 2019-05-31
Payer: COMMERCIAL

## 2019-05-31 VITALS
BODY MASS INDEX: 38.28 KG/M2 | OXYGEN SATURATION: 97 % | TEMPERATURE: 97.2 F | DIASTOLIC BLOOD PRESSURE: 84 MMHG | HEART RATE: 59 BPM | WEIGHT: 266.8 LBS | SYSTOLIC BLOOD PRESSURE: 128 MMHG

## 2019-05-31 DIAGNOSIS — K21.9 GASTROESOPHAGEAL REFLUX DISEASE, ESOPHAGITIS PRESENCE NOT SPECIFIED: ICD-10-CM

## 2019-05-31 DIAGNOSIS — G56.03 CARPAL TUNNEL SYNDROME, BILATERAL: ICD-10-CM

## 2019-05-31 DIAGNOSIS — M65.341 TRIGGER RING FINGER OF RIGHT HAND: Primary | ICD-10-CM

## 2019-05-31 PROCEDURE — 99213 OFFICE O/P EST LOW 20 MIN: CPT | Performed by: NURSE PRACTITIONER

## 2019-05-31 RX ORDER — FAMOTIDINE 40 MG/1
40 TABLET, FILM COATED ORAL DAILY
Qty: 30 TABLET | Refills: 3 | Status: SHIPPED | OUTPATIENT
Start: 2019-05-31 | End: 2020-02-27 | Stop reason: SDUPTHER

## 2019-05-31 RX ORDER — PREDNISONE 10 MG/1
TABLET ORAL
Qty: 30 TABLET | Refills: 0 | Status: SHIPPED | OUTPATIENT
Start: 2019-05-31 | End: 2019-06-12

## 2019-05-31 RX ORDER — TRAMADOL HYDROCHLORIDE 50 MG/1
50 TABLET ORAL EVERY 6 HOURS PRN
Qty: 10 TABLET | Refills: 0 | Status: SHIPPED | OUTPATIENT
Start: 2019-05-31 | End: 2020-09-01 | Stop reason: ALTCHOICE

## 2019-06-07 ENCOUNTER — TRANSCRIBE ORDERS (OUTPATIENT)
Dept: LAB | Facility: CLINIC | Age: 39
End: 2019-06-07

## 2019-06-11 ENCOUNTER — ANESTHESIA (OUTPATIENT)
Dept: PERIOP | Facility: AMBULARY SURGERY CENTER | Age: 39
End: 2019-06-11
Payer: COMMERCIAL

## 2019-06-11 ENCOUNTER — HOSPITAL ENCOUNTER (OUTPATIENT)
Facility: AMBULARY SURGERY CENTER | Age: 39
Setting detail: OUTPATIENT SURGERY
Discharge: HOME | End: 2019-06-11
Attending: SURGERY | Admitting: SURGERY
Payer: COMMERCIAL

## 2019-06-11 VITALS
DIASTOLIC BLOOD PRESSURE: 82 MMHG | OXYGEN SATURATION: 97 % | HEIGHT: 70 IN | HEART RATE: 64 BPM | WEIGHT: 266 LBS | TEMPERATURE: 97.3 F | BODY MASS INDEX: 38.08 KG/M2 | RESPIRATION RATE: 18 BRPM | SYSTOLIC BLOOD PRESSURE: 141 MMHG

## 2019-06-11 DIAGNOSIS — G56.03 CARPAL TUNNEL SYNDROME, BILATERAL: ICD-10-CM

## 2019-06-11 DIAGNOSIS — M65.341 TRIGGER RING FINGER OF RIGHT HAND: ICD-10-CM

## 2019-06-11 DIAGNOSIS — Z48.89 AFTERCARE FOLLOWING SURGERY: Primary | ICD-10-CM

## 2019-06-11 PROCEDURE — 64721 CARPAL TUNNEL SURGERY: CPT | Performed by: PHYSICIAN ASSISTANT

## 2019-06-11 PROCEDURE — 26055 INCISE FINGER TENDON SHEATH: CPT | Performed by: SURGERY

## 2019-06-11 PROCEDURE — 26055 INCISE FINGER TENDON SHEATH: CPT | Performed by: PHYSICIAN ASSISTANT

## 2019-06-11 PROCEDURE — 64721 CARPAL TUNNEL SURGERY: CPT | Performed by: SURGERY

## 2019-06-11 RX ORDER — ACETAMINOPHEN 325 MG/1
650 TABLET ORAL EVERY 6 HOURS PRN
Status: DISCONTINUED | OUTPATIENT
Start: 2019-06-11 | End: 2019-06-11 | Stop reason: HOSPADM

## 2019-06-11 RX ORDER — HYDROCODONE BITARTRATE AND ACETAMINOPHEN 5; 325 MG/1; MG/1
1 TABLET ORAL EVERY 6 HOURS PRN
Qty: 7 TABLET | Refills: 0 | Status: SHIPPED | OUTPATIENT
Start: 2019-06-11 | End: 2019-06-21

## 2019-06-11 RX ORDER — MAGNESIUM HYDROXIDE 1200 MG/15ML
LIQUID ORAL AS NEEDED
Status: DISCONTINUED | OUTPATIENT
Start: 2019-06-11 | End: 2019-06-11 | Stop reason: HOSPADM

## 2019-06-11 RX ORDER — SODIUM CHLORIDE, SODIUM LACTATE, POTASSIUM CHLORIDE, CALCIUM CHLORIDE 600; 310; 30; 20 MG/100ML; MG/100ML; MG/100ML; MG/100ML
125 INJECTION, SOLUTION INTRAVENOUS CONTINUOUS
Status: DISCONTINUED | OUTPATIENT
Start: 2019-06-11 | End: 2019-06-11 | Stop reason: HOSPADM

## 2019-06-11 RX ORDER — CEFAZOLIN SODIUM 2 G/50ML
2000 SOLUTION INTRAVENOUS ONCE
Status: COMPLETED | OUTPATIENT
Start: 2019-06-11 | End: 2019-06-11

## 2019-06-11 RX ORDER — PROPOFOL 10 MG/ML
INJECTION, EMULSION INTRAVENOUS AS NEEDED
Status: DISCONTINUED | OUTPATIENT
Start: 2019-06-11 | End: 2019-06-11 | Stop reason: SURG

## 2019-06-11 RX ORDER — ONDANSETRON 2 MG/ML
4 INJECTION INTRAMUSCULAR; INTRAVENOUS EVERY 6 HOURS PRN
Status: DISCONTINUED | OUTPATIENT
Start: 2019-06-11 | End: 2019-06-11 | Stop reason: HOSPADM

## 2019-06-11 RX ORDER — ONDANSETRON 2 MG/ML
4 INJECTION INTRAMUSCULAR; INTRAVENOUS ONCE AS NEEDED
Status: DISCONTINUED | OUTPATIENT
Start: 2019-06-11 | End: 2019-06-11 | Stop reason: HOSPADM

## 2019-06-11 RX ORDER — ONDANSETRON 2 MG/ML
INJECTION INTRAMUSCULAR; INTRAVENOUS AS NEEDED
Status: DISCONTINUED | OUTPATIENT
Start: 2019-06-11 | End: 2019-06-11 | Stop reason: SURG

## 2019-06-11 RX ORDER — FENTANYL CITRATE 50 UG/ML
INJECTION, SOLUTION INTRAMUSCULAR; INTRAVENOUS AS NEEDED
Status: DISCONTINUED | OUTPATIENT
Start: 2019-06-11 | End: 2019-06-11 | Stop reason: SURG

## 2019-06-11 RX ORDER — PROPOFOL 10 MG/ML
INJECTION, EMULSION INTRAVENOUS CONTINUOUS PRN
Status: DISCONTINUED | OUTPATIENT
Start: 2019-06-11 | End: 2019-06-11 | Stop reason: SURG

## 2019-06-11 RX ORDER — MIDAZOLAM HYDROCHLORIDE 1 MG/ML
INJECTION INTRAMUSCULAR; INTRAVENOUS AS NEEDED
Status: DISCONTINUED | OUTPATIENT
Start: 2019-06-11 | End: 2019-06-11 | Stop reason: SURG

## 2019-06-11 RX ADMIN — SODIUM CHLORIDE, SODIUM LACTATE, POTASSIUM CHLORIDE, AND CALCIUM CHLORIDE: .6; .31; .03; .02 INJECTION, SOLUTION INTRAVENOUS at 07:09

## 2019-06-11 RX ADMIN — FENTANYL CITRATE 50 MCG: 50 INJECTION, SOLUTION INTRAMUSCULAR; INTRAVENOUS at 08:46

## 2019-06-11 RX ADMIN — ONDANSETRON 4 MG: 2 INJECTION INTRAMUSCULAR; INTRAVENOUS at 08:48

## 2019-06-11 RX ADMIN — MIDAZOLAM HYDROCHLORIDE 2 MG: 1 INJECTION, SOLUTION INTRAMUSCULAR; INTRAVENOUS at 08:16

## 2019-06-11 RX ADMIN — FENTANYL CITRATE 50 MCG: 50 INJECTION, SOLUTION INTRAMUSCULAR; INTRAVENOUS at 08:23

## 2019-06-11 RX ADMIN — PROPOFOL 130 MCG/KG/MIN: 10 INJECTION, EMULSION INTRAVENOUS at 08:23

## 2019-06-11 RX ADMIN — PROPOFOL 100 MG: 10 INJECTION, EMULSION INTRAVENOUS at 08:22

## 2019-06-11 RX ADMIN — CEFAZOLIN SODIUM 2000 MG: 2 SOLUTION INTRAVENOUS at 08:22

## 2019-06-12 ENCOUNTER — TELEPHONE (OUTPATIENT)
Dept: INTERNAL MEDICINE CLINIC | Facility: CLINIC | Age: 39
End: 2019-06-12

## 2019-06-13 ENCOUNTER — TELEPHONE (OUTPATIENT)
Dept: OBGYN CLINIC | Facility: HOSPITAL | Age: 39
End: 2019-06-13

## 2019-06-14 ENCOUNTER — HOSPITAL ENCOUNTER (EMERGENCY)
Facility: HOSPITAL | Age: 39
Discharge: HOME | End: 2019-06-14
Attending: EMERGENCY MEDICINE | Admitting: EMERGENCY MEDICINE
Payer: COMMERCIAL

## 2019-06-14 VITALS
BODY MASS INDEX: 38.17 KG/M2 | TEMPERATURE: 97.9 F | SYSTOLIC BLOOD PRESSURE: 141 MMHG | RESPIRATION RATE: 18 BRPM | HEART RATE: 68 BPM | DIASTOLIC BLOOD PRESSURE: 84 MMHG | OXYGEN SATURATION: 99 % | WEIGHT: 266 LBS

## 2019-06-14 DIAGNOSIS — Z48.01 DRESSING CHANGE OR REMOVAL, SURGICAL WOUND: Primary | ICD-10-CM

## 2019-06-14 PROBLEM — E66.01 MORBID (SEVERE) OBESITY DUE TO EXCESS CALORIES (HCC): Status: ACTIVE | Noted: 2019-06-14

## 2019-06-14 PROCEDURE — 99283 EMERGENCY DEPT VISIT LOW MDM: CPT

## 2019-06-14 PROCEDURE — 99281 EMR DPT VST MAYX REQ PHY/QHP: CPT | Performed by: EMERGENCY MEDICINE

## 2019-06-17 ENCOUNTER — OFFICE VISIT (OUTPATIENT)
Dept: INTERNAL MEDICINE CLINIC | Age: 39
End: 2019-06-17
Payer: COMMERCIAL

## 2019-06-17 VITALS
OXYGEN SATURATION: 98 % | SYSTOLIC BLOOD PRESSURE: 128 MMHG | TEMPERATURE: 97.8 F | BODY MASS INDEX: 38.42 KG/M2 | HEIGHT: 70 IN | DIASTOLIC BLOOD PRESSURE: 78 MMHG | HEART RATE: 80 BPM | WEIGHT: 268.4 LBS

## 2019-06-17 DIAGNOSIS — M79.10 MYALGIA: ICD-10-CM

## 2019-06-17 DIAGNOSIS — M65.341 TRIGGER RING FINGER OF RIGHT HAND: ICD-10-CM

## 2019-06-17 DIAGNOSIS — K21.9 GASTROESOPHAGEAL REFLUX DISEASE, ESOPHAGITIS PRESENCE NOT SPECIFIED: ICD-10-CM

## 2019-06-17 DIAGNOSIS — E66.9 OBESITY (BMI 30-39.9): ICD-10-CM

## 2019-06-17 DIAGNOSIS — R74.8 ELEVATED CK: ICD-10-CM

## 2019-06-17 DIAGNOSIS — E66.01 MORBID (SEVERE) OBESITY DUE TO EXCESS CALORIES (HCC): ICD-10-CM

## 2019-06-17 DIAGNOSIS — E11.9 TYPE 2 DIABETES MELLITUS WITHOUT COMPLICATION, WITH LONG-TERM CURRENT USE OF INSULIN (HCC): ICD-10-CM

## 2019-06-17 DIAGNOSIS — Z79.4 TYPE 2 DIABETES MELLITUS WITHOUT COMPLICATION, WITH LONG-TERM CURRENT USE OF INSULIN (HCC): ICD-10-CM

## 2019-06-17 DIAGNOSIS — I10 ESSENTIAL HYPERTENSION: Primary | ICD-10-CM

## 2019-06-17 DIAGNOSIS — B35.1 ONYCHOMYCOSIS: ICD-10-CM

## 2019-06-17 DIAGNOSIS — G56.03 CARPAL TUNNEL SYNDROME, BILATERAL: ICD-10-CM

## 2019-06-17 PROCEDURE — 3078F DIAST BP <80 MM HG: CPT | Performed by: NURSE PRACTITIONER

## 2019-06-17 PROCEDURE — 99214 OFFICE O/P EST MOD 30 MIN: CPT | Performed by: NURSE PRACTITIONER

## 2019-06-17 PROCEDURE — 3074F SYST BP LT 130 MM HG: CPT | Performed by: NURSE PRACTITIONER

## 2019-06-17 RX ORDER — AMLODIPINE BESYLATE 10 MG/1
10 TABLET ORAL DAILY
Qty: 30 TABLET | Refills: 3 | Status: SHIPPED | OUTPATIENT
Start: 2019-06-17 | End: 2020-02-27 | Stop reason: SDUPTHER

## 2019-06-24 ENCOUNTER — OFFICE VISIT (OUTPATIENT)
Dept: OBGYN CLINIC | Facility: CLINIC | Age: 39
End: 2019-06-24

## 2019-06-24 VITALS
DIASTOLIC BLOOD PRESSURE: 99 MMHG | BODY MASS INDEX: 37.8 KG/M2 | WEIGHT: 270 LBS | SYSTOLIC BLOOD PRESSURE: 137 MMHG | HEART RATE: 64 BPM | HEIGHT: 71 IN

## 2019-06-24 DIAGNOSIS — Z98.890 S/P CARPAL TUNNEL RELEASE: Primary | ICD-10-CM

## 2019-06-24 DIAGNOSIS — Z98.890 S/P TRIGGER FINGER RELEASE: ICD-10-CM

## 2019-06-24 PROCEDURE — 99024 POSTOP FOLLOW-UP VISIT: CPT | Performed by: SURGERY

## 2019-06-27 ENCOUNTER — OFFICE VISIT (OUTPATIENT)
Dept: OBGYN CLINIC | Facility: CLINIC | Age: 39
End: 2019-06-27

## 2019-06-27 VITALS
HEIGHT: 71 IN | SYSTOLIC BLOOD PRESSURE: 116 MMHG | DIASTOLIC BLOOD PRESSURE: 77 MMHG | HEART RATE: 74 BPM | WEIGHT: 264 LBS | BODY MASS INDEX: 36.96 KG/M2

## 2019-06-27 DIAGNOSIS — Z98.890 S/P TRIGGER FINGER RELEASE: ICD-10-CM

## 2019-06-27 DIAGNOSIS — Z98.890 S/P CARPAL TUNNEL RELEASE: Primary | ICD-10-CM

## 2019-06-27 PROCEDURE — 99024 POSTOP FOLLOW-UP VISIT: CPT | Performed by: SURGERY

## 2019-07-03 ENCOUNTER — OFFICE VISIT (OUTPATIENT)
Dept: PODIATRY | Facility: CLINIC | Age: 39
End: 2019-07-03
Payer: COMMERCIAL

## 2019-07-03 VITALS
WEIGHT: 265 LBS | SYSTOLIC BLOOD PRESSURE: 132 MMHG | BODY MASS INDEX: 35.12 KG/M2 | DIASTOLIC BLOOD PRESSURE: 90 MMHG | HEIGHT: 73 IN

## 2019-07-03 DIAGNOSIS — B35.3 TINEA PEDIS OF BOTH FEET: ICD-10-CM

## 2019-07-03 DIAGNOSIS — E11.9 TYPE 2 DIABETES MELLITUS WITHOUT COMPLICATION, WITH LONG-TERM CURRENT USE OF INSULIN (HCC): ICD-10-CM

## 2019-07-03 DIAGNOSIS — B35.1 ONYCHOMYCOSIS: Primary | ICD-10-CM

## 2019-07-03 DIAGNOSIS — Z79.4 TYPE 2 DIABETES MELLITUS WITHOUT COMPLICATION, WITH LONG-TERM CURRENT USE OF INSULIN (HCC): ICD-10-CM

## 2019-07-03 PROCEDURE — 99243 OFF/OP CNSLTJ NEW/EST LOW 30: CPT | Performed by: PODIATRIST

## 2019-07-03 NOTE — PROGRESS NOTES
Assessment:  1  Onychomycosis  2  Tinea Pedis  3  Diabetes Mellitus with low risk for foot ulcer by exam    Plan:  I discussed treatment options with him including benign neglect, topicals, and oral therapy  He wants to proceed with the treatment plan with highest success rate  Will plan on 3 month course of terbinafine after completing baseline LFT's  I discussed the need to repeat LFT's after 6 weeks  I discussed the need to avoid statin drugs, alcohol, tylenol, etc  during therapy  High risk  foot precautions reviewed with patient including the need to wear protective shoegear at all times when walking including in the home, the need to check feet all surfaces daily with a mirror and report and skin breaks to podiatry, the need to apply an emollient to skin of feet daily  Problem List Items Addressed This Visit        Endocrine    Type 2 diabetes mellitus without complication, with long-term current use of insulin ([address] )       Musculoskeletal and Integument    Tinea unguium - Primary    Tinea pedis             Diagnoses and all orders for this visit:    Onychomycosis  -     Ambulatory referral to Podiatry  -     Hepatic function panel; Future    Type 2 diabetes mellitus without complication, with long-term current use of insulin ([facility])  -     Ambulatory referral to Podiatry    Tinea pedis of both feet          Subjective:      Patient ID: Kianna Reeves is a 45 y o  male  This gentleman presents for the first time with a cc of thick discolored toenails and chronically dry skin of his feet that hasn't responded to OTC creams  He noted the changes to his feet after being incarcerated and using communal showers  He notes the condition seems to be worsening over time  It's been going on for about 5 years        The following portions of the patient's history were reviewed and updated as appropriate: allergies, current medications, past family history, past medical history, past social history, past surgical history and problem list     Review of Systems   Constitutional: Negative  Respiratory: Negative for chest tightness and shortness of breath  Cardiovascular: Negative  Gastrointestinal: Negative for abdominal distention and abdominal pain  Musculoskeletal: Negative  Skin: Positive for rash  Neurological: Negative  Hematological: Negative  Psychiatric/Behavioral: Negative  Objective:      /90   Ht 6' 0 5" (1 842 m) Comment: verbal  Wt 120 kg (265 lb) Comment: verebal  BMI 35 45 kg/m²          Physical Exam   Constitutional: He is oriented to person, place, and time  He appears well-developed and well-nourished  No distress  HENT:   Head: Normocephalic  Eyes: Pupils are equal, round, and reactive to light  Neck: No JVD present  Cardiovascular:   Pulses:       Dorsalis pedis pulses are 3+ on the right side, and 3+ on the left side  Posterior tibial pulses are 3+ on the right side, and 3+ on the left side  Pulmonary/Chest: Effort normal and breath sounds normal    Abdominal: Soft  Bowel sounds are normal    Musculoskeletal: Normal range of motion  Right foot: There is normal range of motion and no deformity  Left foot: There is normal range of motion and no deformity  Feet:   Right Foot:   Protective Sensation: 10 sites tested  10 sites sensed  Skin Integrity: Positive for dry skin  Negative for ulcer, blister, skin breakdown, erythema, warmth or callus  Left Foot:   Protective Sensation: 10 sites tested  10 sites sensed  Skin Integrity: Positive for dry skin  Negative for ulcer, blister, skin breakdown, erythema, warmth or callus  Neurological: He is alert and oriented to person, place, and time  Skin: Capillary refill takes less than 2 seconds  Rash noted  He is not diaphoretic  Nails yellow-brown, 5mm thick, dystrophic, with crumbling subugunal debris x 10  Psychiatric: He has a normal mood and affect   His behavior is normal  I note a mocassin-foot distribution of red, scaling keratotic skin on the glabrous surfaces of his feet

## 2019-07-07 ENCOUNTER — HOSPITAL ENCOUNTER (EMERGENCY)
Facility: HOSPITAL | Age: 39
Discharge: HOME | End: 2019-07-07
Attending: EMERGENCY MEDICINE
Payer: COMMERCIAL

## 2019-07-07 ENCOUNTER — APPOINTMENT (EMERGENCY)
Dept: CT IMAGING | Facility: HOSPITAL | Age: 39
End: 2019-07-07
Payer: COMMERCIAL

## 2019-07-07 VITALS
OXYGEN SATURATION: 98 % | HEART RATE: 68 BPM | TEMPERATURE: 98.7 F | RESPIRATION RATE: 16 BRPM | DIASTOLIC BLOOD PRESSURE: 89 MMHG | SYSTOLIC BLOOD PRESSURE: 150 MMHG

## 2019-07-07 DIAGNOSIS — S30.1XXA CONTUSION, FLANK, INITIAL ENCOUNTER: Primary | ICD-10-CM

## 2019-07-07 DIAGNOSIS — S70.01XA CONTUSION OF RIGHT HIP: ICD-10-CM

## 2019-07-07 LAB
ANION GAP SERPL CALCULATED.3IONS-SCNC: 8 MMOL/L (ref 4–13)
BACTERIA UR QL AUTO: NORMAL /HPF
BASOPHILS # BLD AUTO: 0.02 THOUSANDS/ΜL (ref 0–0.1)
BASOPHILS NFR BLD AUTO: 1 % (ref 0–1)
BILIRUB UR QL STRIP: NEGATIVE
BUN SERPL-MCNC: 13 MG/DL (ref 5–25)
CALCIUM SERPL-MCNC: 9.2 MG/DL (ref 8.3–10.1)
CHLORIDE SERPL-SCNC: 105 MMOL/L (ref 100–108)
CLARITY UR: CLEAR
CO2 SERPL-SCNC: 26 MMOL/L (ref 21–32)
COLOR UR: YELLOW
CREAT SERPL-MCNC: 1.02 MG/DL (ref 0.6–1.3)
EOSINOPHIL # BLD AUTO: 0.09 THOUSAND/ΜL (ref 0–0.61)
EOSINOPHIL NFR BLD AUTO: 2 % (ref 0–6)
ERYTHROCYTE [DISTWIDTH] IN BLOOD BY AUTOMATED COUNT: 12.9 % (ref 11.6–15.1)
GFR SERPL CREATININE-BSD FRML MDRD: 107 ML/MIN/1.73SQ M
GLUCOSE SERPL-MCNC: 98 MG/DL (ref 65–140)
GLUCOSE UR STRIP-MCNC: NEGATIVE MG/DL
HCT VFR BLD AUTO: 39.6 % (ref 36.5–49.3)
HGB BLD-MCNC: 13.4 G/DL (ref 12–17)
HGB UR QL STRIP.AUTO: ABNORMAL
IMM GRANULOCYTES # BLD AUTO: 0.01 THOUSAND/UL (ref 0–0.2)
IMM GRANULOCYTES NFR BLD AUTO: 0 % (ref 0–2)
KETONES UR STRIP-MCNC: NEGATIVE MG/DL
LEUKOCYTE ESTERASE UR QL STRIP: NEGATIVE
LYMPHOCYTES # BLD AUTO: 1.74 THOUSANDS/ΜL (ref 0.6–4.47)
LYMPHOCYTES NFR BLD AUTO: 40 % (ref 14–44)
MCH RBC QN AUTO: 27.5 PG (ref 26.8–34.3)
MCHC RBC AUTO-ENTMCNC: 33.8 G/DL (ref 31.4–37.4)
MCV RBC AUTO: 81 FL (ref 82–98)
MONOCYTES # BLD AUTO: 0.42 THOUSAND/ΜL (ref 0.17–1.22)
MONOCYTES NFR BLD AUTO: 10 % (ref 4–12)
NEUTROPHILS # BLD AUTO: 2.05 THOUSANDS/ΜL (ref 1.85–7.62)
NEUTS SEG NFR BLD AUTO: 47 % (ref 43–75)
NITRITE UR QL STRIP: NEGATIVE
NON-SQ EPI CELLS URNS QL MICRO: NORMAL /HPF
NRBC BLD AUTO-RTO: 0 /100 WBCS
PH UR STRIP.AUTO: 7 [PH] (ref 4.5–8)
PLATELET # BLD AUTO: 227 THOUSANDS/UL (ref 149–390)
PMV BLD AUTO: 10.1 FL (ref 8.9–12.7)
POTASSIUM SERPL-SCNC: 3.4 MMOL/L (ref 3.5–5.3)
PROT UR STRIP-MCNC: NEGATIVE MG/DL
RBC # BLD AUTO: 4.88 MILLION/UL (ref 3.88–5.62)
RBC #/AREA URNS AUTO: NORMAL /HPF
SODIUM SERPL-SCNC: 139 MMOL/L (ref 136–145)
SP GR UR STRIP.AUTO: 1.01 (ref 1–1.03)
UROBILINOGEN UR QL STRIP.AUTO: 0.2 E.U./DL
WBC # BLD AUTO: 4.33 THOUSAND/UL (ref 4.31–10.16)
WBC #/AREA URNS AUTO: NORMAL /HPF

## 2019-07-07 PROCEDURE — 36415 COLL VENOUS BLD VENIPUNCTURE: CPT | Performed by: EMERGENCY MEDICINE

## 2019-07-07 PROCEDURE — 80048 BASIC METABOLIC PNL TOTAL CA: CPT | Performed by: EMERGENCY MEDICINE

## 2019-07-07 PROCEDURE — 85025 COMPLETE CBC W/AUTO DIFF WBC: CPT | Performed by: EMERGENCY MEDICINE

## 2019-07-07 PROCEDURE — 96374 THER/PROPH/DIAG INJ IV PUSH: CPT

## 2019-07-07 PROCEDURE — 96361 HYDRATE IV INFUSION ADD-ON: CPT

## 2019-07-07 PROCEDURE — 81001 URINALYSIS AUTO W/SCOPE: CPT

## 2019-07-07 PROCEDURE — 74177 CT ABD & PELVIS W/CONTRAST: CPT

## 2019-07-07 PROCEDURE — 99284 EMERGENCY DEPT VISIT MOD MDM: CPT

## 2019-07-07 PROCEDURE — 99283 EMERGENCY DEPT VISIT LOW MDM: CPT | Performed by: EMERGENCY MEDICINE

## 2019-07-07 RX ORDER — KETOROLAC TROMETHAMINE 30 MG/ML
15 INJECTION, SOLUTION INTRAMUSCULAR; INTRAVENOUS ONCE
Status: COMPLETED | OUTPATIENT
Start: 2019-07-07 | End: 2019-07-07

## 2019-07-07 RX ORDER — ACETAMINOPHEN 325 MG/1
975 TABLET ORAL ONCE
Status: COMPLETED | OUTPATIENT
Start: 2019-07-07 | End: 2019-07-07

## 2019-07-07 RX ADMIN — IOHEXOL 100 ML: 350 INJECTION, SOLUTION INTRAVENOUS at 10:20

## 2019-07-07 RX ADMIN — SODIUM CHLORIDE 1000 ML: 0.9 INJECTION, SOLUTION INTRAVENOUS at 09:38

## 2019-07-07 RX ADMIN — KETOROLAC TROMETHAMINE 15 MG: 30 INJECTION, SOLUTION INTRAMUSCULAR at 11:02

## 2019-07-07 RX ADMIN — ACETAMINOPHEN 975 MG: 325 TABLET ORAL at 11:03
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
## 2019-07-07 NOTE — DISCHARGE INSTRUCTIONS
Take Tylenol and/or Ibuprofen as needed for pain  Follow up with family doctor    Incidental finding on CT:  Indeterminate 2 4 cm hypodense lesion in the right lobe of the liver  Findings may reflect a hemangioma  Recommend confirmation with nonemergent liver MRI or dedicated liver protocol CT

## 2019-07-07 NOTE — ED NOTES
Report given to KAZ Glaser Standard  Aware tylenol has not been given d/t awaiting CT results        Vicky Corey RN  07/07/19 7877

## 2019-07-09 ENCOUNTER — TRANSCRIBE ORDERS (OUTPATIENT)
Dept: ADMINISTRATIVE | Facility: HOSPITAL | Age: 39
End: 2019-07-09

## 2019-07-09 ENCOUNTER — APPOINTMENT (OUTPATIENT)
Dept: LAB | Facility: HOSPITAL | Age: 39
End: 2019-07-09
Attending: PODIATRIST
Payer: COMMERCIAL

## 2019-07-09 DIAGNOSIS — Z79.899 ENCOUNTER FOR LONG-TERM (CURRENT) USE OF OTHER MEDICATIONS: Primary | ICD-10-CM

## 2019-07-09 DIAGNOSIS — Z79.899 ENCOUNTER FOR LONG-TERM (CURRENT) USE OF OTHER MEDICATIONS: ICD-10-CM

## 2019-07-09 DIAGNOSIS — B35.1 ONYCHOMYCOSIS: ICD-10-CM

## 2019-07-09 LAB
ALBUMIN SERPL BCP-MCNC: 3.6 G/DL (ref 3.5–5)
ALP SERPL-CCNC: 70 U/L (ref 46–116)
ALT SERPL W P-5'-P-CCNC: 44 U/L (ref 12–78)
ANION GAP SERPL CALCULATED.3IONS-SCNC: 6 MMOL/L (ref 4–13)
AST SERPL W P-5'-P-CCNC: 26 U/L (ref 5–45)
BASOPHILS # BLD AUTO: 0.02 THOUSANDS/ΜL (ref 0–0.1)
BASOPHILS NFR BLD AUTO: 0 % (ref 0–1)
BILIRUB DIRECT SERPL-MCNC: 0.19 MG/DL (ref 0–0.2)
BILIRUB SERPL-MCNC: 0.75 MG/DL (ref 0.2–1)
BUN SERPL-MCNC: 13 MG/DL (ref 5–25)
CALCIUM SERPL-MCNC: 9 MG/DL (ref 8.3–10.1)
CHLORIDE SERPL-SCNC: 107 MMOL/L (ref 100–108)
CHOLEST SERPL-MCNC: 163 MG/DL (ref 50–200)
CO2 SERPL-SCNC: 28 MMOL/L (ref 21–32)
CREAT SERPL-MCNC: 1.03 MG/DL (ref 0.6–1.3)
EOSINOPHIL # BLD AUTO: 0.11 THOUSAND/ΜL (ref 0–0.61)
EOSINOPHIL NFR BLD AUTO: 2 % (ref 0–6)
ERYTHROCYTE [DISTWIDTH] IN BLOOD BY AUTOMATED COUNT: 12.9 % (ref 11.6–15.1)
GFR SERPL CREATININE-BSD FRML MDRD: 106 ML/MIN/1.73SQ M
GLUCOSE P FAST SERPL-MCNC: 100 MG/DL (ref 65–99)
HCT VFR BLD AUTO: 39.9 % (ref 36.5–49.3)
HDLC SERPL-MCNC: 31 MG/DL (ref 40–60)
HGB BLD-MCNC: 13.5 G/DL (ref 12–17)
IMM GRANULOCYTES # BLD AUTO: 0.01 THOUSAND/UL (ref 0–0.2)
IMM GRANULOCYTES NFR BLD AUTO: 0 % (ref 0–2)
LDLC SERPL CALC-MCNC: 111 MG/DL (ref 0–100)
LYMPHOCYTES # BLD AUTO: 1.93 THOUSANDS/ΜL (ref 0.6–4.47)
LYMPHOCYTES NFR BLD AUTO: 41 % (ref 14–44)
MCH RBC QN AUTO: 27.6 PG (ref 26.8–34.3)
MCHC RBC AUTO-ENTMCNC: 33.8 G/DL (ref 31.4–37.4)
MCV RBC AUTO: 82 FL (ref 82–98)
MONOCYTES # BLD AUTO: 0.45 THOUSAND/ΜL (ref 0.17–1.22)
MONOCYTES NFR BLD AUTO: 10 % (ref 4–12)
NEUTROPHILS # BLD AUTO: 2.19 THOUSANDS/ΜL (ref 1.85–7.62)
NEUTS SEG NFR BLD AUTO: 47 % (ref 43–75)
NONHDLC SERPL-MCNC: 132 MG/DL
NRBC BLD AUTO-RTO: 0 /100 WBCS
PLATELET # BLD AUTO: 231 THOUSANDS/UL (ref 149–390)
PMV BLD AUTO: 9.3 FL (ref 8.9–12.7)
POTASSIUM SERPL-SCNC: 3.4 MMOL/L (ref 3.5–5.3)
PROT SERPL-MCNC: 6.8 G/DL (ref 6.4–8.2)
RBC # BLD AUTO: 4.89 MILLION/UL (ref 3.88–5.62)
SODIUM SERPL-SCNC: 141 MMOL/L (ref 136–145)
T4 SERPL-MCNC: 6.9 UG/DL (ref 4.7–13.3)
TRIGL SERPL-MCNC: 106 MG/DL
TSH SERPL DL<=0.05 MIU/L-ACNC: 1.05 UIU/ML (ref 0.36–3.74)
WBC # BLD AUTO: 4.71 THOUSAND/UL (ref 4.31–10.16)

## 2019-07-09 PROCEDURE — 80053 COMPREHEN METABOLIC PANEL: CPT

## 2019-07-09 PROCEDURE — 84436 ASSAY OF TOTAL THYROXINE: CPT

## 2019-07-09 PROCEDURE — 84443 ASSAY THYROID STIM HORMONE: CPT

## 2019-07-09 PROCEDURE — 82248 BILIRUBIN DIRECT: CPT

## 2019-07-09 PROCEDURE — 36415 COLL VENOUS BLD VENIPUNCTURE: CPT

## 2019-07-09 PROCEDURE — 80061 LIPID PANEL: CPT

## 2019-07-09 PROCEDURE — 85025 COMPLETE CBC W/AUTO DIFF WBC: CPT

## 2019-07-10 DIAGNOSIS — B35.1 TINEA UNGUIUM: Primary | ICD-10-CM

## 2019-07-10 RX ORDER — TERBINAFINE HYDROCHLORIDE 250 MG/1
250 TABLET ORAL DAILY
Qty: 45 TABLET | Refills: 0 | Status: SHIPPED | OUTPATIENT
Start: 2019-07-10 | End: 2019-08-24

## 2019-07-25 ENCOUNTER — OFFICE VISIT (OUTPATIENT)
Dept: INTERNAL MEDICINE CLINIC | Age: 39
End: 2019-07-25
Payer: COMMERCIAL

## 2019-07-25 VITALS
SYSTOLIC BLOOD PRESSURE: 148 MMHG | TEMPERATURE: 97.3 F | WEIGHT: 261 LBS | OXYGEN SATURATION: 98 % | BODY MASS INDEX: 34.91 KG/M2 | DIASTOLIC BLOOD PRESSURE: 92 MMHG | HEART RATE: 74 BPM

## 2019-07-25 DIAGNOSIS — M25.531 WRIST PAIN, ACUTE, RIGHT: Primary | ICD-10-CM

## 2019-07-25 DIAGNOSIS — Z79.4 TYPE 2 DIABETES MELLITUS WITHOUT COMPLICATION, WITH LONG-TERM CURRENT USE OF INSULIN (HCC): ICD-10-CM

## 2019-07-25 DIAGNOSIS — G56.03 CARPAL TUNNEL SYNDROME, BILATERAL: ICD-10-CM

## 2019-07-25 DIAGNOSIS — E11.9 TYPE 2 DIABETES MELLITUS WITHOUT COMPLICATION, WITH LONG-TERM CURRENT USE OF INSULIN (HCC): ICD-10-CM

## 2019-07-25 PROCEDURE — 3725F SCREEN DEPRESSION PERFORMED: CPT | Performed by: PHYSICIAN ASSISTANT

## 2019-07-25 PROCEDURE — 1036F TOBACCO NON-USER: CPT | Performed by: PHYSICIAN ASSISTANT

## 2019-07-25 PROCEDURE — 99213 OFFICE O/P EST LOW 20 MIN: CPT | Performed by: PHYSICIAN ASSISTANT

## 2019-07-25 RX ORDER — MELOXICAM 15 MG/1
15 TABLET ORAL DAILY
Qty: 10 TABLET | Refills: 0 | Status: SHIPPED | OUTPATIENT
Start: 2019-07-25 | End: 2020-09-01 | Stop reason: SDUPTHER

## 2019-07-25 NOTE — LETTER
July 25, 2019     Patient: Katy Cooks   YOB: 1980   Date of Visit: 7/25/2019       To Whom it May Concern:    Katy Cooks is under my professional care  He was seen in my office on 7/25/2019  He may return to work on 7/26/19 on light duty  He may return to full duty 7/29/19  If you have any questions or concerns, please don't hesitate to call           Sincerely,          Shreya Pal PA-C        CC: No Recipients

## 2019-07-25 NOTE — PROGRESS NOTES
Assessment/Plan:         Diagnoses and all orders for this visit:    Wrist pain, acute, right  Comments:  pt is s/p carpal tunnel surgery in this hand  short course nsaid with food   f/u with ortho if needed   Orders:  -     meloxicam (MOBIC) 15 mg tablet; Take 1 tablet (15 mg total) by mouth daily    Carpal tunnel syndrome, bilateral  Comments:  pt instructed to f/u with ortho if continues     Type 2 diabetes mellitus without complication, with long-term current use of insulin ([facility] [address] )  Comments:  f/u as scheduled, continue low sugar diet         Pt instructed to avoid overuse of the hand and wrist for the next 2-3 days  To have light duty for work  May return to work on Monday  Pt instructed to f/u with ortho if swelling persists  Subjective:      Patient ID: Jerry Coughlin is a 45 y o  male  Pt with history of right carpal tunnel and trigger finger release 6 weeks ago, HTN, GERD, asthma, and DM2 reports for swollen right hand x 10 hours  He reports that he woke up at 3 am and noticed that it was swollen  He reports that it is sore and about a 7-8/10  It does not radiate and he denies numbness or tingling  No recent trauma but he works with truck tires and drills at work  He states prior to yesterday his hand was feeling well after work  He did take an aleve for this with improvement  Pt denies fever, chills, h/a  Denies any other joint or muscle pains  The following portions of the patient's history were reviewed and updated as appropriate: allergies, current medications, past family history, past medical history, past social history, past surgical history and problem list     Review of Systems   Constitutional: Negative for activity change, appetite change, chills and fever  HENT: Negative for congestion, sinus pressure, sinus pain and sore throat  Eyes: Negative for visual disturbance  Respiratory: Negative for cough, chest tightness, shortness of breath and wheezing      Cardiovascular: Negative for chest pain, palpitations and leg swelling  Gastrointestinal: Negative for nausea and vomiting  Musculoskeletal: Positive for arthralgias and myalgias  Negative for joint swelling  Skin: Positive for wound (healing areas from surgery last month)  Negative for rash  Neurological: Positive for numbness (in left hand)  Negative for dizziness, light-headedness and headaches  Objective:      /92 (BP Location: Left arm, Patient Position: Sitting, Cuff Size: Large)   Pulse 74   Temp (!) 97 3 °F (36 3 °C) (Tympanic)   Wt 118 kg (261 lb)   SpO2 98%   BMI 34 91 kg/m²          Physical Exam   Constitutional: He is oriented to person, place, and time  He appears well-developed and well-nourished  No distress  HENT:   Head: Normocephalic and atraumatic  Mouth/Throat: Oropharynx is clear and moist    Eyes: Pupils are equal, round, and reactive to light  EOM are normal    Neck: Normal range of motion  Cardiovascular: Normal rate, regular rhythm and normal heart sounds  No murmur heard  Pulmonary/Chest: Effort normal and breath sounds normal  No respiratory distress  He has no wheezes  He has no rales  Abdominal: Soft  Bowel sounds are normal  There is no tenderness  Musculoskeletal: Normal range of motion  He exhibits no edema or deformity  Lymphadenopathy:     He has no cervical adenopathy  Neurological: He is alert and oriented to person, place, and time  No cranial nerve deficit  Coordination normal    Skin: Skin is warm and dry  No rash noted  Psychiatric: He has a normal mood and affect   His behavior is normal

## 2019-08-02 ENCOUNTER — TRANSCRIBE ORDERS (OUTPATIENT)
Dept: ADMINISTRATIVE | Facility: HOSPITAL | Age: 39
End: 2019-08-02

## 2019-08-02 DIAGNOSIS — S33.5XXA LUMBAR SPRAIN, INITIAL ENCOUNTER: Primary | ICD-10-CM

## 2019-08-02 DIAGNOSIS — I10 ESSENTIAL HYPERTENSION: ICD-10-CM

## 2019-08-02 DIAGNOSIS — K21.9 GASTROESOPHAGEAL REFLUX DISEASE, ESOPHAGITIS PRESENCE NOT SPECIFIED: ICD-10-CM

## 2019-08-02 RX ORDER — AMLODIPINE BESYLATE 5 MG/1
TABLET ORAL
Qty: 30 TABLET | Refills: 0 | OUTPATIENT
Start: 2019-08-02

## 2019-08-02 RX ORDER — PANTOPRAZOLE SODIUM 40 MG/1
TABLET, DELAYED RELEASE ORAL
Qty: 30 TABLET | Refills: 0 | OUTPATIENT
Start: 2019-08-02

## 2019-08-06 ENCOUNTER — TELEPHONE (OUTPATIENT)
Dept: INTERNAL MEDICINE CLINIC | Age: 39
End: 2019-08-06

## 2019-08-06 NOTE — TELEPHONE ENCOUNTER
Mayte Dad,    Patient would like to speak with you regarding medication that he is taking from his last visit and regarding the surgery that he had done      Please call him back on his mobile phone

## 2019-08-06 NOTE — TELEPHONE ENCOUNTER
Called pt  He needs a letter from our office stating that he has no complaints of hand or wrist concerns prior to starting his new job  Letter is in his chart  Please print and contact pt to let him know it is ready for  in our office  Thanks!   Regina Dia

## 2019-08-06 NOTE — TELEPHONE ENCOUNTER
Called the patient to let him know the letter Is ready to  today at the bath office  Look at it before leaving the office today

## 2019-08-06 NOTE — LETTER
August 6, 2019     Patient: Ray Cantor   YOB: 1980   Date of Visit: 8/6/2019       To Whom it May Concern:    Ray Cantor is under my professional care  The patient established care with our office on March 2, 2018  He has been followed routinely by our office since that date The date of his first visit pertaining to any concerns for pain, numbness or tingling in his hands was on 5/6/2019  He was then seen by a hand specialist on 5/9/2019  The patient underwent further neurological testing on 5/14/2019 which confirmed his diagnosis of carpal tunnel in bilateral wrists/hands  If you have any questions or concerns, please don't hesitate to call           Sincerely,          ETELVINA Trujillo       CC: No Recipients

## 2019-08-29 ENCOUNTER — TELEPHONE (OUTPATIENT)
Dept: INTERNAL MEDICINE CLINIC | Age: 39
End: 2019-08-29
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
## 2019-08-30 NOTE — TELEPHONE ENCOUNTER
Covering mario fulton - please inform pt she is out of office and was yesterday as well    Will be back in on tuesday

## 2019-09-03 NOTE — TELEPHONE ENCOUNTER
I was out of the office last Friday 8/30/19 and therefore, because of the holiday yesterday, I just received your reply today, 9/3/19  Please forward any requests or follow-up calls to the clerical bin in the future so any issue can be addressed promptly  Thank you

## 2019-09-03 NOTE — TELEPHONE ENCOUNTER
Called and spoke to pt  I asked him to drop the form off at the office for me to look at   Will wait to see the form

## 2019-09-03 NOTE — TELEPHONE ENCOUNTER
Noted below  Task sent to [facility],  ENA bin while she was out of office  Needs to be addressed by her when she is back in office

## 2019-09-17 RX ORDER — ARIPIPRAZOLE 5 MG/1
TABLET ORAL
Refills: 1 | COMMUNITY
Start: 2019-08-10 | End: 2020-01-24 | Stop reason: CLARIF

## 2019-09-18 ENCOUNTER — OFFICE VISIT (OUTPATIENT)
Dept: INTERNAL MEDICINE CLINIC | Facility: CLINIC | Age: 39
End: 2019-09-18
Payer: COMMERCIAL

## 2019-09-18 VITALS
WEIGHT: 262.2 LBS | SYSTOLIC BLOOD PRESSURE: 150 MMHG | HEART RATE: 66 BPM | DIASTOLIC BLOOD PRESSURE: 100 MMHG | OXYGEN SATURATION: 99 % | BODY MASS INDEX: 35.07 KG/M2 | TEMPERATURE: 97.8 F

## 2019-09-18 DIAGNOSIS — I10 ESSENTIAL HYPERTENSION: Primary | ICD-10-CM

## 2019-09-18 DIAGNOSIS — M79.641 RIGHT HAND PAIN: ICD-10-CM

## 2019-09-18 DIAGNOSIS — E11.9 TYPE 2 DIABETES MELLITUS WITHOUT COMPLICATION, WITH LONG-TERM CURRENT USE OF INSULIN (HCC): ICD-10-CM

## 2019-09-18 DIAGNOSIS — R06.2 WHEEZING: ICD-10-CM

## 2019-09-18 DIAGNOSIS — G56.03 BILATERAL CARPAL TUNNEL SYNDROME: ICD-10-CM

## 2019-09-18 DIAGNOSIS — F43.20 ADULT SITUATIONAL STRESS DISORDER: ICD-10-CM

## 2019-09-18 DIAGNOSIS — Z79.4 TYPE 2 DIABETES MELLITUS WITHOUT COMPLICATION, WITH LONG-TERM CURRENT USE OF INSULIN (HCC): ICD-10-CM

## 2019-09-18 PROCEDURE — 99214 OFFICE O/P EST MOD 30 MIN: CPT | Performed by: NURSE PRACTITIONER

## 2019-09-18 RX ORDER — ALBUTEROL SULFATE 90 UG/1
2 AEROSOL, METERED RESPIRATORY (INHALATION) EVERY 6 HOURS PRN
Qty: 1 INHALER | Refills: 5 | Status: SHIPPED | OUTPATIENT
Start: 2019-09-18 | End: 2020-02-27 | Stop reason: SDUPTHER

## 2019-09-18 RX ORDER — LISINOPRIL 5 MG/1
TABLET ORAL
Refills: 2 | COMMUNITY
Start: 2019-08-30 | End: 2019-09-18 | Stop reason: ALTCHOICE

## 2019-09-18 RX ORDER — LOSARTAN POTASSIUM 25 MG/1
25 TABLET ORAL DAILY
Qty: 30 TABLET | Refills: 2 | Status: SHIPPED | OUTPATIENT
Start: 2019-09-18 | End: 2020-02-27 | Stop reason: SDUPTHER

## 2019-09-18 NOTE — PATIENT INSTRUCTIONS
Continue same medications     Add on losartan 25mg once a day to current medications     Start inhaler 2 puffs every 6 hours as needed for shortness of breath or wheezing    follow up in 3 weeks      Follow up with dr Mary Cummins for right hand pain
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
## 2019-09-18 NOTE — ASSESSMENT & PLAN NOTE
Lab Results   Component Value Date    HGBA1C 5 5 05/16/2019       No results for input(s): POCGLU in the last 72 hours      Blood Sugar Average: Last 72 hrs:     Diet controlled

## 2019-09-23 ENCOUNTER — TELEPHONE (OUTPATIENT)
Dept: INTERNAL MEDICINE CLINIC | Facility: CLINIC | Age: 39
End: 2019-09-23

## 2019-09-23 NOTE — TELEPHONE ENCOUNTER
Patient called [facility] to speak to [place] its regarding the forms you filled out for him  He wants to speak to you about them

## 2019-09-23 NOTE — TELEPHONE ENCOUNTER
Pt called to say that his  told him a physician needs to fill his forms out  I explained that I am a nurse practitioner and I filled them out  He has not been seen by a physician in our office for this concern  I explained that he has only seen [facility]

## 2019-10-08 ENCOUNTER — TELEPHONE (OUTPATIENT)
Dept: INTERNAL MEDICINE CLINIC | Age: 39
End: 2019-10-08

## 2019-10-08 DIAGNOSIS — K76.9 LIVER LESION, RIGHT LOBE: Primary | ICD-10-CM

## 2019-10-08 NOTE — TELEPHONE ENCOUNTER
Received in the mail the CT scan of the Abdomen and Pelvis that this patient had done in July of 2019  Stated that it had significant findings and recommended him to get a non emergent liver MRI or dedicated Liver Protocol CT on this patient      Should this patient get this test done

## 2019-10-09 PROBLEM — K76.9 LIVER LESION, RIGHT LOBE: Status: ACTIVE | Noted: 2019-10-09

## 2019-10-09 NOTE — TELEPHONE ENCOUNTER
I called the patient and he denies any form of met in the body including medical procedures or injuries

## 2019-10-09 NOTE — TELEPHONE ENCOUNTER
MRI ordered   Please let pt know this is non emergent  He can schedule at his convenience     thanks

## 2019-10-09 NOTE — TELEPHONE ENCOUNTER
I spoke with patient and wants to know what the CT scan showed and why he should go for the MRI    Can someone please call the patient and let him know this

## 2019-10-09 NOTE — TELEPHONE ENCOUNTER
I can add order for MRI    Please check with pt first to see if he has any metallic implants   Let me know and then I will place order

## 2019-10-10 NOTE — TELEPHONE ENCOUNTER
Called patient and explained findings on CT scan and reason for follow-up MRI    All questions answered

## 2019-10-14 ENCOUNTER — HOSPITAL ENCOUNTER (OUTPATIENT)
Dept: MRI IMAGING | Facility: HOSPITAL | Age: 39
Discharge: HOME | End: 2019-10-14
Payer: COMMERCIAL

## 2019-10-14 DIAGNOSIS — M54.59 ACUTE MECHANICAL LOW BACK PAIN, DURATION < 6 WEEKS: ICD-10-CM

## 2019-10-14 DIAGNOSIS — S33.5XXA LUMBAR SPRAIN, INITIAL ENCOUNTER: ICD-10-CM

## 2019-10-14 PROCEDURE — 72148 MRI LUMBAR SPINE W/O DYE: CPT

## 2019-11-15 ENCOUNTER — TELEPHONE (OUTPATIENT)
Dept: INTERNAL MEDICINE CLINIC | Age: 39
End: 2019-11-15

## 2019-11-19 ENCOUNTER — TELEPHONE (OUTPATIENT)
Dept: INTERNAL MEDICINE CLINIC | Age: 39
End: 2019-11-19

## 2019-11-19 NOTE — TELEPHONE ENCOUNTER
Spoke to patient and relayed that MRI was approved and that he should reschedule it at the [address] [place] [address] location on [facility]

## 2020-01-24 ENCOUNTER — OFFICE VISIT (OUTPATIENT)
Dept: INTERNAL MEDICINE CLINIC | Age: 40
End: 2020-01-24
Payer: COMMERCIAL

## 2020-01-24 ENCOUNTER — TELEPHONE (OUTPATIENT)
Dept: OTHER | Facility: OTHER | Age: 40
End: 2020-01-24

## 2020-01-24 VITALS
BODY MASS INDEX: 34.22 KG/M2 | DIASTOLIC BLOOD PRESSURE: 88 MMHG | HEART RATE: 78 BPM | SYSTOLIC BLOOD PRESSURE: 140 MMHG | TEMPERATURE: 97.6 F | WEIGHT: 255.8 LBS | OXYGEN SATURATION: 98 %

## 2020-01-24 DIAGNOSIS — Z79.4 TYPE 2 DIABETES MELLITUS WITHOUT COMPLICATION, WITH LONG-TERM CURRENT USE OF INSULIN (HCC): Primary | ICD-10-CM

## 2020-01-24 DIAGNOSIS — I10 ESSENTIAL HYPERTENSION: ICD-10-CM

## 2020-01-24 DIAGNOSIS — E11.9 TYPE 2 DIABETES MELLITUS WITHOUT COMPLICATION, WITH LONG-TERM CURRENT USE OF INSULIN (HCC): Primary | ICD-10-CM

## 2020-01-24 DIAGNOSIS — K52.9 GASTROENTERITIS: ICD-10-CM

## 2020-01-24 PROCEDURE — 1036F TOBACCO NON-USER: CPT | Performed by: NURSE PRACTITIONER

## 2020-01-24 PROCEDURE — 99213 OFFICE O/P EST LOW 20 MIN: CPT | Performed by: NURSE PRACTITIONER

## 2020-01-24 RX ORDER — ONDANSETRON 4 MG/1
4 TABLET, FILM COATED ORAL EVERY 8 HOURS PRN
Qty: 20 TABLET | Refills: 0 | Status: SHIPPED | OUTPATIENT
Start: 2020-01-24 | End: 2020-09-01 | Stop reason: ALTCHOICE

## 2020-01-24 RX ORDER — ARIPIPRAZOLE 10 MG/1
TABLET ORAL
COMMUNITY
Start: 2019-11-09 | End: 2020-01-24 | Stop reason: CLARIF

## 2020-01-24 NOTE — ASSESSMENT & PLAN NOTE
Patient's blood pressure currently uncontrolled, patient states he has not been taking his blood pressure medications  Patient is to restart his amlodipine 10 mg tablet, losartan 25 mg tablet and follow-up with our office in two weeks  Continue current regimen -   Continue to monitor blood pressure at home  Goal BP is < 130/80  Contact our office for consistent elevations  Recommend low sodium diet  Exercise 30 minutes 5 times a week as tolerated    Recommend yearly eye exam

## 2020-01-24 NOTE — PROGRESS NOTES
Assessment/Plan:    Essential hypertension    Patient's blood pressure currently uncontrolled, patient states he has not been taking his blood pressure medications  Patient is to restart his amlodipine 10 mg tablet, losartan 25 mg tablet and follow-up with our office in two weeks  Continue current regimen -   Continue to monitor blood pressure at home  Goal BP is < 130/80  Contact our office for consistent elevations  Recommend low sodium diet  Exercise 30 minutes 5 times a week as tolerated  Recommend yearly eye exam       Gastroenteritis  Symptoms are likely viral in origin and self limiting  Do not use antidiarrheals  Maintain adequate rest   Reviewed importance of adequate fluid intake (small frequent sips) to prevent dehydration  Encouraged use of electrolyte containing sports drinks (Gatorade or Powerade) mixed with water and take small frequent sips throughout the day  Early refeeding can reduce illness duration and improve outcomes  Encourage small bites, bland diet  Avoid fatty foods, greasy foods, and dairy which all may worsen symptoms  Discussed hand hygiene to prevent spread  Minimize close contact with other individuals while symptomatic  Gradual advancement of diet from bland foods to regular diet  Call the office if symptoms worsen or do not improve  Discussed red flag and ER precautions which need immediate eval and treat  BMI Counseling: Body mass index is 34 22 kg/m²  The BMI is above normal  Nutrition recommendations include decreasing portion sizes, encouraging healthy choices of fruits and vegetables, decreasing fast food intake, consuming healthier snacks, limiting drinks that contain sugar, moderation in carbohydrate intake, increasing intake of lean protein, reducing intake of saturated and trans fat and reducing intake of cholesterol  Exercise recommendations include moderate physical activity 150 minutes/week and exercising 3-5 times per week   Patient referred to PCP due to patient being overweight  Diagnoses and all orders for this visit:    Type 2 diabetes mellitus without complication, with long-term current use of insulin (HCC)  -     Hemoglobin A1C; Future  -     Microalbumin / creatinine urine ratio    Gastroenteritis  -     ondansetron (ZOFRAN) 4 mg tablet; Take 1 tablet (4 mg total) by mouth every 8 (eight) hours as needed for nausea or vomiting    Essential hypertension  -     CBC and differential  -     Comprehensive metabolic panel; Future  -     Lipid panel    Other orders  -     Cancel: Ambulatory referral to Ophthalmology; Future  -     Discontinue: ARIPiprazole (ABILIFY) 10 mg tablet          Subjective:      Patient ID: Jerica Landon is a 44 y o  male  Patient presents today with complaints of nausea and vomiting  Patient states that he started feeling sick yesterday afternoon  Patient states he had  Shrimp lo mein around 9:00 a m  That morning  Patient denies fevers and chills  He reports the vomiting has stopped at this point however continues to feel mildly nauseous  Nausea   This is a new problem  Episode onset: 1 day  The problem occurs intermittently  The problem has been unchanged  Associated symptoms include nausea and vomiting (food, 5-6 times)  Pertinent negatives include no abdominal pain, arthralgias, chest pain, chills, congestion, coughing, diaphoresis, fever, headaches, neck pain, numbness, rash, sore throat or weakness  The treatment provided no relief  The following portions of the patient's history were reviewed and updated as appropriate: allergies, current medications, past family history, past medical history, past social history, past surgical history and problem list     Review of Systems   Constitutional: Positive for appetite change (decreased)  Negative for activity change, chills, diaphoresis and fever     HENT: Negative for congestion, ear discharge, ear pain, postnasal drip, rhinorrhea, sinus pressure, sinus pain and sore throat  Bloody nose   Eyes: Negative for pain, discharge, itching and visual disturbance  Respiratory: Negative for cough, chest tightness, shortness of breath and wheezing  Cardiovascular: Negative for chest pain, palpitations and leg swelling  Gastrointestinal: Positive for diarrhea (2 episode), nausea and vomiting (food, 5-6 times)  Negative for abdominal pain and constipation  Endocrine: Negative for polydipsia, polyphagia and polyuria  Genitourinary: Negative for difficulty urinating, dysuria and urgency  Musculoskeletal: Negative for arthralgias, back pain and neck pain  Skin: Negative for rash and wound  Neurological: Negative for dizziness, weakness, numbness and headaches           Past Medical History:   Diagnosis Date    Adult situational stress disorder     Asthma     Illness induced    Diabetes mellitus [address] [address] )     Diet Controlled    GERD (gastroesophageal reflux disease)     Hypertension     Type 2 diabetes mellitus without complication, with long-term current use of insulin ([facility]) 3/2/2018         Current Outpatient Medications:     albuterol (PROVENTIL HFA,VENTOLIN HFA) 90 mcg/act inhaler, Inhale 2 puffs every 6 (six) hours as needed for wheezing or shortness of breath, Disp: 1 Inhaler, Rfl: 5    amLODIPine (NORVASC) 10 mg tablet, Take 1 tablet (10 mg total) by mouth daily, Disp: 30 tablet, Rfl: 3    losartan (COZAAR) 25 mg tablet, Take 1 tablet (25 mg total) by mouth daily, Disp: 30 tablet, Rfl: 2    bismuth subsalicylate (PEPTO BISMOL) 524 mg/30 mL oral suspension, Take 15 mL by mouth every 6 (six) hours as needed for indigestion, Disp: , Rfl:     dicyclomine (BENTYL) 20 mg tablet, Take 1 tablet (20 mg total) by mouth 2 (two) times a day (Patient not taking: Reported on 1/24/2020), Disp: 20 tablet, Rfl: 0    famotidine (PEPCID) 40 MG tablet, Take 1 tablet (40 mg total) by mouth daily (Patient taking differently: Take 40 mg by mouth as needed ), Disp: 30 tablet, Rfl: 3    meloxicam (MOBIC) 15 mg tablet, Take 1 tablet (15 mg total) by mouth daily (Patient not taking: Reported on 1/24/2020), Disp: 10 tablet, Rfl: 0    ondansetron (ZOFRAN) 4 mg tablet, Take 1 tablet (4 mg total) by mouth every 8 (eight) hours as needed for nausea or vomiting, Disp: 20 tablet, Rfl: 0    traMADol (ULTRAM) 50 mg tablet, Take 1 tablet (50 mg total) by mouth every 6 (six) hours as needed for moderate pain (Patient not taking: Reported on 1/24/2020), Disp: 10 tablet, Rfl: 0    Allergies   Allergen Reactions    Dust Mite Extract Other (See Comments) and Nasal Congestion     Itchy eyes    Pollen Extract Other (See Comments) and Nasal Congestion     Itchy eyes       Social History   Past Surgical History:   Procedure Laterality Date    NO PAST SURGERIES      ME INCISE FINGER TENDON SHEATH Right 6/11/2019    Procedure: RING TRIGGER FINGER RELEASE;  Surgeon: Shonda Garcia MD;  [facility];  Service: Orthopedics    ME REVISE MEDIAN N/CARPAL TUNNEL SURG Right 6/11/2019    Procedure: CARPAL TUNNEL RELEASE;  Surgeon: Shonda Garcia MD;  [facility];  Service: Orthopedics     Family History   Problem Relation Age of Onset    Asthma Mother     Diabetes Mother     Hypertension Mother     Diabetes Father     Hypertension Father        Objective:  /88 (BP Location: Left arm, Patient Position: Sitting, Cuff Size: Large)   Pulse 78   Temp 97 6 °F (36 4 °C) (Tympanic)   Wt 116 kg (255 lb 12 8 oz)   SpO2 98%   BMI 34 22 kg/m²     No results found for this or any previous visit (from the past 1344 hour(s))  Physical Exam   Constitutional: He is oriented to person, place, and time  He appears well-developed and well-nourished  No distress  HENT:   Head: Normocephalic and atraumatic     Right Ear: External ear normal    Left Ear: External ear normal    Nose: Nose normal    Mouth/Throat: Oropharynx is clear and moist  No oropharyngeal exudate  Eyes: Pupils are equal, round, and reactive to light  Conjunctivae and EOM are normal  Right eye exhibits no discharge  Left eye exhibits no discharge  Neck: Normal range of motion  Neck supple  No thyromegaly present  Cardiovascular: Normal rate, regular rhythm, normal heart sounds and intact distal pulses  Exam reveals no gallop and no friction rub  No murmur heard  Pulmonary/Chest: Effort normal and breath sounds normal  No stridor  No respiratory distress  He has no wheezes  He has no rales  Abdominal: Soft  Bowel sounds are normal  He exhibits no distension  There is no tenderness  Lymphadenopathy:     He has no cervical adenopathy  Neurological: He is alert and oriented to person, place, and time  Skin: Skin is warm and dry  No rash noted  He is not diaphoretic  No erythema  Psychiatric: He has a normal mood and affect   His behavior is normal  Judgment and thought content normal

## 2020-01-24 NOTE — TELEPHONE ENCOUNTER
Pt wants a sick appt  Today with [facility]  He is willing to go to any office where she is today  Pt may have the Stomach bug  He has been vomiting since yesterday  He did not want a triage, just an appt to see her only

## 2020-02-26 DIAGNOSIS — I10 ESSENTIAL HYPERTENSION: ICD-10-CM

## 2020-02-26 RX ORDER — AMLODIPINE BESYLATE 10 MG/1
10 TABLET ORAL DAILY
Qty: 30 TABLET | Refills: 3 | Status: CANCELLED | OUTPATIENT
Start: 2020-02-26

## 2020-02-26 NOTE — TELEPHONE ENCOUNTER
MED: Losartan 25mg  SUPPLY: 90Day  PHARMACY: [facility]   PATIENT PHONE #: [phone]  LAST OV: 9/18/2019    MED: Ondansetron 4mg  SUPPLY: 90Day  PHARMACY: [facility]   PATIENT PHONE #: [phone]  LAST OV: 9/18/2019    MED: Meloxicam 15mg  SUPPLY: 90Day  PHARMACY: [facility]   PATIENT PHONE #: [phone]  LAST OV: 9/18/2019    MED: Amlodipine 10mg  SUPPLY: 90Day  PHARMACY: [facility]   PATIENT PHONE #: [phone]  LAST OV: 9/18/2019

## 2020-02-26 NOTE — TELEPHONE ENCOUNTER
Pt will be seen at [facility], 02/27/2020  He no showed last appt  He needs to be seen for refills

## 2020-02-27 ENCOUNTER — APPOINTMENT (OUTPATIENT)
Dept: LAB | Age: 40
End: 2020-02-27
Payer: COMMERCIAL

## 2020-02-27 ENCOUNTER — OFFICE VISIT (OUTPATIENT)
Dept: INTERNAL MEDICINE CLINIC | Age: 40
End: 2020-02-27
Payer: COMMERCIAL

## 2020-02-27 VITALS
HEART RATE: 63 BPM | SYSTOLIC BLOOD PRESSURE: 130 MMHG | TEMPERATURE: 97.2 F | DIASTOLIC BLOOD PRESSURE: 68 MMHG | WEIGHT: 257 LBS | OXYGEN SATURATION: 98 % | BODY MASS INDEX: 34.38 KG/M2

## 2020-02-27 DIAGNOSIS — I10 ESSENTIAL HYPERTENSION: ICD-10-CM

## 2020-02-27 DIAGNOSIS — K21.9 GASTROESOPHAGEAL REFLUX DISEASE, ESOPHAGITIS PRESENCE NOT SPECIFIED: Primary | ICD-10-CM

## 2020-02-27 DIAGNOSIS — R74.8 ELEVATED CK: ICD-10-CM

## 2020-02-27 DIAGNOSIS — R73.01 IMPAIRED FASTING GLUCOSE: ICD-10-CM

## 2020-02-27 DIAGNOSIS — Z11.3 ROUTINE SCREENING FOR STI (SEXUALLY TRANSMITTED INFECTION): ICD-10-CM

## 2020-02-27 DIAGNOSIS — Z79.4 TYPE 2 DIABETES MELLITUS WITHOUT COMPLICATION, WITH LONG-TERM CURRENT USE OF INSULIN (HCC): ICD-10-CM

## 2020-02-27 DIAGNOSIS — E11.9 TYPE 2 DIABETES MELLITUS WITHOUT COMPLICATION, WITH LONG-TERM CURRENT USE OF INSULIN (HCC): ICD-10-CM

## 2020-02-27 DIAGNOSIS — G56.02 LEFT CARPAL TUNNEL SYNDROME: ICD-10-CM

## 2020-02-27 DIAGNOSIS — R06.2 WHEEZING: ICD-10-CM

## 2020-02-27 LAB
ALBUMIN SERPL BCP-MCNC: 3.9 G/DL (ref 3.5–5)
ALP SERPL-CCNC: 77 U/L (ref 46–116)
ALT SERPL W P-5'-P-CCNC: 33 U/L (ref 12–78)
ANION GAP SERPL CALCULATED.3IONS-SCNC: 3 MMOL/L (ref 4–13)
AST SERPL W P-5'-P-CCNC: 14 U/L (ref 5–45)
BILIRUB SERPL-MCNC: 0.84 MG/DL (ref 0.2–1)
BUN SERPL-MCNC: 14 MG/DL (ref 5–25)
CALCIUM SERPL-MCNC: 8.9 MG/DL (ref 8.3–10.1)
CHLORIDE SERPL-SCNC: 111 MMOL/L (ref 100–108)
CK MB SERPL-MCNC: 1.8 NG/ML (ref 0–5)
CK MB SERPL-MCNC: <1 % (ref 0–2.5)
CK SERPL-CCNC: 337 U/L (ref 39–308)
CO2 SERPL-SCNC: 28 MMOL/L (ref 21–32)
CREAT SERPL-MCNC: 1.18 MG/DL (ref 0.6–1.3)
EST. AVERAGE GLUCOSE BLD GHB EST-MCNC: 105 MG/DL
GFR SERPL CREATININE-BSD FRML MDRD: 77 ML/MIN/1.73SQ M
GLUCOSE P FAST SERPL-MCNC: 94 MG/DL (ref 65–99)
HBA1C MFR BLD: 5.3 %
HCV AB SER QL: NORMAL
POTASSIUM SERPL-SCNC: 3.8 MMOL/L (ref 3.5–5.3)
PROT SERPL-MCNC: 7.4 G/DL (ref 6.4–8.2)
RPR SER QL: NORMAL
SODIUM SERPL-SCNC: 142 MMOL/L (ref 136–145)

## 2020-02-27 PROCEDURE — 99213 OFFICE O/P EST LOW 20 MIN: CPT | Performed by: PHYSICIAN ASSISTANT

## 2020-02-27 PROCEDURE — 82553 CREATINE MB FRACTION: CPT

## 2020-02-27 PROCEDURE — 36415 COLL VENOUS BLD VENIPUNCTURE: CPT

## 2020-02-27 PROCEDURE — 87389 HIV-1 AG W/HIV-1&-2 AB AG IA: CPT

## 2020-02-27 PROCEDURE — 86696 HERPES SIMPLEX TYPE 2 TEST: CPT

## 2020-02-27 PROCEDURE — 86592 SYPHILIS TEST NON-TREP QUAL: CPT

## 2020-02-27 PROCEDURE — 83036 HEMOGLOBIN GLYCOSYLATED A1C: CPT

## 2020-02-27 PROCEDURE — 86803 HEPATITIS C AB TEST: CPT

## 2020-02-27 PROCEDURE — 3075F SYST BP GE 130 - 139MM HG: CPT | Performed by: PHYSICIAN ASSISTANT

## 2020-02-27 PROCEDURE — 3044F HG A1C LEVEL LT 7.0%: CPT | Performed by: PHYSICIAN ASSISTANT

## 2020-02-27 PROCEDURE — 82550 ASSAY OF CK (CPK): CPT

## 2020-02-27 PROCEDURE — 3078F DIAST BP <80 MM HG: CPT | Performed by: PHYSICIAN ASSISTANT

## 2020-02-27 PROCEDURE — 4010F ACE/ARB THERAPY RXD/TAKEN: CPT | Performed by: PHYSICIAN ASSISTANT

## 2020-02-27 PROCEDURE — 80053 COMPREHEN METABOLIC PANEL: CPT

## 2020-02-27 PROCEDURE — 1036F TOBACCO NON-USER: CPT | Performed by: PHYSICIAN ASSISTANT

## 2020-02-27 PROCEDURE — 86695 HERPES SIMPLEX TYPE 1 TEST: CPT

## 2020-02-27 RX ORDER — AMLODIPINE BESYLATE 10 MG/1
10 TABLET ORAL DAILY
Qty: 30 TABLET | Refills: 5 | Status: SHIPPED | OUTPATIENT
Start: 2020-02-27 | End: 2020-09-01 | Stop reason: SDUPTHER

## 2020-02-27 RX ORDER — ALBUTEROL SULFATE 90 UG/1
2 AEROSOL, METERED RESPIRATORY (INHALATION) EVERY 6 HOURS PRN
Qty: 1 INHALER | Refills: 5 | Status: SHIPPED | OUTPATIENT
Start: 2020-02-27 | End: 2020-09-01 | Stop reason: SDUPTHER

## 2020-02-27 RX ORDER — FAMOTIDINE 40 MG/1
40 TABLET, FILM COATED ORAL DAILY
Qty: 30 TABLET | Refills: 5 | Status: SHIPPED | OUTPATIENT
Start: 2020-02-27 | End: 2020-09-01

## 2020-02-27 RX ORDER — LOSARTAN POTASSIUM 25 MG/1
25 TABLET ORAL DAILY
Qty: 30 TABLET | Refills: 5 | Status: SHIPPED | OUTPATIENT
Start: 2020-02-27 | End: 2020-09-01 | Stop reason: SDUPTHER

## 2020-02-27 NOTE — PROGRESS NOTES
Assessment/Plan:         Diagnoses and all orders for this visit:    Gastroesophageal reflux disease, esophagitis presence not specified  Comments:  famotidine prn  avoid spicy acidic foods  Orders:  -     famotidine (PEPCID) 40 MG tablet; Take 1 tablet (40 mg total) by mouth daily    Wheezing  -     albuterol (PROVENTIL HFA,VENTOLIN HFA) 90 mcg/act inhaler; Inhale 2 puffs every 6 (six) hours as needed for wheezing or shortness of breath    Essential hypertension  Comments:  improved, continue meds as outlined  Orders:  -     amLODIPine (NORVASC) 10 mg tablet; Take 1 tablet (10 mg total) by mouth daily  -     losartan (COZAAR) 25 mg tablet; Take 1 tablet (25 mg total) by mouth daily    Routine screening for STI (sexually transmitted infection)  Comments:  no known exposures, wants screening  Orders:  -     HIV 1/2 AG-AB combo; Future  -     RPR; Future  -     Hepatitis C antibody; Future  -     Herpes I/II IgG CESAR w Reflex to HSV-2; Future    Left carpal tunnel syndrome  Comments:  continues with pain in L wrist, had R surgery in 2019 with good results  Orders:  -     Ambulatory referral to Hand Surgery; Future    Impaired fasting glucose  Comments:  pt is going for lab now          Subjective:      Patient ID: Flynn Mendez is a 44 y o  male  Pt presents for f/u for gerd, impaired fbs, htn  bp improved, pt has been taking his meds as ordered, does not check BS at home  Going for labs now  - pt fasted    Pt c/o ongoing pain in L wrist, states he was supposed to have f/u w/ hand surgery regarding sx for L wrist, had R wrist in 2019 with good results, needs to make f/u there       C/o gerd sx - wants to resume famotidine, if takes this prn he has no problems with gerd        The following portions of the patient's history were reviewed and updated as appropriate: allergies, current medications, past family history, past medical history, past social history, past surgical history and problem list     Review of Systems   Constitutional: Negative for appetite change, chills, fatigue and fever  HENT: Negative for congestion, hearing loss and postnasal drip  Eyes: Negative for itching and visual disturbance  Respiratory: Negative for cough, shortness of breath and wheezing  Cardiovascular: Negative for chest pain, palpitations and leg swelling  Gastrointestinal: Negative for abdominal pain, constipation, diarrhea and nausea  Endocrine: Negative for polyuria  Genitourinary: Negative for dysuria, hematuria and urgency  Musculoskeletal: Positive for arthralgias  Negative for back pain, gait problem and myalgias  Skin: Negative for rash  Allergic/Immunologic: Negative for environmental allergies  Neurological: Negative for dizziness, speech difficulty, light-headedness and headaches  Hematological: Does not bruise/bleed easily  Psychiatric/Behavioral: Negative for sleep disturbance  The patient is not nervous/anxious            Past Medical History:   Diagnosis Date    Adult situational stress disorder     Asthma     Illness induced    Diabetes mellitus ([facility] [address] )     Diet Controlled    GERD (gastroesophageal reflux disease)     Hypertension     Type 2 diabetes mellitus without complication, with long-term current use of insulin ([facility]) 3/2/2018         Current Outpatient Medications:     albuterol (PROVENTIL HFA,VENTOLIN HFA) 90 mcg/act inhaler, Inhale 2 puffs every 6 (six) hours as needed for wheezing or shortness of breath, Disp: 1 Inhaler, Rfl: 5    amLODIPine (NORVASC) 10 mg tablet, Take 1 tablet (10 mg total) by mouth daily, Disp: 30 tablet, Rfl: 5    bismuth subsalicylate (PEPTO BISMOL) 524 mg/30 mL oral suspension, Take 15 mL by mouth every 6 (six) hours as needed for indigestion, Disp: , Rfl:     famotidine (PEPCID) 40 MG tablet, Take 1 tablet (40 mg total) by mouth daily, Disp: 30 tablet, Rfl: 5    losartan (COZAAR) 25 mg tablet, Take 1 tablet (25 mg total) by mouth daily, Disp: 30 tablet, Rfl: 5    dicyclomine (BENTYL) 20 mg tablet, Take 1 tablet (20 mg total) by mouth 2 (two) times a day (Patient not taking: Reported on 1/24/2020), Disp: 20 tablet, Rfl: 0    meloxicam (MOBIC) 15 mg tablet, Take 1 tablet (15 mg total) by mouth daily (Patient not taking: Reported on 1/24/2020), Disp: 10 tablet, Rfl: 0    ondansetron (ZOFRAN) 4 mg tablet, Take 1 tablet (4 mg total) by mouth every 8 (eight) hours as needed for nausea or vomiting (Patient not taking: Reported on 2/27/2020), Disp: 20 tablet, Rfl: 0    traMADol (ULTRAM) 50 mg tablet, Take 1 tablet (50 mg total) by mouth every 6 (six) hours as needed for moderate pain (Patient not taking: Reported on 1/24/2020), Disp: 10 tablet, Rfl: 0    Allergies   Allergen Reactions    Dust Mite Extract Other (See Comments) and Nasal Congestion     Itchy eyes    Pollen Extract Other (See Comments) and Nasal Congestion     Itchy eyes       Social History   Past Surgical History:   Procedure Laterality Date    NO PAST SURGERIES      NE INCISE FINGER TENDON SHEATH Right 6/11/2019    Procedure: RING TRIGGER FINGER RELEASE;  Surgeon: Vielka Yoo MD;  [facility];  Service: Orthopedics    NE REVISE MEDIAN N/CARPAL TUNNEL SURG Right 6/11/2019    Procedure: CARPAL TUNNEL RELEASE;  Surgeon: Vielka Yoo MD;  [facility];  Service: Orthopedics     Family History   Problem Relation Age of Onset    Asthma Mother     Diabetes Mother     Hypertension Mother     Diabetes Father     Hypertension Father        Objective:  /68 (BP Location: Left arm, Patient Position: Sitting, Cuff Size: Standard)   Pulse 63   Temp (!) 97 2 °F (36 2 °C) (Tympanic)   Wt 117 kg (257 lb) Comment: shoes on  SpO2 98%   BMI 34 38 kg/m²        Physical Exam   Constitutional: He is oriented to person, place, and time  He appears well-developed and well-nourished  No distress  HENT:   Head: Normocephalic and atraumatic     Right Ear: External ear normal    Left Ear: External ear normal    Eyes: Pupils are equal, round, and reactive to light  Conjunctivae and EOM are normal  Right eye exhibits no discharge  Left eye exhibits no discharge  Neck: Normal range of motion  Neck supple  Cardiovascular: Normal rate, regular rhythm and normal heart sounds  Pulmonary/Chest: Effort normal  He has wheezes (occ exp wheeze b/l bases)  He has no rales  Abdominal: Soft  Bowel sounds are normal    Musculoskeletal: Normal range of motion  He exhibits tenderness (L wrist )  He exhibits no edema or deformity  Neurological: He is alert and oriented to person, place, and time  Skin: Skin is warm and dry  No rash noted  Vitals reviewed

## 2020-02-28 LAB
HIV 1+2 AB+HIV1 P24 AG SERPL QL IA: NORMAL
HSV1 IGG SER IA-ACNC: 1.08 INDEX (ref 0–0.9)
HSV2 IGG SER IA-ACNC: 8.2 INDEX (ref 0–0.9)

## 2020-09-01 ENCOUNTER — OFFICE VISIT (OUTPATIENT)
Dept: INTERNAL MEDICINE CLINIC | Facility: CLINIC | Age: 40
End: 2020-09-01
Payer: COMMERCIAL

## 2020-09-01 VITALS
BODY MASS INDEX: 32.42 KG/M2 | HEART RATE: 64 BPM | DIASTOLIC BLOOD PRESSURE: 84 MMHG | HEIGHT: 71 IN | TEMPERATURE: 97.7 F | SYSTOLIC BLOOD PRESSURE: 126 MMHG | WEIGHT: 231.6 LBS | OXYGEN SATURATION: 98 %

## 2020-09-01 DIAGNOSIS — R74.8 ELEVATED CK: ICD-10-CM

## 2020-09-01 DIAGNOSIS — R06.2 WHEEZING: ICD-10-CM

## 2020-09-01 DIAGNOSIS — J45.20 MILD INTERMITTENT ASTHMA, UNSPECIFIED WHETHER COMPLICATED: Primary | ICD-10-CM

## 2020-09-01 DIAGNOSIS — I10 ESSENTIAL HYPERTENSION: ICD-10-CM

## 2020-09-01 DIAGNOSIS — K21.9 GASTROESOPHAGEAL REFLUX DISEASE, ESOPHAGITIS PRESENCE NOT SPECIFIED: ICD-10-CM

## 2020-09-01 DIAGNOSIS — M25.532 CHRONIC WRIST PAIN, LEFT: ICD-10-CM

## 2020-09-01 DIAGNOSIS — G56.03 CARPAL TUNNEL SYNDROME, BILATERAL: ICD-10-CM

## 2020-09-01 DIAGNOSIS — G56.03 BILATERAL CARPAL TUNNEL SYNDROME: ICD-10-CM

## 2020-09-01 DIAGNOSIS — G89.29 CHRONIC WRIST PAIN, LEFT: ICD-10-CM

## 2020-09-01 DIAGNOSIS — E66.09 CLASS 1 OBESITY DUE TO EXCESS CALORIES WITH SERIOUS COMORBIDITY AND BODY MASS INDEX (BMI) OF 32.0 TO 32.9 IN ADULT: ICD-10-CM

## 2020-09-01 DIAGNOSIS — R73.9 HYPERGLYCEMIA: ICD-10-CM

## 2020-09-01 DIAGNOSIS — K76.9 LIVER LESION, RIGHT LOBE: ICD-10-CM

## 2020-09-01 PROBLEM — B35.3 TINEA PEDIS: Status: RESOLVED | Noted: 2019-07-03 | Resolved: 2020-09-01

## 2020-09-01 PROBLEM — Z20.2 POTENTIAL EXPOSURE TO STD: Status: RESOLVED | Noted: 2019-02-18 | Resolved: 2020-09-01

## 2020-09-01 PROBLEM — R20.2 NUMBNESS AND TINGLING IN BOTH HANDS: Status: RESOLVED | Noted: 2019-05-06 | Resolved: 2020-09-01

## 2020-09-01 PROBLEM — R20.0 NUMBNESS AND TINGLING IN BOTH HANDS: Status: RESOLVED | Noted: 2019-05-06 | Resolved: 2020-09-01

## 2020-09-01 PROCEDURE — 99214 OFFICE O/P EST MOD 30 MIN: CPT | Performed by: NURSE PRACTITIONER

## 2020-09-01 PROCEDURE — 1036F TOBACCO NON-USER: CPT | Performed by: NURSE PRACTITIONER

## 2020-09-01 PROCEDURE — 4010F ACE/ARB THERAPY RXD/TAKEN: CPT | Performed by: NURSE PRACTITIONER

## 2020-09-01 PROCEDURE — 3079F DIAST BP 80-89 MM HG: CPT | Performed by: NURSE PRACTITIONER

## 2020-09-01 RX ORDER — LOSARTAN POTASSIUM 25 MG/1
25 TABLET ORAL DAILY
Qty: 90 TABLET | Refills: 1 | Status: SHIPPED | OUTPATIENT
Start: 2020-09-01 | End: 2021-03-03 | Stop reason: SDUPTHER

## 2020-09-01 RX ORDER — ALBUTEROL SULFATE 90 UG/1
2 AEROSOL, METERED RESPIRATORY (INHALATION) EVERY 6 HOURS PRN
Qty: 1 INHALER | Refills: 5 | Status: SHIPPED | OUTPATIENT
Start: 2020-09-01 | End: 2021-09-14 | Stop reason: SDUPTHER

## 2020-09-01 RX ORDER — AMLODIPINE BESYLATE 10 MG/1
10 TABLET ORAL DAILY
Qty: 90 TABLET | Refills: 1 | Status: SHIPPED | OUTPATIENT
Start: 2020-09-01 | End: 2021-03-03 | Stop reason: SDUPTHER

## 2020-09-01 RX ORDER — TRAMADOL HYDROCHLORIDE 50 MG/1
50 TABLET ORAL EVERY 6 HOURS PRN
Qty: 10 TABLET | Refills: 0 | Status: CANCELLED | OUTPATIENT
Start: 2020-09-01

## 2020-09-01 RX ORDER — FAMOTIDINE 40 MG/1
40 TABLET, FILM COATED ORAL DAILY
Qty: 30 TABLET | Refills: 5 | Status: CANCELLED | OUTPATIENT
Start: 2020-09-01

## 2020-09-01 RX ORDER — MELOXICAM 15 MG/1
15 TABLET ORAL DAILY
Qty: 30 TABLET | Refills: 0 | Status: SHIPPED | OUTPATIENT
Start: 2020-09-01 | End: 2021-03-03 | Stop reason: SDUPTHER

## 2020-09-01 NOTE — PROGRESS NOTES
Assessment/Plan:    Problem List Items Addressed This Visit        Digestive    GERD (gastroesophageal reflux disease)     Asymptomatic on Nexium            Respiratory    Asthma - Primary     Albuterol prn          Relevant Medications    albuterol (PROVENTIL HFA,VENTOLIN HFA) 90 mcg/act inhaler       Cardiovascular and Mediastinum    Essential hypertension     Continue losartan and amlodipine         Relevant Medications    amLODIPine (NORVASC) 10 mg tablet    losartan (COZAAR) 25 mg tablet       Nervous and Auditory    Bilateral carpal tunnel syndrome     meloxicam prn for now  Encouraged pt to see ortho for surgery but he refuses until COVID pandemic is over          Carpal tunnel syndrome, bilateral       Other    Elevated CK    Relevant Orders    CK (with reflex to MB)    Class 1 obesity due to excess calories with serious comorbidity and body mass index (BMI) of 32 0 to 32 9 in adult    Relevant Orders    Lipid Panel with Direct LDL reflex    Comprehensive metabolic panel    Wheezing     Continue albuterol  Check CXR         Relevant Medications    albuterol (PROVENTIL HFA,VENTOLIN HFA) 90 mcg/act inhaler    Other Relevant Orders    XR chest pa & lateral    Liver lesion, right lobe     F/u MRI reordered         Relevant Orders    MRI abdomen w wo contrast      Other Visit Diagnoses     Chronic wrist pain, left        pt is s/p carpal tunnel surgery in this hand  short course nsaid with food   f/u with ortho if needed     Relevant Medications    meloxicam (MOBIC) 15 mg tablet    Hyperglycemia        Relevant Orders    Hemoglobin A1C          BMI Counseling: Body mass index is 32 3 kg/m²  Discussed the patient's BMI with him  The BMI is above normal  Nutrition recommendations include reducing portion sizes, decreasing overall calorie intake and 3-5 servings of fruits/vegetables daily  Exercise recommendations include moderate aerobic physical activity for 150 minutes/week         M*Taste Filter software was used to dictate this note  It may contain errors with dictating incorrect words or incorrect spelling  Please contact the provider directly with any questions  Subjective:      Patient ID: Leda May is a 36 y o  male  HPI     Patient presents for 6 month follow up  He has no new labs to review today  Overall he is doing well  He is working with the community for [employer]  He does report running out of his medications a few weeks ago  Hypertension  He is not currently taking his amlodipine or his losartan  He checks his BP at the grocery store  His BP has been running 140s/ upper 90s  Obesity   He has significantly improved his diet  "I don't eat the stuff I'm not supposed to"  He is working out by doing body weight exercises at home  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  He has lost about 30 lbs in the last year  He has lost about 60 lbs in the last 2 years  GERD  He is currently using Nexium OTC  He was not having relief with the pepcid  Symptoms are made worse by spicy foods  Bilateral carpal tunnel  Patient had surgery of the right wrist in June 2019 and tells me all of his symptoms are resolved in the right wrist and hand  He has good function and use of the hand without issues  He is now complaining of ongoing pain, numbness and tingling in the left hand which we know also has severe carpal tunnel but he refuses to see ortho or consider surgery during the covid pandemic  He also fears not being able to work because he is a single father  PTSD  He follows with [facility]  He is currently on medical marijuana  The following portions of the patient's history were reviewed and updated as appropriate: allergies, current medications, past family history, past medical history, past social history, past surgical history and problem list     Review of Systems   Constitutional: Negative for chills and fever  Respiratory: Negative for cough, chest tightness, shortness of breath and wheezing  Cardiovascular: Negative for chest pain  Gastrointestinal: Negative for abdominal pain, constipation, diarrhea, nausea and vomiting  Musculoskeletal: Positive for arthralgias (left wrist/hand)  Neurological: Positive for numbness (left hand)  Negative for light-headedness  Psychiatric/Behavioral: Negative for self-injury and suicidal ideas  The patient is nervous/anxious            Past Medical History:   Diagnosis Date    Adult situational stress disorder     Asthma     Illness induced    Diabetes mellitus ([address] )     Diet Controlled    GERD (gastroesophageal reflux disease)     Hypertension     Type 2 diabetes mellitus without complication, with long-term current use of insulin ([facility]) 3/2/2018         Current Outpatient Medications:     albuterol (PROVENTIL HFA,VENTOLIN HFA) 90 mcg/act inhaler, Inhale 2 puffs every 6 (six) hours as needed for wheezing or shortness of breath, Disp: 1 Inhaler, Rfl: 5    amLODIPine (NORVASC) 10 mg tablet, Take 1 tablet (10 mg total) by mouth daily, Disp: 90 tablet, Rfl: 1    bismuth subsalicylate (PEPTO BISMOL) 524 mg/30 mL oral suspension, Take 15 mL by mouth every 6 (six) hours as needed for indigestion, Disp: , Rfl:     losartan (COZAAR) 25 mg tablet, Take 1 tablet (25 mg total) by mouth daily, Disp: 90 tablet, Rfl: 1    meloxicam (MOBIC) 15 mg tablet, Take 1 tablet (15 mg total) by mouth daily, Disp: 30 tablet, Rfl: 0    Allergies   Allergen Reactions    Dust Mite Extract Other (See Comments) and Nasal Congestion     Itchy eyes    Pollen Extract Other (See Comments) and Nasal Congestion     Itchy eyes       Social History   Past Surgical History:   Procedure Laterality Date    NO PAST SURGERIES      [place] INCISE FINGER TENDON SHEATH Right 6/11/2019    Procedure: RING TRIGGER FINGER RELEASE;  Surgeon: Shara Krishnamurthy MD;  [facility];  Service: Orthopedics    [place] REVISE MEDIAN N/CARPAL TUNNEL SURG Right 6/11/2019    Procedure: CARPAL TUNNEL RELEASE;  Surgeon: Miguel Turcios MD;  [facility];  Service: Orthopedics     Family History   Problem Relation Age of Onset    Asthma Mother     Diabetes Mother     Hypertension Mother     Diabetes Father     Hypertension Father        Objective:  /84 (BP Location: Left arm, Patient Position: Sitting, Cuff Size: Adult)   Pulse 64   Temp 97 7 °F (36 5 °C) (Temporal)   Ht 5' 11" (1 803 m)   Wt 105 kg (231 lb 9 6 oz)   SpO2 98% Comment: ra  BMI 32 30 kg/m²      Physical Exam  Vitals signs reviewed  Constitutional:       General: He is not in acute distress  Appearance: He is well-developed  He is not diaphoretic  HENT:      Head: Normocephalic and atraumatic  Eyes:      Extraocular Movements: Extraocular movements intact  Conjunctiva/sclera: Conjunctivae normal       Pupils: Pupils are equal, round, and reactive to light  Neck:      Musculoskeletal: Normal range of motion and neck supple  Cardiovascular:      Rate and Rhythm: Normal rate and regular rhythm  Heart sounds: Normal heart sounds  No murmur  Pulmonary:      Effort: Pulmonary effort is normal  No respiratory distress  Breath sounds: Wheezing (throughout) present  No decreased breath sounds, rhonchi or rales  Musculoskeletal:      Right lower leg: No edema  Left lower leg: No edema  Lymphadenopathy:      Cervical: No cervical adenopathy  Skin:     General: Skin is warm and dry  Neurological:      Mental Status: He is alert and oriented to person, place, and time  Psychiatric:         Mood and Affect: Mood normal          Behavior: Behavior normal          Thought Content:  Thought content normal          Judgment: Judgment normal

## 2020-09-01 NOTE — PATIENT INSTRUCTIONS
Continue same medications   Check labs in 6 months prior to visit   Go for chest xray     Schedule MRI     I will call with results

## 2020-09-01 NOTE — ASSESSMENT & PLAN NOTE
meloxicam prn for now  Encouraged pt to see ortho for surgery but he refuses until COVID pandemic is over

## 2020-11-03 ENCOUNTER — NURSE TRIAGE (OUTPATIENT)
Dept: OTHER | Facility: OTHER | Age: 40
End: 2020-11-03

## 2020-11-12 ENCOUNTER — TELEMEDICINE (OUTPATIENT)
Dept: INTERNAL MEDICINE CLINIC | Facility: CLINIC | Age: 40
End: 2020-11-12
Payer: COMMERCIAL

## 2020-11-12 DIAGNOSIS — B34.9 VIRAL INFECTION, UNSPECIFIED: ICD-10-CM

## 2020-11-12 DIAGNOSIS — Z20.822 EXPOSURE TO COVID-19 VIRUS: ICD-10-CM

## 2020-11-12 PROCEDURE — 99213 OFFICE O/P EST LOW 20 MIN: CPT | Performed by: INTERNAL MEDICINE

## 2020-11-12 PROCEDURE — 1036F TOBACCO NON-USER: CPT | Performed by: INTERNAL MEDICINE

## 2021-03-03 ENCOUNTER — OFFICE VISIT (OUTPATIENT)
Dept: INTERNAL MEDICINE CLINIC | Facility: CLINIC | Age: 41
End: 2021-03-03
Payer: COMMERCIAL

## 2021-03-03 VITALS
WEIGHT: 236 LBS | BODY MASS INDEX: 33.04 KG/M2 | TEMPERATURE: 97.6 F | SYSTOLIC BLOOD PRESSURE: 140 MMHG | HEIGHT: 71 IN | HEART RATE: 68 BPM | OXYGEN SATURATION: 98 % | DIASTOLIC BLOOD PRESSURE: 82 MMHG

## 2021-03-03 DIAGNOSIS — J45.20 MILD INTERMITTENT ASTHMA, UNSPECIFIED WHETHER COMPLICATED: ICD-10-CM

## 2021-03-03 DIAGNOSIS — Z11.3 SCREENING EXAMINATION FOR STD (SEXUALLY TRANSMITTED DISEASE): ICD-10-CM

## 2021-03-03 DIAGNOSIS — R74.8 ELEVATED CK: ICD-10-CM

## 2021-03-03 DIAGNOSIS — K76.9 LIVER LESION, RIGHT LOBE: ICD-10-CM

## 2021-03-03 DIAGNOSIS — I10 ESSENTIAL HYPERTENSION: Primary | ICD-10-CM

## 2021-03-03 DIAGNOSIS — G56.03 BILATERAL CARPAL TUNNEL SYNDROME: ICD-10-CM

## 2021-03-03 DIAGNOSIS — I44.0 1ST DEGREE AV BLOCK: ICD-10-CM

## 2021-03-03 DIAGNOSIS — Z82.41 FAMILY HISTORY OF SUDDEN CARDIAC DEATH: ICD-10-CM

## 2021-03-03 DIAGNOSIS — M25.532 CHRONIC WRIST PAIN, LEFT: ICD-10-CM

## 2021-03-03 DIAGNOSIS — G89.29 CHRONIC WRIST PAIN, LEFT: ICD-10-CM

## 2021-03-03 PROCEDURE — 3008F BODY MASS INDEX DOCD: CPT

## 2021-03-03 PROCEDURE — 3725F SCREEN DEPRESSION PERFORMED: CPT | Performed by: NURSE PRACTITIONER

## 2021-03-03 PROCEDURE — 93000 ELECTROCARDIOGRAM COMPLETE: CPT | Performed by: NURSE PRACTITIONER

## 2021-03-03 PROCEDURE — 4010F ACE/ARB THERAPY RXD/TAKEN: CPT

## 2021-03-03 PROCEDURE — 99214 OFFICE O/P EST MOD 30 MIN: CPT | Performed by: NURSE PRACTITIONER

## 2021-03-03 RX ORDER — AMLODIPINE BESYLATE 10 MG/1
10 TABLET ORAL DAILY
Qty: 90 TABLET | Refills: 1 | Status: SHIPPED | OUTPATIENT
Start: 2021-03-03 | End: 2021-09-14 | Stop reason: SDUPTHER

## 2021-03-03 RX ORDER — MELOXICAM 15 MG/1
15 TABLET ORAL DAILY
Qty: 30 TABLET | Refills: 0 | Status: SHIPPED | OUTPATIENT
Start: 2021-03-03 | End: 2021-09-14 | Stop reason: SDUPTHER

## 2021-03-03 RX ORDER — LOSARTAN POTASSIUM 25 MG/1
25 TABLET ORAL DAILY
Qty: 90 TABLET | Refills: 1 | Status: SHIPPED | OUTPATIENT
Start: 2021-03-03 | End: 2021-09-14 | Stop reason: SDUPTHER

## 2021-03-03 NOTE — PROGRESS NOTES
Assessment/Plan:    Problem List Items Addressed This Visit        Respiratory    Asthma     Recommended using albuterol 15 minutes before exercise  Otherwise well controlled            Cardiovascular and Mediastinum    Essential hypertension - Primary     Continue amlodipine 10mg and losartan 25mg   Monitor blood pressure at home  Discussed importance of medication compliance         Relevant Medications    amLODIPine (NORVASC) 10 mg tablet    losartan (COZAAR) 25 mg tablet    Other Relevant Orders    POCT ECG (Completed)    Ambulatory referral to Cardiology       Nervous and Auditory    Bilateral carpal tunnel syndrome     S/p release of right wrist June 2019  meloxicam prn for now  Encouraged pt to see ortho for surgery for left wrist but he refuses until COVID pandemic is over             Other    Elevated CK     asymptomatic  Continue to monitor         Liver lesion, right lobe     Due for MRI follow up            Other Visit Diagnoses     Chronic wrist pain, left        pt is s/p carpal tunnel surgery in this hand  short course nsaid with food   f/u with ortho if needed     Relevant Medications    meloxicam (MOBIC) 15 mg tablet    Family history of sudden cardiac death        brother age 40 collapsed suddenly  reports he was told his heart stopped  denies medical condions other than asthma  autopsy pending     Relevant Medications    amLODIPine (NORVASC) 10 mg tablet    Other Relevant Orders    POCT ECG (Completed)    Ambulatory referral to Cardiology    1st degree AV block        Relevant Orders    Ambulatory referral to Cardiology    Screening examination for STD (sexually transmitted disease)        Relevant Orders    HIV 1/2 Antigen/Antibody (4th Generation) w Reflex [facility]    RPR    Hepatitis panel, acute    Chlamydia/GC amplified DNA by PCR          BMI Counseling: Body mass index is 32 92 kg/m²  Discussed the patient's BMI with him   The BMI is above normal  Nutrition recommendations include reducing portion sizes, decreasing overall calorie intake, 3-5 servings of fruits/vegetables daily, moderation in carbohydrate intake and increasing intake of lean protein  Exercise recommendations include moderate aerobic physical activity for 150 minutes/week  M*Modal software was used to dictate this note  It may contain errors with dictating incorrect words or incorrect spelling  Please contact the provider directly with any questions  Subjective:      Patient ID: Cristin Martins is a 36 y o  male  HPI    Patient presents today for 6 month follow up  He did not have his labs done before today's visit  He states he has not been taking his medication for the last 2 months because his refills were at the pharmacy but he knew someone who had covid and went into the pharmacy so he avoided it  Unfortunately his brother passed away on Jan 30th  He believes it was sudden cardiac arrest but states that the autopsy is still pending  He tells me he went to the hospital and couldn't breath, collapsed and he was told his heart just stopped    HTN - He is currently on amlodipine 10mg daily and losartan 25mg daily  He is checking his BP at work and has been getting about 130/80s  He denies any chest pain, vision changes or headaches  He reports 1 episode of right sided chest pain while shoveling snow but thinks it was muscular     Asthma - uses albuterol with exercise, otherwise does not need it use it   No chest tightness, cough or SOB         The following portions of the patient's history were reviewed and updated as appropriate: allergies, current medications, past family history, past medical history, past social history, past surgical history and problem list     Review of Systems   Constitutional: Negative for activity change, appetite change, chills and fever  Eyes: Negative for visual disturbance  Respiratory: Negative for cough, chest tightness, shortness of breath and wheezing      Cardiovascular: Negative for chest pain and leg swelling  Neurological: Negative for dizziness, light-headedness and headaches  Psychiatric/Behavioral: The patient is nervous/anxious (uses medical marijuana )            Past Medical History:   Diagnosis Date    Adult situational stress disorder     Asthma     Illness induced    Diabetes mellitus ([address] )     Diet Controlled    GERD (gastroesophageal reflux disease)     Hypertension     Type 2 diabetes mellitus without complication, with long-term current use of insulin ([facility] 3/2/2018         Current Outpatient Medications:     albuterol (PROVENTIL HFA,VENTOLIN HFA) 90 mcg/act inhaler, Inhale 2 puffs every 6 (six) hours as needed for wheezing or shortness of breath, Disp: 1 Inhaler, Rfl: 5    amLODIPine (NORVASC) 10 mg tablet, Take 1 tablet (10 mg total) by mouth daily, Disp: 90 tablet, Rfl: 1    losartan (COZAAR) 25 mg tablet, Take 1 tablet (25 mg total) by mouth daily, Disp: 90 tablet, Rfl: 1    meloxicam (MOBIC) 15 mg tablet, Take 1 tablet (15 mg total) by mouth daily, Disp: 30 tablet, Rfl: 0    bismuth subsalicylate (PEPTO BISMOL) 524 mg/30 mL oral suspension, Take 15 mL by mouth every 6 (six) hours as needed for indigestion, Disp: , Rfl:     Allergies   Allergen Reactions    Dust Mite Extract Other (See Comments) and Nasal Congestion     Itchy eyes    Pollen Extract Other (See Comments) and Nasal Congestion     Itchy eyes       Social History   Past Surgical History:   Procedure Laterality Date    NO PAST SURGERIES      [place] INCISE FINGER TENDON SHEATH Right 6/11/2019    Procedure: RING TRIGGER FINGER RELEASE;  Surgeon: David Sunshine MD;  [facility];  Service: Orthopedics    ND REVISE MEDIAN N/CARPAL TUNNEL SURG Right 6/11/2019    Procedure: CARPAL TUNNEL RELEASE;  Surgeon: David Sunshine MD;  [facility];  Service: Orthopedics     Family History   Problem Relation Age of Onset    Asthma Mother     Diabetes Mother    [place] Hypertension Mother     Diabetes Father     Hypertension Father        Objective:  /82 (BP Location: Left arm, Patient Position: Sitting, Cuff Size: Large)   Pulse 68   Temp 97 6 °F (36 4 °C)   Ht 5' 11" (1 803 m)   Wt 107 kg (236 lb)   SpO2 98%   BMI 32 92 kg/m²      Physical Exam  Vitals signs reviewed  Constitutional:       General: He is not in acute distress  Appearance: Normal appearance  He is obese  He is not diaphoretic  Comments: masked   HENT:      Head: Normocephalic and atraumatic  Eyes:      Extraocular Movements: Extraocular movements intact  Conjunctiva/sclera: Conjunctivae normal       Pupils: Pupils are equal, round, and reactive to light  Cardiovascular:      Rate and Rhythm: Normal rate and regular rhythm  Heart sounds: Normal heart sounds  No murmur  Pulmonary:      Effort: Pulmonary effort is normal  No respiratory distress  Breath sounds: Normal breath sounds  No wheezing, rhonchi or rales  Musculoskeletal:      Right lower leg: No edema  Left lower leg: No edema  Neurological:      Mental Status: He is alert and oriented to person, place, and time  Mental status is at baseline     Psychiatric:         Mood and Affect: Mood normal          Behavior: Behavior normal

## 2021-03-04 PROBLEM — Z20.822 EXPOSURE TO COVID-19 VIRUS: Status: RESOLVED | Noted: 2020-11-12 | Resolved: 2021-03-04

## 2021-03-04 PROBLEM — B34.9 VIRAL INFECTION, UNSPECIFIED: Status: RESOLVED | Noted: 2020-11-12 | Resolved: 2021-03-04

## 2021-03-04 PROBLEM — K52.9 GASTROENTERITIS: Status: RESOLVED | Noted: 2020-01-24 | Resolved: 2021-03-04

## 2021-03-04 NOTE — ASSESSMENT & PLAN NOTE
Continue amlodipine 10mg and losartan 25mg   Monitor blood pressure at home  Discussed importance of medication compliance

## 2021-03-04 NOTE — ASSESSMENT & PLAN NOTE
S/p release of right wrist June 2019  meloxicam prn for now  Encouraged pt to see ortho for surgery for left wrist but he refuses until COVID pandemic is over

## 2021-03-09 ENCOUNTER — APPOINTMENT (OUTPATIENT)
Dept: LAB | Facility: HOSPITAL | Age: 41
End: 2021-03-09
Payer: COMMERCIAL

## 2021-03-09 DIAGNOSIS — Z11.3 SCREENING EXAMINATION FOR STD (SEXUALLY TRANSMITTED DISEASE): ICD-10-CM

## 2021-03-09 PROCEDURE — 86592 SYPHILIS TEST NON-TREP QUAL: CPT

## 2021-03-09 PROCEDURE — 80074 ACUTE HEPATITIS PANEL: CPT

## 2021-03-09 PROCEDURE — 87491 CHLMYD TRACH DNA AMP PROBE: CPT

## 2021-03-09 PROCEDURE — 87591 N.GONORRHOEAE DNA AMP PROB: CPT

## 2021-03-09 PROCEDURE — 36415 COLL VENOUS BLD VENIPUNCTURE: CPT

## 2021-03-09 PROCEDURE — 87389 HIV-1 AG W/HIV-1&-2 AB AG IA: CPT

## 2021-03-10 LAB
C TRACH DNA SPEC QL NAA+PROBE: NEGATIVE
HAV IGM SER QL: NORMAL
HBV CORE IGM SER QL: NORMAL
HBV SURFACE AG SER QL: NORMAL
HCV AB SER QL: NORMAL
HIV 1+2 AB+HIV1 P24 AG SERPL QL IA: NORMAL
N GONORRHOEA DNA SPEC QL NAA+PROBE: NEGATIVE
RPR SER QL: NORMAL

## 2021-03-30 ENCOUNTER — CONSULT (OUTPATIENT)
Dept: CARDIOLOGY CLINIC | Facility: CLINIC | Age: 41
End: 2021-03-30
Payer: COMMERCIAL

## 2021-03-30 VITALS
WEIGHT: 230.6 LBS | HEART RATE: 67 BPM | BODY MASS INDEX: 32.16 KG/M2 | SYSTOLIC BLOOD PRESSURE: 156 MMHG | DIASTOLIC BLOOD PRESSURE: 100 MMHG

## 2021-03-30 DIAGNOSIS — R94.31 ABNORMAL EKG: Primary | ICD-10-CM

## 2021-03-30 DIAGNOSIS — I44.0 1ST DEGREE AV BLOCK: ICD-10-CM

## 2021-03-30 DIAGNOSIS — Z82.41 FAMILY HISTORY OF SUDDEN CARDIAC DEATH: ICD-10-CM

## 2021-03-30 DIAGNOSIS — I10 ESSENTIAL HYPERTENSION: ICD-10-CM

## 2021-03-30 PROCEDURE — 3077F SYST BP >= 140 MM HG: CPT

## 2021-03-30 PROCEDURE — 93000 ELECTROCARDIOGRAM COMPLETE: CPT

## 2021-03-30 PROCEDURE — 3080F DIAST BP >= 90 MM HG: CPT

## 2021-03-30 PROCEDURE — 99244 OFF/OP CNSLTJ NEW/EST MOD 40: CPT

## 2021-03-30 PROCEDURE — 1036F TOBACCO NON-USER: CPT

## 2021-03-30 NOTE — PROGRESS NOTES
Cardiology Consultation   MD Toño Lester MD Brunetta Deal, DO, MD Sandy Keenan DO, Marilin Martinez DO, [facility] [place]  -------------------------------------------------------------------  [facility]  [address] [place] [id number]  [phone]  [phone]  [id number]  Johnson Grajeda  YOB: 1980   MRN: [id number]      Referring Physician: ETELVINA Lopes  [address] [place] [address]  [address]  Falguni Browning     HPI:  I am seeing this patient in cardiology consultation for:   Abnormal ECG, family history of sudden cardiac death    Johnson Grajeda is a 36 y o  male with  Hypertension, diabetes which has resolved after he lost 100 lb, family history of sudden cardiac death in his brother at the age of 40 who presents today for initial cardiac evaluation  His ECG was noted to be abnormal with a first-degree AV block  He does have significant hypertension for which she is being treated by his primary care provider  It is he also has history of diabetes which has since resolved after he lost about 100 lb recently  His hemoglobin A1c is now 5 3  He does note some symptoms of atypical chest pain, sometimes while playing basketball, but he states it is more positional in nature  His brother unfortunately  from what appears to be a sudden cardiac arrest in late 2021  He was having chest pain presented to the hospital and lost consciousness while in the lobby of the emergency department and was never able to be revived  There is no other significant history in his family of sudden cardiac death    Review of Systems   Constitutional: Negative for chills and fever  HENT: Negative for facial swelling and sore throat  Eyes: Negative for visual disturbance  Respiratory: Negative for cough, chest tightness, shortness of breath and wheezing  Cardiovascular: Positive for chest pain  Negative for palpitations and leg swelling  Gastrointestinal: Negative for abdominal pain, blood in stool, constipation, diarrhea, nausea and vomiting  Endocrine: Negative for cold intolerance and heat intolerance  Genitourinary: Negative for decreased urine volume, difficulty urinating, dysuria and hematuria  Musculoskeletal: Negative for arthralgias, back pain and myalgias  Skin: Negative for rash  Neurological: Negative for dizziness, syncope, weakness and numbness  Psychiatric/Behavioral: Negative for agitation, behavioral problems and confusion  The patient is not nervous/anxious  OBJECTIVE  Vitals:    03/30/21 1503   BP: 156/100   Pulse: 67       Physical Exam    General appearance: alert and oriented, in no acute distress  Head: Normocephalic, without obvious abnormality, atraumatic  Eyes: conjunctivae/corneas clear  Anicteric  Neck: no adenopathy, no carotid bruit, no JVD  Lungs: clear to auscultation bilaterally  Heart: regular rate and rhythm, S1, S2 normal, no murmur, no click, rub or gallop  Abdomen:  soft, non-tender; bowel sounds normal; no masses,  no organomegaly  Extremities: extremities normal, warm and well-perfused; no cyanosis, clubbing, or edema  Skin: Skin color, texture, turgor normal  No rashes or lesions     EKG:  No results found for this visit on 03/30/21  IMPRESSION:  1  Abnormal EKG with first-degree AV block   2  Hypertension  3  Diabetes which has resolved   4  Family history of sudden cardiac death in his brother at the age of 40    DISCUSSION/RECOMMENDATIONS:    at this time given his abnormal electrocardiogram, hypertension, prior diabetes which is resolved with severe weight loss, and family history of sudden cardiac death in his brother, would definitely investigate further  He has atypical chest pain as well       Would check echocardiogram to rule out underlying structural heart disease, rule out familial cardiomyopathy   ECG does show first-degree AV block  With chest pain and his brother's history, would evaluate further with a regular treadmill stress test   Continue current blood pressure medications, this is being managed by his primary care doctor, he may need to increase the doses on some of these as he still appears to be somewhat uncontrolled   Plan for follow-up in 4 6 weeks to review results of echo and stress and for further recommendations    Marcio Mckinney DO, [facility] [place]  --------------------------------------------------------------------------------  TREADMILL STRESS  No results found for this or any previous visit    ----------------------------------------------------------------------------------------------  NUCLEAR STRESS TEST: No results found for this or any previous visit  No results found for this or any previous visit     --------------------------------------------------------------------------------  CATH:  No results found for this or any previous visit   --------------------------------------------------------------------------------  ECHO:   No results found for this or any previous visit  No results found for this or any previous visit   --------------------------------------------------------------------------------  HOLTER  No results found for this or any previous visit   --------------------------------------------------------------------------------  CAROTIDS  No results found for this or any previous visit  Diagnoses and all orders for this visit:    Abnormal EKG  -     POCT ECG  -     Echo complete with contrast if indicated; Future  -     Stress test only, exercise; Future    Essential hypertension  Comments:  improved, continue meds as outlined  Orders:  -     Ambulatory referral to Cardiology  -     Echo complete with contrast if indicated; Future  -     Stress test only, exercise;  Future    Family history of sudden cardiac death  Comments:  brother age 40 collapsed suddenly  reports he was told his heart stopped  denies medical condions other than asthma  autopsy pending   Orders:  -     Ambulatory referral to Cardiology  -     Echo complete with contrast if indicated; Future  -     Stress test only, exercise; Future    1st degree AV block  -     Ambulatory referral to Cardiology  -     POCT ECG  -     Echo complete with contrast if indicated; Future  -     Stress test only, exercise; Future       ======================================================    Past Medical History:   Diagnosis Date    Adult situational stress disorder     Asthma     Illness induced    Diabetes mellitus ([facility] [address] )     Diet Controlled    GERD (gastroesophageal reflux disease)     Hypertension     Type 2 diabetes mellitus without complication, with long-term current use of insulin ([facility] ) 3/2/2018     Past Surgical History:   Procedure Laterality Date    NO PAST SURGERIES      GA INCISE FINGER TENDON SHEATH Right 6/11/2019    Procedure: RING TRIGGER FINGER RELEASE;  Surgeon: Claribel Wilson MD;  [facility];  Service: Orthopedics    GA REVISE MEDIAN N/CARPAL TUNNEL SURG Right 6/11/2019    Procedure: CARPAL TUNNEL RELEASE;  Surgeon: Claribel Wilson MD;  [facility];  Service: Orthopedics         Medications  Current Outpatient Medications   Medication Sig Dispense Refill    albuterol (PROVENTIL HFA,VENTOLIN HFA) 90 mcg/act inhaler Inhale 2 puffs every 6 (six) hours as needed for wheezing or shortness of breath 1 Inhaler 5    amLODIPine (NORVASC) 10 mg tablet Take 1 tablet (10 mg total) by mouth daily 90 tablet 1    bismuth subsalicylate (PEPTO BISMOL) 524 mg/30 mL oral suspension Take 15 mL by mouth every 6 (six) hours as needed for indigestion      losartan (COZAAR) 25 mg tablet Take 1 tablet (25 mg total) by mouth daily 90 tablet 1    meloxicam (MOBIC) 15 mg tablet Take 1 tablet (15 mg total) by mouth daily 30 tablet 0     No current facility-administered medications for this visit           Allergies Allergen Reactions    Dust Mite Extract Other (See Comments) and Nasal Congestion     Itchy eyes    Pollen Extract Other (See Comments) and Nasal Congestion     Itchy eyes       Social History     Socioeconomic History    Marital status: Single     Spouse name: Not on file    Number of children: Not on file    Years of education: Not on file    Highest education level: Not on file   Occupational History    Not on file   Social Needs    Financial resource strain: Not on file    Food insecurity     Worry: Not on file     Inability: Not on file    Transportation needs     Medical: Not on file     Non-medical: Not on file   Tobacco Use    Smoking status: Former Smoker     Types: Cigarettes    Smokeless tobacco: Never Used    Tobacco comment: smoked a couple years   Substance and Sexual Activity    Alcohol use: Yes     Frequency: 2-4 times a month     Drinks per session: 1 or 2     Binge frequency: Never     Comment: occasionally    Drug use: Yes     Types: Marijuana     Comment: Medical - couple times per week    Sexual activity: Yes     Partners: Female     Birth control/protection: None   Lifestyle    Physical activity     Days per week: Not on file     Minutes per session: Not on file    Stress: Not on file   Relationships    Social connections     Talks on phone: Not on file     Gets together: Not on file     Attends Restorationism service: Not on file     Active member of club or organization: Not on file     Attends meetings of clubs or organizations: Not on file     Relationship status: Not on file    Intimate partner violence     Fear of current or ex partner: Not on file     Emotionally abused: Not on file     Physically abused: Not on file     Forced sexual activity: Not on file   Other Topics Concern    Not on file   Social History Narrative    Not on file        Family History   Problem Relation Age of Onset    Asthma Mother     Diabetes Mother     Hypertension Mother     Diabetes Father     Hypertension Father        Lab Results   Component Value Date    WBC 4 71 07/09/2019    HGB 13 5 07/09/2019    HCT 39 9 07/09/2019    MCV 82 07/09/2019     07/09/2019      Lab Results   Component Value Date    SODIUM 142 02/27/2020    K 3 8 02/27/2020     (H) 02/27/2020    CO2 28 02/27/2020    BUN 14 02/27/2020    CREATININE 1 18 02/27/2020    GLUC 98 07/07/2019    CALCIUM 8 9 02/27/2020      Lab Results   Component Value Date    HGBA1C 5 3 02/27/2020      No results found for: CHOL  Lab Results   Component Value Date    HDL 31 (L) 07/09/2019    HDL 29 (L) 10/11/2018    HDL 28 (L) 04/04/2018     Lab Results   Component Value Date    LDLCALC 111 (H) 07/09/2019    [address] 10/11/2018    LDLCALC 99 04/04/2018     Lab Results   Component Value Date    TRIG 106 07/09/2019    TRIG 146 10/11/2018    TRIG 149 04/04/2018     No results found for: CHOLHDL   No results found for: INR, PROTIME       Patient Active Problem List    Diagnosis Date Noted    Liver lesion, right lobe 10/09/2019     Priority: Low    Wheezing 09/18/2019     Priority: Low    Right hand pain 09/18/2019     Priority: Low    Tinea unguium 06/17/2019     Priority: Low    Class 1 obesity due to excess calories with serious comorbidity and body mass index (BMI) of 32 0 to 32 9 in adult 06/14/2019     Priority: Low    Elevated CK 05/21/2019     Priority: Low    Carpal tunnel syndrome, bilateral 05/16/2019     Priority: Low    Bilateral carpal tunnel syndrome      Priority: Low    Trigger ring finger of right hand 05/06/2019     Priority: Low    Joint pain 02/18/2019     Priority: Low    Myalgia 02/18/2019     Priority: Low    Cigarette nicotine dependence without complication 55/93/2461     Priority: Low    Essential hypertension 09/12/2018     Priority: Low    Adult situational stress disorder 06/25/2018     Priority: Low    Allergic rhinitis 05/15/2018     Priority: Low    GERD (gastroesophageal reflux disease) 03/02/2018     Priority: Low    Asthma 03/02/2018     Priority: Low       Portions of the record may have been created with voice recognition software  Occasional wrong word or "sound a like" substitutions may have occurred due to the inherent limitations of voice recognition software  Read the chart carefully and recognize, using context, where substitutions have occurred          Tadeo Key DO, [facility] [place]  3/30/2021 3:56 PM

## 2021-09-14 ENCOUNTER — OFFICE VISIT (OUTPATIENT)
Dept: INTERNAL MEDICINE CLINIC | Facility: CLINIC | Age: 41
End: 2021-09-14
Payer: COMMERCIAL

## 2021-09-14 VITALS
WEIGHT: 242 LBS | HEIGHT: 71 IN | SYSTOLIC BLOOD PRESSURE: 144 MMHG | BODY MASS INDEX: 33.88 KG/M2 | OXYGEN SATURATION: 99 % | TEMPERATURE: 97.9 F | HEART RATE: 80 BPM | DIASTOLIC BLOOD PRESSURE: 80 MMHG

## 2021-09-14 DIAGNOSIS — Z86.39 HISTORY OF DIABETES MELLITUS, TYPE II: ICD-10-CM

## 2021-09-14 DIAGNOSIS — M25.532 CHRONIC WRIST PAIN, LEFT: ICD-10-CM

## 2021-09-14 DIAGNOSIS — Z13.0 SCREENING FOR DEFICIENCY ANEMIA: ICD-10-CM

## 2021-09-14 DIAGNOSIS — G56.03 BILATERAL CARPAL TUNNEL SYNDROME: ICD-10-CM

## 2021-09-14 DIAGNOSIS — G89.29 CHRONIC WRIST PAIN, LEFT: ICD-10-CM

## 2021-09-14 DIAGNOSIS — Z13.220 SCREENING FOR LIPID DISORDERS: ICD-10-CM

## 2021-09-14 DIAGNOSIS — J45.20 MILD INTERMITTENT ASTHMA, UNSPECIFIED WHETHER COMPLICATED: ICD-10-CM

## 2021-09-14 DIAGNOSIS — K76.9 LIVER LESION, RIGHT LOBE: ICD-10-CM

## 2021-09-14 DIAGNOSIS — R06.2 WHEEZING: ICD-10-CM

## 2021-09-14 DIAGNOSIS — I10 ESSENTIAL HYPERTENSION: Primary | ICD-10-CM

## 2021-09-14 PROBLEM — I44.0 AV BLOCK, 1ST DEGREE: Status: ACTIVE | Noted: 2021-09-14

## 2021-09-14 PROBLEM — M65.341 TRIGGER RING FINGER OF RIGHT HAND: Status: RESOLVED | Noted: 2019-05-06 | Resolved: 2021-09-14

## 2021-09-14 PROCEDURE — 3725F SCREEN DEPRESSION PERFORMED: CPT | Performed by: NURSE PRACTITIONER

## 2021-09-14 PROCEDURE — 99214 OFFICE O/P EST MOD 30 MIN: CPT | Performed by: NURSE PRACTITIONER

## 2021-09-14 PROCEDURE — 3008F BODY MASS INDEX DOCD: CPT | Performed by: NURSE PRACTITIONER

## 2021-09-14 PROCEDURE — 3077F SYST BP >= 140 MM HG: CPT | Performed by: NURSE PRACTITIONER

## 2021-09-14 PROCEDURE — 3079F DIAST BP 80-89 MM HG: CPT | Performed by: NURSE PRACTITIONER

## 2021-09-14 PROCEDURE — 4010F ACE/ARB THERAPY RXD/TAKEN: CPT | Performed by: NURSE PRACTITIONER

## 2021-09-14 RX ORDER — MELOXICAM 15 MG/1
15 TABLET ORAL DAILY
Qty: 30 TABLET | Refills: 0 | Status: SHIPPED | OUTPATIENT
Start: 2021-09-14

## 2021-09-14 RX ORDER — AMLODIPINE BESYLATE 10 MG/1
10 TABLET ORAL DAILY
Qty: 90 TABLET | Refills: 1 | Status: SHIPPED | OUTPATIENT
Start: 2021-09-14

## 2021-09-14 RX ORDER — LOSARTAN POTASSIUM 50 MG/1
50 TABLET ORAL DAILY
Qty: 90 TABLET | Refills: 1 | Status: SHIPPED | OUTPATIENT
Start: 2021-09-14

## 2021-09-14 RX ORDER — ALBUTEROL SULFATE 90 UG/1
2 AEROSOL, METERED RESPIRATORY (INHALATION) EVERY 6 HOURS PRN
Qty: 18 G | Refills: 1 | Status: SHIPPED | OUTPATIENT
Start: 2021-09-14

## 2021-09-14 NOTE — PATIENT INSTRUCTIONS
Increase losartan to 50mg daily   Continue amlodipine 10mg daily     Follow up  In 6 months    Go for labs, fasting    Go for cardiac testing and MRI of liver

## 2021-09-14 NOTE — PROGRESS NOTES
Assessment/Plan:    Problem List Items Addressed This Visit        Respiratory    Asthma     Continue albuterol p r n  Relevant Medications    albuterol (PROVENTIL HFA,VENTOLIN HFA) 90 mcg/act inhaler       Cardiovascular and Mediastinum    Essential hypertension - Primary     Increase losartan to 50 mg daily  Continue amlodipine 10 mg daily         Relevant Medications    amLODIPine (NORVASC) 10 mg tablet    losartan (COZAAR) 50 mg tablet       Nervous and Auditory    Bilateral carpal tunnel syndrome     Status post surgical release of right wrist  Needs follow-up with Ortho for left wrist            Other    Wheezing    Relevant Medications    albuterol (PROVENTIL HFA,VENTOLIN HFA) 90 mcg/act inhaler    Liver lesion, right lobe     Check MRI  Order reprinted         Relevant Orders    MRI abdomen w wo contrast    Comprehensive metabolic panel    History of diabetes mellitus, type II    Relevant Orders    Hemoglobin A1C    Comprehensive metabolic panel      Other Visit Diagnoses     Chronic wrist pain, left        pt is s/p carpal tunnel surgery in this hand  short course nsaid with food   f/u with ortho if needed     Relevant Medications    meloxicam (MOBIC) 15 mg tablet    Screening for lipid disorders        Relevant Orders    Lipid Panel with Direct LDL reflex    CK (with reflex to MB)    Screening for deficiency anemia        Relevant Orders    CBC          BMI Counseling: Body mass index is 33 75 kg/m²  Discussed the patient's BMI with him  The BMI is above normal  Nutrition recommendations include 3-5 servings of fruits/vegetables daily, moderation in carbohydrate intake and increasing intake of lean protein  Exercise recommendations include moderate aerobic physical activity for 150 minutes/week  [employer] software was used to dictate this note  It may contain errors with dictating incorrect words or incorrect spelling  Please contact the provider directly with any questions      Subjective: Patient ID: Kianna Reeves is a 39 y o  male  HPI     Patient presents today for 6 month follow up  No recent labs      HTN - He is currently on amlodipine 10mg daily and losartan 25mg daily  He is not checking his BP at home   He denies any chest pain, vision changes or headaches       Asthma - triggers are weather changes  No chest tightness, cough or SOB    Patient was referred to Cardiology and established with them on 03/30/2021 due to his family history of sudden cardiac death in his brother as well as abnormal EKG showing first-degree AV block  Cardiology recommended better blood pressure control as well as checking an echo and exercise stress test   Patient has not gone for this testing yet  He also has a known liver lesion which was unable to be characterized on ultrasound and recommended MRI follow-up which was ordered for him a year ago but he has not gone for the MRI  He is status post surgical correction of carpal tunnel of his right wrist and is looking into having his left wrist done as well  He is to established with ortho    The following portions of the patient's history were reviewed and updated as appropriate: allergies, current medications, past family history, past medical history, past social history, past surgical history and problem list     Review of Systems   Constitutional: Negative for chills and fever  Eyes: Negative for visual disturbance  Respiratory: Positive for wheezing (intermittent)  Negative for cough, chest tightness and shortness of breath  Cardiovascular: Negative for chest pain, palpitations and leg swelling  Musculoskeletal: Positive for arthralgias  Neurological: Negative for headaches           Past Medical History:   Diagnosis Date    Adult situational stress disorder     Asthma     Illness induced    Diabetes mellitus (HCC)     Diet Controlled    GERD (gastroesophageal reflux disease)     Hypertension     Trigger ring finger of right hand 5/6/2019    Type 2 diabetes mellitus without complication, with long-term current use of insulin ([address] ) 3/2/2018         Current Outpatient Medications:     albuterol (PROVENTIL HFA,VENTOLIN HFA) 90 mcg/act inhaler, Inhale 2 puffs every 6 (six) hours as needed for wheezing or shortness of breath, Disp: 18 g, Rfl: 1    amLODIPine (NORVASC) 10 mg tablet, Take 1 tablet (10 mg total) by mouth daily, Disp: 90 tablet, Rfl: 1    bismuth subsalicylate (PEPTO BISMOL) 524 mg/30 mL oral suspension, Take 15 mL by mouth every 6 (six) hours as needed for indigestion, Disp: , Rfl:     losartan (COZAAR) 50 mg tablet, Take 1 tablet (50 mg total) by mouth daily, Disp: 90 tablet, Rfl: 1    meloxicam (MOBIC) 15 mg tablet, Take 1 tablet (15 mg total) by mouth daily, Disp: 30 tablet, Rfl: 0    Allergies   Allergen Reactions    Dust Mite Extract Other (See Comments) and Nasal Congestion     Itchy eyes    Pollen Extract Other (See Comments) and Nasal Congestion     Itchy eyes       Social History   Past Surgical History:   Procedure Laterality Date    NO PAST SURGERIES      MD INCISE FINGER TENDON SHEATH Right 6/11/2019    Procedure: RING TRIGGER FINGER RELEASE;  Surgeon: He Roger MD;  [facility];  Service: Orthopedics    MD REVISE MEDIAN N/CARPAL TUNNEL SURG Right 6/11/2019    Procedure: CARPAL TUNNEL RELEASE;  Surgeon: He Roger MD;  [facility];  Service: Orthopedics     Family History   Problem Relation Age of Onset    Asthma Mother     Diabetes Mother     Hypertension Mother     Diabetes Father     Hypertension Father        Objective:  /80 (BP Location: Left arm, Patient Position: Sitting, Cuff Size: Adult)   Pulse 80   Temp 97 9 °F (36 6 °C) (Tympanic)   Ht 5' 11" (1 803 m)   Wt 110 kg (242 lb)   SpO2 99%   BMI 33 75 kg/m²      Physical Exam  Vitals reviewed  Constitutional:       General: He is not in acute distress  Appearance: Normal appearance  He is obese  He is not diaphoretic  HENT:      Head: Normocephalic and atraumatic  Eyes:      Extraocular Movements: Extraocular movements intact  Conjunctiva/sclera: Conjunctivae normal       Pupils: Pupils are equal, round, and reactive to light  Cardiovascular:      Rate and Rhythm: Normal rate and regular rhythm  Heart sounds: Normal heart sounds  No murmur heard  Pulmonary:      Effort: Pulmonary effort is normal  No respiratory distress  Breath sounds: Normal breath sounds  No wheezing, rhonchi or rales  Musculoskeletal:      Right lower leg: No edema  Left lower leg: No edema  Neurological:      Mental Status: He is alert and oriented to person, place, and time  Mental status is at baseline     Psychiatric:         Mood and Affect: Mood normal          Behavior: Behavior normal

## 2021-11-03 ENCOUNTER — VBI (OUTPATIENT)
Dept: ADMINISTRATIVE | Facility: OTHER | Age: 41
End: 2021-11-03

## 2022-05-11 ENCOUNTER — HOSPITAL ENCOUNTER (OUTPATIENT)
Dept: MRI IMAGING | Facility: HOSPITAL | Age: 42
Discharge: HOME | End: 2022-05-11
Payer: COMMERCIAL
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
## 2022-05-11 PROCEDURE — 72148 MRI LUMBAR SPINE W/O DYE: CPT

## 2022-10-07 ENCOUNTER — OFFICE VISIT (OUTPATIENT)
Dept: INTERNAL MEDICINE CLINIC | Facility: OTHER | Age: 42
End: 2022-10-07
Payer: COMMERCIAL

## 2022-10-07 VITALS
TEMPERATURE: 98 F | BODY MASS INDEX: 35.36 KG/M2 | HEART RATE: 81 BPM | OXYGEN SATURATION: 99 % | HEIGHT: 70 IN | DIASTOLIC BLOOD PRESSURE: 98 MMHG | SYSTOLIC BLOOD PRESSURE: 156 MMHG | WEIGHT: 247 LBS

## 2022-10-07 DIAGNOSIS — R25.1 SHAKING: ICD-10-CM

## 2022-10-07 DIAGNOSIS — Z86.39 HISTORY OF DIABETES MELLITUS, TYPE II: ICD-10-CM

## 2022-10-07 DIAGNOSIS — Z13.0 SCREENING, ANEMIA, DEFICIENCY, IRON: ICD-10-CM

## 2022-10-07 DIAGNOSIS — R61 EXCESSIVE SWEATING: ICD-10-CM

## 2022-10-07 DIAGNOSIS — I10 ESSENTIAL HYPERTENSION: Primary | ICD-10-CM

## 2022-10-07 DIAGNOSIS — Z13.220 SCREENING, LIPID: ICD-10-CM

## 2022-10-07 DIAGNOSIS — R06.2 WHEEZING: ICD-10-CM

## 2022-10-07 DIAGNOSIS — Z13.220 SCREENING FOR LIPID DISORDERS: ICD-10-CM

## 2022-10-07 DIAGNOSIS — J45.20 MILD INTERMITTENT ASTHMA, UNSPECIFIED WHETHER COMPLICATED: ICD-10-CM

## 2022-10-07 DIAGNOSIS — Z13.1 SCREENING FOR DIABETES MELLITUS: ICD-10-CM

## 2022-10-07 LAB — SL AMB POCT HEMOGLOBIN AIC: 5.4 (ref ?–6.5)

## 2022-10-07 PROCEDURE — 83036 HEMOGLOBIN GLYCOSYLATED A1C: CPT | Performed by: NURSE PRACTITIONER

## 2022-10-07 PROCEDURE — 99214 OFFICE O/P EST MOD 30 MIN: CPT | Performed by: NURSE PRACTITIONER

## 2022-10-07 RX ORDER — MELOXICAM 15 MG/1
15 TABLET ORAL DAILY
Qty: 30 TABLET | Refills: 0 | Status: CANCELLED | OUTPATIENT
Start: 2022-10-07

## 2022-10-07 RX ORDER — ALBUTEROL SULFATE 90 UG/1
2 AEROSOL, METERED RESPIRATORY (INHALATION) EVERY 6 HOURS PRN
Qty: 18 G | Refills: 1 | Status: SHIPPED | OUTPATIENT
Start: 2022-10-07

## 2022-10-07 RX ORDER — AMLODIPINE BESYLATE 10 MG/1
10 TABLET ORAL DAILY
Qty: 90 TABLET | Refills: 1 | Status: SHIPPED | OUTPATIENT
Start: 2022-10-07

## 2022-10-07 RX ORDER — BLOOD SUGAR DIAGNOSTIC
1 STRIP MISCELLANEOUS DAILY
Qty: 100 EACH | Refills: 0 | Status: SHIPPED | OUTPATIENT
Start: 2022-10-07

## 2022-10-07 RX ORDER — LOSARTAN POTASSIUM 50 MG/1
50 TABLET ORAL DAILY
Qty: 90 TABLET | Refills: 1 | Status: SHIPPED | OUTPATIENT
Start: 2022-10-07

## 2022-10-07 RX ORDER — LANCETS
EACH MISCELLANEOUS DAILY
Qty: 100 EACH | Refills: 0 | Status: SHIPPED | OUTPATIENT
Start: 2022-10-07

## 2022-10-07 NOTE — ASSESSMENT & PLAN NOTE
- hemoglobin A1c in office 5 4  - I discussed with patient he is no longer diabetic as he has not been for the last year so

## 2022-10-07 NOTE — PATIENT INSTRUCTIONS
- call to schedule echo and stress test   - follow up with cardiology   - keep diary of symptoms, when shaking and sweating occur, please document blood sugar and blood pressure

## 2022-10-07 NOTE — ASSESSMENT & PLAN NOTE
- restart previous regimen of amlodipine 10 mg and losartan 50 mg daily  - follow-up in 4 weeks  - check labs

## 2022-10-07 NOTE — PROGRESS NOTES
Assessment/Plan:    Problem List Items Addressed This Visit        Respiratory    Asthma     - continue albuterol p r n  Relevant Medications    albuterol (PROVENTIL HFA,VENTOLIN HFA) 90 mcg/act inhaler       Cardiovascular and Mediastinum    Essential hypertension - Primary     - restart previous regimen of amlodipine 10 mg and losartan 50 mg daily  - follow-up in 4 weeks  - check labs         Relevant Medications    amLODIPine (NORVASC) 10 mg tablet    losartan (COZAAR) 50 mg tablet    Other Relevant Orders    Comprehensive metabolic panel    TSH, 3rd generation with Free T4 reflex    Lipid Panel with Direct LDL reflex    UA w Reflex to Microscopic w Reflex to Culture -Lab Collect       Other    Wheezing    Relevant Medications    albuterol (PROVENTIL HFA,VENTOLIN HFA) 90 mcg/act inhaler    History of diabetes mellitus, type II     - hemoglobin A1c in office 5 4  - I discussed with patient he is no longer diabetic as he has not been for the last year so         Relevant Medications    glucose blood (OneTouch Ultra) test strip    Lancets (onetouch ultrasoft) lancets    Shaking     - patient is reporting episodes of waking up sweating and shaking  Symptoms occur about 2 times per month  He has been monitoring his blood sugar and reports blood sugars ranging from 112-275 fasting  His hemoglobin A1c in the office is 5 4    - he states he sweats frequently throughout the day  - he is also having headaches but has been office antihypertensive for the last 6 months  - recommend restarting his losartan and amlodipine  - check routine labs including thyroid  - advised to keep a diary of symptoms  When symptoms occur recommend checking blood pressure and blood sugar    Bring diary with to follow-up         Relevant Medications    glucose blood (OneTouch Ultra) test strip    Lancets (onetouch ultrasoft) lancets      Other Visit Diagnoses     Excessive sweating        Relevant Orders    TSH, 3rd generation with Free T4 reflex    Screening for lipid disorders        Screening for diabetes mellitus        Relevant Orders    Comprehensive metabolic panel    Screening, anemia, deficiency, iron        Relevant Orders    CBC and differential    Screening, lipid        Relevant Orders    Lipid Panel with Direct LDL reflex             M*Modal software was used to dictate this note  It may contain errors with dictating incorrect words or incorrect spelling  Please contact the provider directly with any questions  Subjective:      Patient ID: Elva Cm is a 43 y o  male  HPI     Patient presents today with concern for shaking and sweats in the mornings  He states it may also occur in the evening before bed  He is monitoring his blood sugar at home  He states that his blood sugar ranges from 112-275  Onset of symptoms was about 3 months ago  More recently he has been waking up frequently sweating  Episodes of hyperglycemia occur about twice a month  He has been off his blood pressure medication for the last 6 months  He was previously on amlodipine 10mg and losartan 50mg   He is complaining of frequent headache  Denies any chest pain  He is using his albuterol about twice a week  Symptoms are worse with weather changes  The following portions of the patient's history were reviewed and updated as appropriate: allergies, current medications, past family history, past medical history, past social history, past surgical history and problem list     Review of Systems   Constitutional: Positive for diaphoresis  Respiratory: Positive for wheezing  Negative for cough, chest tightness and shortness of breath  Cardiovascular: Negative for chest pain and palpitations  Gastrointestinal: Negative for constipation, diarrhea, nausea and vomiting  Neurological: Positive for tremors and headaches           Past Medical History:   Diagnosis Date    Adult situational stress disorder     Asthma     Illness induced    Diabetes mellitus ([facility])     Diet Controlled    GERD (gastroesophageal reflux disease)     Hypertension     Trigger ring finger of right hand 5/6/2019    Type 2 diabetes mellitus without complication, with long-term current use of insulin ([facility]) 3/2/2018         Current Outpatient Medications:     albuterol (PROVENTIL HFA,VENTOLIN HFA) 90 mcg/act inhaler, Inhale 2 puffs every 6 (six) hours as needed for wheezing or shortness of breath, Disp: 18 g, Rfl: 1    amLODIPine (NORVASC) 10 mg tablet, Take 1 tablet (10 mg total) by mouth daily, Disp: 90 tablet, Rfl: 1    bismuth subsalicylate (PEPTO BISMOL) 524 mg/30 mL oral suspension, Take 15 mL by mouth every 6 (six) hours as needed for indigestion, Disp: , Rfl:     glucose blood (OneTouch Ultra) test strip, Use 1 each in the morning Use as instructed, Disp: 100 each, Rfl: 0    Lancets (onetouch ultrasoft) lancets, Use in the morning Use as instructed, Disp: 100 each, Rfl: 0    losartan (COZAAR) 50 mg tablet, Take 1 tablet (50 mg total) by mouth daily, Disp: 90 tablet, Rfl: 1    Allergies   Allergen Reactions    Dust Mite Extract Other (See Comments) and Nasal Congestion     Itchy eyes    Pollen Extract Other (See Comments) and Nasal Congestion     Itchy eyes       Social History   Past Surgical History:   Procedure Laterality Date    NO PAST SURGERIES      TX INCISE FINGER TENDON SHEATH Right 6/11/2019    Procedure: RING TRIGGER FINGER RELEASE;  Surgeon: Scotty Ocampo MD;  [facility];  Service: Orthopedics    TX REVISE MEDIAN N/CARPAL TUNNEL SURG Right 6/11/2019    Procedure: CARPAL TUNNEL RELEASE;  Surgeon: Scotty Ocampo MD;  [facility];  Service: Orthopedics     Family History   Problem Relation Age of Onset    Asthma Mother     Diabetes Mother     Hypertension Mother     Diabetes Father     Hypertension Father        Objective:  /98 (BP Location: Left arm, Patient Position: Sitting, Cuff Size: Large) Pulse 81   Temp 98 °F (36 7 °C) (Temporal)   Ht 5' 10" (1 778 m)   Wt 112 kg (247 lb)   SpO2 99%   BMI 35 44 kg/m²      Physical Exam  Vitals reviewed  Constitutional:       General: He is not in acute distress  Appearance: Normal appearance  He is not diaphoretic  HENT:      Head: Normocephalic and atraumatic  Eyes:      Conjunctiva/sclera: Conjunctivae normal    Pulmonary:      Effort: Pulmonary effort is normal  No respiratory distress  Skin:     General: Skin is warm and dry  Neurological:      Mental Status: He is alert and oriented to person, place, and time  Mental status is at baseline        Gait: Gait normal    Psychiatric:         Mood and Affect: Mood normal          Behavior: Behavior normal

## 2022-10-07 NOTE — ASSESSMENT & PLAN NOTE
- patient is reporting episodes of waking up sweating and shaking  Symptoms occur about 2 times per month  He has been monitoring his blood sugar and reports blood sugars ranging from 112-275 fasting  His hemoglobin A1c in the office is 5 4    - he states he sweats frequently throughout the day  - he is also having headaches but has been office antihypertensive for the last 6 months  - recommend restarting his losartan and amlodipine  - check routine labs including thyroid  - advised to keep a diary of symptoms  When symptoms occur recommend checking blood pressure and blood sugar    Bring diary with to follow-up

## 2022-11-03 ENCOUNTER — APPOINTMENT (OUTPATIENT)
Dept: LAB | Facility: HOSPITAL | Age: 42
End: 2022-11-03

## 2022-11-03 ENCOUNTER — RA CDI HCC (OUTPATIENT)
Dept: OTHER | Facility: HOSPITAL | Age: 42
End: 2022-11-03

## 2022-11-03 DIAGNOSIS — Z13.220 SCREENING, LIPID: ICD-10-CM

## 2022-11-03 DIAGNOSIS — I10 ESSENTIAL HYPERTENSION: ICD-10-CM

## 2022-11-03 DIAGNOSIS — Z13.0 SCREENING, ANEMIA, DEFICIENCY, IRON: ICD-10-CM

## 2022-11-03 DIAGNOSIS — R61 EXCESSIVE SWEATING: ICD-10-CM

## 2022-11-03 DIAGNOSIS — Z13.1 SCREENING FOR DIABETES MELLITUS: ICD-10-CM

## 2022-11-03 LAB
ALBUMIN SERPL BCP-MCNC: 3.8 G/DL (ref 3.5–5)
ALP SERPL-CCNC: 83 U/L (ref 46–116)
ALT SERPL W P-5'-P-CCNC: 28 U/L (ref 12–78)
ANION GAP SERPL CALCULATED.3IONS-SCNC: 4 MMOL/L (ref 4–13)
AST SERPL W P-5'-P-CCNC: 14 U/L (ref 5–45)
BACTERIA UR QL AUTO: ABNORMAL /HPF
BASOPHILS # BLD AUTO: 0.01 THOUSANDS/ÂΜL (ref 0–0.1)
BASOPHILS NFR BLD AUTO: 0 % (ref 0–1)
BILIRUB SERPL-MCNC: 0.81 MG/DL (ref 0.2–1)
BILIRUB UR QL STRIP: NEGATIVE
BUN SERPL-MCNC: 13 MG/DL (ref 5–25)
CALCIUM SERPL-MCNC: 9.3 MG/DL (ref 8.3–10.1)
CHLORIDE SERPL-SCNC: 109 MMOL/L (ref 96–108)
CHOLEST SERPL-MCNC: 164 MG/DL
CLARITY UR: ABNORMAL
CO2 SERPL-SCNC: 26 MMOL/L (ref 21–32)
COLOR UR: YELLOW
CREAT SERPL-MCNC: 1.12 MG/DL (ref 0.6–1.3)
EOSINOPHIL # BLD AUTO: 0.34 THOUSAND/ÂΜL (ref 0–0.61)
EOSINOPHIL NFR BLD AUTO: 5 % (ref 0–6)
ERYTHROCYTE [DISTWIDTH] IN BLOOD BY AUTOMATED COUNT: 13.2 % (ref 11.6–15.1)
GFR SERPL CREATININE-BSD FRML MDRD: 80 ML/MIN/1.73SQ M
GLUCOSE P FAST SERPL-MCNC: 74 MG/DL (ref 65–99)
GLUCOSE UR STRIP-MCNC: NEGATIVE MG/DL
HCT VFR BLD AUTO: 39.8 % (ref 36.5–49.3)
HDLC SERPL-MCNC: 36 MG/DL
HGB BLD-MCNC: 13.7 G/DL (ref 12–17)
HGB UR QL STRIP.AUTO: ABNORMAL
IMM GRANULOCYTES # BLD AUTO: 0.02 THOUSAND/UL (ref 0–0.2)
IMM GRANULOCYTES NFR BLD AUTO: 0 % (ref 0–2)
KETONES UR STRIP-MCNC: NEGATIVE MG/DL
LDLC SERPL CALC-MCNC: 114 MG/DL (ref 0–100)
LEUKOCYTE ESTERASE UR QL STRIP: NEGATIVE
LYMPHOCYTES # BLD AUTO: 1.98 THOUSANDS/ÂΜL (ref 0.6–4.47)
LYMPHOCYTES NFR BLD AUTO: 27 % (ref 14–44)
MCH RBC QN AUTO: 27.7 PG (ref 26.8–34.3)
MCHC RBC AUTO-ENTMCNC: 34.4 G/DL (ref 31.4–37.4)
MCV RBC AUTO: 80 FL (ref 82–98)
MONOCYTES # BLD AUTO: 1.14 THOUSAND/ÂΜL (ref 0.17–1.22)
MONOCYTES NFR BLD AUTO: 15 % (ref 4–12)
NEUTROPHILS # BLD AUTO: 3.97 THOUSANDS/ÂΜL (ref 1.85–7.62)
NEUTS SEG NFR BLD AUTO: 53 % (ref 43–75)
NITRITE UR QL STRIP: NEGATIVE
NON-SQ EPI CELLS URNS QL MICRO: ABNORMAL /HPF
NRBC BLD AUTO-RTO: 0 /100 WBCS
PH UR STRIP.AUTO: 6 [PH]
PLATELET # BLD AUTO: 267 THOUSANDS/UL (ref 149–390)
PMV BLD AUTO: 10 FL (ref 8.9–12.7)
POTASSIUM SERPL-SCNC: 3.8 MMOL/L (ref 3.5–5.3)
PROT SERPL-MCNC: 7.7 G/DL (ref 6.4–8.4)
PROT UR STRIP-MCNC: NEGATIVE MG/DL
RBC # BLD AUTO: 4.95 MILLION/UL (ref 3.88–5.62)
RBC #/AREA URNS AUTO: ABNORMAL /HPF
SODIUM SERPL-SCNC: 139 MMOL/L (ref 135–147)
SP GR UR STRIP.AUTO: 1.02 (ref 1–1.03)
TRIGL SERPL-MCNC: 71 MG/DL
TSH SERPL DL<=0.05 MIU/L-ACNC: 1.06 UIU/ML (ref 0.45–4.5)
UROBILINOGEN UR QL STRIP.AUTO: 0.2 E.U./DL
WBC # BLD AUTO: 7.46 THOUSAND/UL (ref 4.31–10.16)
WBC #/AREA URNS AUTO: ABNORMAL /HPF

## 2022-11-03 NOTE — PROGRESS NOTES
[facility] [place] [facility]  coding opportunities       Chart reviewed, no opportunity found: CHART REVIEWED, NO OPPORTUNITY FOUND        Patients Insurance        Commercial Insurance: [address] [place] [address]

## 2022-11-04 ENCOUNTER — OFFICE VISIT (OUTPATIENT)
Dept: INTERNAL MEDICINE CLINIC | Facility: OTHER | Age: 42
End: 2022-11-04

## 2022-11-04 VITALS
HEIGHT: 70 IN | TEMPERATURE: 98.5 F | HEART RATE: 80 BPM | SYSTOLIC BLOOD PRESSURE: 130 MMHG | BODY MASS INDEX: 35.42 KG/M2 | WEIGHT: 247.4 LBS | DIASTOLIC BLOOD PRESSURE: 80 MMHG | OXYGEN SATURATION: 98 %

## 2022-11-04 DIAGNOSIS — I10 ESSENTIAL HYPERTENSION: Primary | ICD-10-CM

## 2022-11-04 DIAGNOSIS — R25.1 SHAKING: ICD-10-CM

## 2022-11-04 DIAGNOSIS — I44.0 AV BLOCK, 1ST DEGREE: ICD-10-CM

## 2022-11-04 DIAGNOSIS — Z82.41 FAMILY HISTORY OF SUDDEN CARDIAC DEATH IN BROTHER: ICD-10-CM

## 2022-11-04 DIAGNOSIS — R94.31 ABNORMAL EKG: ICD-10-CM

## 2022-11-04 NOTE — ASSESSMENT & PLAN NOTE
- patient denies any cardiac symptoms at this time  - given his family history of sudden cardiac death in his brother as well as first-degree AV block and an abnormal EKG he was referred to Cardiology who had ordered an exercise stress test and echo in 2021  - orders are likely  at this time so we place new orders for echo and stress test  - follow-up 1 month

## 2022-11-04 NOTE — PROGRESS NOTES
Assessment/Plan:    Problem List Items Addressed This Visit        Cardiovascular and Mediastinum    Essential hypertension - Primary     - blood pressure controlled on amlodipine 10 mg and losartan 50 mg         Relevant Orders    Stress test only, exercise    Echo complete w/ contrast if indicated    AV block, 1st degree    Relevant Orders    Stress test only, exercise    Echo complete w/ contrast if indicated       Other    Shaking     - patient continues with intermittent episodes of bilateral hand shaking  He reports that occurs in the morning typically after not eating  Feels that his blood sugars dropping  His hemoglobin A1c in the office at last visit was 5 4  He was advised to monitor his blood sugars at home during these episodes but lost his glucometer    - labs are stable, fasting blood sugar at 1130 in the morning was 74  - glucometer sample given to patient, advised to monitor fasting blood sugar as well as with any episodes of shaking  - follow-up in 1 month  - discussed importance of regular diet including 3 meals and snacks throughout the day  Discussed importance of complex carbs and protein  - adequate hydration         Family history of sudden cardiac death in brother     - patient denies any cardiac symptoms at this time  - given his family history of sudden cardiac death in his brother as well as first-degree AV block and an abnormal EKG he was referred to Cardiology who had ordered an exercise stress test and echo in 2021  - orders are likely  at this time so we place new orders for echo and stress test  - follow-up 1 month         Relevant Orders    Stress test only, exercise    Echo complete w/ contrast if indicated    Abnormal EKG    Relevant Orders    Stress test only, exercise    Echo complete w/ contrast if indicated          BMI Counseling: Body mass index is 35 5 kg/m²  Discussed the patient's BMI with him   The BMI is above normal  Nutrition recommendations include 3-5 servings of fruits/vegetables daily, moderation in carbohydrate intake, increasing intake of lean protein and reducing intake of saturated fat and trans fat  Exercise recommendations include moderate aerobic physical activity for 150 minutes/week  M*Modal software was used to dictate this note  It may contain errors with dictating incorrect words or incorrect spelling  Please contact the provider directly with any questions  Subjective:      Patient ID: Brayan Mccullough is a 43 y o  male  HPI    Patient presents today for 4 week follow up  Labs completed   His blood pressure is well controlled on amlodipine 10mg and losartan 50mg daily  He is monitoring his BP periodically at [facility] which is well controlled 130s/80s  He has not been able to monitor his blood sugar because he lost his glucometer  He continues to have episodes of hands shaking several times a week in the morning  He notices when he only has water in the morning and forgets to eat, by the time he gets to work he feels like his hands are shaking and then he will eat something and symptoms do improve  The 10-year ASCVD risk score (Peng Currie et al , 2013) is: 6 2%    Values used to calculate the score:      Age: 43 years      Sex: Male      Is Non- : Yes      Diabetic: No      Tobacco smoker: No      Systolic Blood Pressure: 360 mmHg      Is BP treated: Yes      HDL Cholesterol: 36 mg/dL      Total Cholesterol: 164 mg/dL    The following portions of the patient's history were reviewed and updated as appropriate: allergies, current medications, past family history, past medical history, past social history, past surgical history and problem list     Review of Systems   Constitutional: Negative for activity change, appetite change and unexpected weight change  Respiratory: Negative for cough, chest tightness, shortness of breath and wheezing      Cardiovascular: Negative for chest pain, palpitations and leg swelling  Neurological: Negative for dizziness and syncope          Shaking         Past Medical History:   Diagnosis Date   • Adult situational stress disorder    • Asthma     Illness induced   • Diabetes mellitus ([address] )     Diet Controlled   • GERD (gastroesophageal reflux disease)    • Hypertension    • Trigger ring finger of right hand 5/6/2019   • Type 2 diabetes mellitus without complication, with long-term current use of insulin ([facility]) 3/2/2018         Current Outpatient Medications:   •  albuterol (PROVENTIL HFA,VENTOLIN HFA) 90 mcg/act inhaler, Inhale 2 puffs every 6 (six) hours as needed for wheezing or shortness of breath, Disp: 18 g, Rfl: 1  •  amLODIPine (NORVASC) 10 mg tablet, Take 1 tablet (10 mg total) by mouth daily, Disp: 90 tablet, Rfl: 1  •  glucose blood (OneTouch Ultra) test strip, Use 1 each in the morning Use as instructed, Disp: 100 each, Rfl: 0  •  Lancets (onetouch ultrasoft) lancets, Use in the morning Use as instructed, Disp: 100 each, Rfl: 0  •  losartan (COZAAR) 50 mg tablet, Take 1 tablet (50 mg total) by mouth daily, Disp: 90 tablet, Rfl: 1  •  bismuth subsalicylate (PEPTO BISMOL) 524 mg/30 mL oral suspension, Take 15 mL by mouth every 6 (six) hours as needed for indigestion (Patient not taking: Reported on 11/4/2022), Disp: , Rfl:     Allergies   Allergen Reactions   • Dust Mite Extract Other (See Comments) and Nasal Congestion     Itchy eyes   • Pollen Extract Other (See Comments) and Nasal Congestion     Itchy eyes       Social History   Past Surgical History:   Procedure Laterality Date   • NO PAST SURGERIES     • IN INCISE FINGER TENDON SHEATH Right 6/11/2019    Procedure: RING TRIGGER FINGER RELEASE;  Surgeon: Melissa Singh MD;  [facility];  Service: Orthopedics   • IN REVISE MEDIAN N/CARPAL TUNNEL SURG Right 6/11/2019    Procedure: CARPAL TUNNEL RELEASE;  Surgeon: Melissa Singh MD;  [facility];  Service: Orthopedics Family History   Problem Relation Age of Onset   • Asthma Mother    • Diabetes Mother    • Hypertension Mother    • Diabetes Father    • Hypertension Father        Objective:  /80 (BP Location: Right arm, Patient Position: Sitting, Cuff Size: Standard)   Pulse 80   Temp 98 5 °F (36 9 °C) (Temporal)   Ht 5' 10" (1 778 m)   Wt 112 kg (247 lb 6 4 oz)   SpO2 98% Comment: room air  BMI 35 50 kg/m²      Physical Exam  Vitals reviewed  Constitutional:       General: He is not in acute distress  Appearance: Normal appearance  He is obese  He is not diaphoretic  HENT:      Head: Normocephalic and atraumatic  Eyes:      Extraocular Movements: Extraocular movements intact  Conjunctiva/sclera: Conjunctivae normal       Pupils: Pupils are equal, round, and reactive to light  Cardiovascular:      Rate and Rhythm: Normal rate and regular rhythm  Heart sounds: Normal heart sounds  No murmur heard  Pulmonary:      Effort: Pulmonary effort is normal  No respiratory distress  Breath sounds: Normal breath sounds  No wheezing, rhonchi or rales  Neurological:      Mental Status: He is alert and oriented to person, place, and time  Mental status is at baseline  Motor: No weakness        Gait: Gait normal    Psychiatric:         Mood and Affect: Mood normal          Behavior: Behavior normal

## 2022-11-04 NOTE — PATIENT INSTRUCTIONS
Start monitoring your blood sugar daily, fasting  Check BS with any shaking episodes    Eat 3 meals a day and a few small snacks, diet high in protein  Drink plenty of water

## 2022-11-04 NOTE — ASSESSMENT & PLAN NOTE
- patient continues with intermittent episodes of bilateral hand shaking  He reports that occurs in the morning typically after not eating  Feels that his blood sugars dropping  His hemoglobin A1c in the office at last visit was 5 4  He was advised to monitor his blood sugars at home during these episodes but lost his glucometer    - labs are stable, fasting blood sugar at 1130 in the morning was 74  - glucometer sample given to patient, advised to monitor fasting blood sugar as well as with any episodes of shaking  - follow-up in 1 month  - discussed importance of regular diet including 3 meals and snacks throughout the day  Discussed importance of complex carbs and protein     - adequate hydration

## 2023-10-09 ENCOUNTER — OFFICE VISIT (OUTPATIENT)
Dept: PAIN MEDICINE | Facility: CLINIC | Age: 43
End: 2023-10-09
Payer: COMMERCIAL

## 2023-10-09 VITALS
BODY MASS INDEX: 35.36 KG/M2 | HEIGHT: 70 IN | DIASTOLIC BLOOD PRESSURE: 80 MMHG | WEIGHT: 247 LBS | SYSTOLIC BLOOD PRESSURE: 130 MMHG

## 2023-10-09 DIAGNOSIS — M54.16 LUMBAR RADICULOPATHY: Primary | ICD-10-CM

## 2023-10-09 DIAGNOSIS — M79.18 MYOFASCIAL PAIN SYNDROME: ICD-10-CM

## 2023-10-09 PROCEDURE — 99204 OFFICE O/P NEW MOD 45 MIN: CPT | Performed by: ANESTHESIOLOGY
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
## 2023-10-20 ENCOUNTER — OFFICE VISIT (OUTPATIENT)
Age: 43
End: 2023-10-20
Payer: COMMERCIAL

## 2023-10-20 VITALS — BODY MASS INDEX: 35.36 KG/M2 | WEIGHT: 247 LBS | HEIGHT: 70 IN

## 2023-10-20 DIAGNOSIS — M79.18 MYOFASCIAL PAIN SYNDROME: ICD-10-CM

## 2023-10-20 DIAGNOSIS — M54.16 LUMBAR RADICULOPATHY: ICD-10-CM

## 2023-10-20 PROCEDURE — 99203 OFFICE O/P NEW LOW 30 MIN: CPT | Performed by: CHIROPRACTOR

## 2023-10-20 PROCEDURE — 98941 CHIROPRACT MANJ 3-4 REGIONS: CPT | Performed by: CHIROPRACTOR

## 2023-11-09 ENCOUNTER — PROCEDURE VISIT (OUTPATIENT)
Age: 43
End: 2023-11-09
Payer: COMMERCIAL

## 2023-11-09 ENCOUNTER — TELEPHONE (OUTPATIENT)
Age: 43
End: 2023-11-09

## 2023-11-09 VITALS — WEIGHT: 247 LBS | BODY MASS INDEX: 35.36 KG/M2 | HEIGHT: 70 IN

## 2023-11-09 DIAGNOSIS — M54.16 LUMBAR RADICULOPATHY: Primary | ICD-10-CM

## 2023-11-09 DIAGNOSIS — M79.18 MYOFASCIAL PAIN SYNDROME: ICD-10-CM

## 2023-11-09 PROCEDURE — 98941 CHIROPRACT MANJ 3-4 REGIONS: CPT | Performed by: CHIROPRACTOR
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
## 2023-11-12 NOTE — PROGRESS NOTES
HPI:  Patient returns today for treatment of low back pain spasm bilateral hip pain mid back pain. Notes that he feels knots throughout his lower back tightness into the right hamstring. The following portions of the patient's history were reviewed and updated as appropriate: allergies, current medications, past family history, past medical history, past social history, past surgical history, and problem list.    Review of Systems    Physical Exam:  Exam reveals pelvic obliquity elevated left versus right innominate leg with inequality biomechanical joint dysfunction present right SI involvement L4-L5 L5-S1 tender to palpation reduced range of motion. Exam reveals hypertonicity paracervical musculature joint dysfunction T4-T5 C5-C6    Assessment:   Diagnosis ICD-10-CM Associated Orders   1. Lumbar radiculopathy  M54.16       2. Myofascial pain syndrome  M79.18                 Treatment: 95916  Manipulation right innominate, sacrum, L5 well-tolerated. Manipulation T4 well-tolerated. Discussion:  Continue daily stretching ice immediately upon returning home 15 minutes lower back he has a good understanding.

## 2023-11-15 ENCOUNTER — PROCEDURE VISIT (OUTPATIENT)
Age: 43
End: 2023-11-15
Payer: COMMERCIAL

## 2023-11-15 VITALS
DIASTOLIC BLOOD PRESSURE: 87 MMHG | BODY MASS INDEX: 35.36 KG/M2 | WEIGHT: 247 LBS | HEIGHT: 70 IN | SYSTOLIC BLOOD PRESSURE: 151 MMHG

## 2023-11-15 DIAGNOSIS — M54.16 LUMBAR RADICULOPATHY: Primary | ICD-10-CM

## 2023-11-15 DIAGNOSIS — M79.18 MYOFASCIAL PAIN SYNDROME: ICD-10-CM

## 2023-11-15 PROCEDURE — 98941 CHIROPRACT MANJ 3-4 REGIONS: CPT | Performed by: CHIROPRACTOR

## 2023-11-15 NOTE — PROGRESS NOTES
HPI:  Patient returns today for treatment of low back pain spasm bilateral hip pain mid back pain. Notes that he feels knots throughout his lower back tightness into the right hamstring. The following portions of the patient's history were reviewed and updated as appropriate: allergies, current medications, past family history, past medical history, past social history, past surgical history, and problem list.    Review of Systems    Physical Exam:  Exam reveals pelvic obliquity elevated left versus right innominate leg with inequality biomechanical joint dysfunction present right SI involvement L4-L5 L5-S1 tender to palpation reduced range of motion. Exam reveals hypertonicity paracervical musculature joint dysfunction T4-T5 C5-C6    Assessment:   Diagnosis ICD-10-CM Associated Orders   1. Lumbar radiculopathy  M54.16       2. Myofascial pain syndrome  M79.18                 Treatment: 85028  Manipulation right innominate, sacrum, L5 well-tolerated. Manipulation T4 well-tolerated. Discussion:  Continue daily stretching ice immediately upon returning home 15 minutes lower back he has a good understanding.

## (undated) DEVICE — GLOVE SRG BIOGEL 7

## (undated) DEVICE — STERILE BETHLEHEM PLASTIC HAND: Brand: CARDINAL HEALTH

## (undated) DEVICE — MINI BLADE ROUND TIP SHARP ON ONE SIDE

## (undated) DEVICE — GAUZE SPONGES,16 PLY: Brand: CURITY

## (undated) DEVICE — STRETCH BANDAGE: Brand: CURITY

## (undated) DEVICE — IMPERVIOUS STOCKINETTE: Brand: DEROYAL

## (undated) DEVICE — CHLORAPREP HI-LITE 26ML ORANGE

## (undated) DEVICE — DISPOSABLE EQUIPMENT COVER: Brand: SMALL TOWEL DRAPE

## (undated) DEVICE — CUFF TOURNIQUET 18 X 4 IN QUICK CONNECT DISP 1 BLADDER

## (undated) DEVICE — LIGHT HANDLE COVER SLEEVE DISP BLUE STELLAR

## (undated) DEVICE — GLOVE INDICATOR PI UNDERGLOVE SZ 7 BLUE

## (undated) DEVICE — INTENDED FOR TISSUE SEPARATION, AND OTHER PROCEDURES THAT REQUIRE A SHARP SURGICAL BLADE TO PUNCTURE OR CUT.: Brand: BARD-PARKER ® CARBON RIB-BACK BLADES

## (undated) DEVICE — KNIFE LIGHT 10,PK: Brand: KNIFELIGHT

## (undated) DEVICE — OCCLUSIVE GAUZE STRIP,3% BISMUTH TRIBROMOPHENATE IN PETROLATUM BLEND: Brand: XEROFORM

## (undated) DEVICE — GLOVE INDICATOR PI UNDERGLOVE SZ 6.5 BLUE

## (undated) DEVICE — GLOVE SRG BIOGEL 6.5

## (undated) DEVICE — SPONGE PVP SCRUB WING STERILE

## (undated) DEVICE — ACE WRAP 4 IN STERILE

## (undated) DEVICE — NEEDLE 25G X 1 1/2

## (undated) DEVICE — SUT PROLENE 5-0 P-3 18 IN 8698G